# Patient Record
Sex: FEMALE | Race: ASIAN | NOT HISPANIC OR LATINO | Employment: OTHER | URBAN - METROPOLITAN AREA
[De-identification: names, ages, dates, MRNs, and addresses within clinical notes are randomized per-mention and may not be internally consistent; named-entity substitution may affect disease eponyms.]

---

## 2017-07-24 ENCOUNTER — HOSPITAL ENCOUNTER (OUTPATIENT)
Dept: PHYSICAL THERAPY | Facility: CLINIC | Age: 82
Setting detail: THERAPIES SERIES
End: 2017-07-24
Attending: INTERNAL MEDICINE
Payer: COMMERCIAL

## 2017-07-24 PROCEDURE — T1013 SIGN LANG/ORAL INTERPRETER: HCPCS | Mod: U3

## 2017-07-24 PROCEDURE — 40000719 ZZHC STATISTIC PT DEPARTMENT NEURO VISIT: Performed by: PHYSICAL THERAPIST

## 2017-07-24 PROCEDURE — 97530 THERAPEUTIC ACTIVITIES: CPT | Mod: GP | Performed by: PHYSICAL THERAPIST

## 2017-07-24 PROCEDURE — 97161 PT EVAL LOW COMPLEX 20 MIN: CPT | Mod: GP | Performed by: PHYSICAL THERAPIST

## 2017-08-01 ENCOUNTER — HOSPITAL ENCOUNTER (OUTPATIENT)
Dept: PHYSICAL THERAPY | Facility: CLINIC | Age: 82
Setting detail: THERAPIES SERIES
End: 2017-08-01
Attending: INTERNAL MEDICINE
Payer: COMMERCIAL

## 2017-08-01 PROCEDURE — T1013 SIGN LANG/ORAL INTERPRETER: HCPCS | Mod: U3

## 2017-08-01 PROCEDURE — 97110 THERAPEUTIC EXERCISES: CPT | Mod: GP | Performed by: PHYSICAL THERAPIST

## 2017-08-01 PROCEDURE — 40000719 ZZHC STATISTIC PT DEPARTMENT NEURO VISIT: Performed by: PHYSICAL THERAPIST

## 2017-08-01 PROCEDURE — 97112 NEUROMUSCULAR REEDUCATION: CPT | Mod: GP | Performed by: PHYSICAL THERAPIST

## 2017-08-01 PROCEDURE — 97530 THERAPEUTIC ACTIVITIES: CPT | Mod: GP | Performed by: PHYSICAL THERAPIST

## 2017-08-14 ENCOUNTER — HOSPITAL ENCOUNTER (OUTPATIENT)
Dept: PHYSICAL THERAPY | Facility: CLINIC | Age: 82
Setting detail: THERAPIES SERIES
End: 2017-08-14
Attending: INTERNAL MEDICINE
Payer: COMMERCIAL

## 2017-08-14 PROCEDURE — 40000719 ZZHC STATISTIC PT DEPARTMENT NEURO VISIT: Performed by: PHYSICAL THERAPIST

## 2017-08-14 PROCEDURE — 97110 THERAPEUTIC EXERCISES: CPT | Mod: GP | Performed by: PHYSICAL THERAPIST

## 2017-08-14 PROCEDURE — 97530 THERAPEUTIC ACTIVITIES: CPT | Mod: GP | Performed by: PHYSICAL THERAPIST

## 2017-08-23 ENCOUNTER — HOSPITAL ENCOUNTER (OUTPATIENT)
Dept: PHYSICAL THERAPY | Facility: CLINIC | Age: 82
Setting detail: THERAPIES SERIES
End: 2017-08-23
Attending: INTERNAL MEDICINE
Payer: COMMERCIAL

## 2017-08-23 PROCEDURE — 40000719 ZZHC STATISTIC PT DEPARTMENT NEURO VISIT: Performed by: PHYSICAL THERAPIST

## 2017-08-23 PROCEDURE — 97530 THERAPEUTIC ACTIVITIES: CPT | Mod: GP | Performed by: PHYSICAL THERAPIST

## 2017-08-23 PROCEDURE — 97110 THERAPEUTIC EXERCISES: CPT | Mod: GP | Performed by: PHYSICAL THERAPIST

## 2017-08-30 ENCOUNTER — HOSPITAL ENCOUNTER (OUTPATIENT)
Dept: PHYSICAL THERAPY | Facility: CLINIC | Age: 82
Setting detail: THERAPIES SERIES
End: 2017-08-30
Attending: INTERNAL MEDICINE
Payer: COMMERCIAL

## 2017-08-30 PROCEDURE — 97110 THERAPEUTIC EXERCISES: CPT | Mod: GP | Performed by: PHYSICAL THERAPIST

## 2017-08-30 PROCEDURE — 40000719 ZZHC STATISTIC PT DEPARTMENT NEURO VISIT: Performed by: PHYSICAL THERAPIST

## 2017-08-30 PROCEDURE — 97530 THERAPEUTIC ACTIVITIES: CPT | Mod: GP | Performed by: PHYSICAL THERAPIST

## 2017-09-06 ENCOUNTER — HOSPITAL ENCOUNTER (OUTPATIENT)
Dept: PHYSICAL THERAPY | Facility: CLINIC | Age: 82
Setting detail: THERAPIES SERIES
End: 2017-09-06
Attending: INTERNAL MEDICINE
Payer: COMMERCIAL

## 2017-09-06 PROCEDURE — 40000719 ZZHC STATISTIC PT DEPARTMENT NEURO VISIT: Performed by: PHYSICAL THERAPIST

## 2017-09-06 PROCEDURE — 97530 THERAPEUTIC ACTIVITIES: CPT | Mod: GP | Performed by: PHYSICAL THERAPIST

## 2017-09-06 PROCEDURE — 97110 THERAPEUTIC EXERCISES: CPT | Mod: GP | Performed by: PHYSICAL THERAPIST

## 2017-09-13 ENCOUNTER — HOSPITAL ENCOUNTER (OUTPATIENT)
Dept: PHYSICAL THERAPY | Facility: CLINIC | Age: 82
Setting detail: THERAPIES SERIES
End: 2017-09-13
Attending: INTERNAL MEDICINE
Payer: COMMERCIAL

## 2017-09-13 PROCEDURE — 97110 THERAPEUTIC EXERCISES: CPT | Mod: GP | Performed by: PHYSICAL THERAPIST

## 2017-09-13 PROCEDURE — 40000719 ZZHC STATISTIC PT DEPARTMENT NEURO VISIT: Performed by: PHYSICAL THERAPIST

## 2017-09-13 PROCEDURE — 97530 THERAPEUTIC ACTIVITIES: CPT | Mod: GP | Performed by: PHYSICAL THERAPIST

## 2017-09-21 ENCOUNTER — HOSPITAL ENCOUNTER (OUTPATIENT)
Dept: PHYSICAL THERAPY | Facility: CLINIC | Age: 82
Setting detail: THERAPIES SERIES
End: 2017-09-21
Attending: INTERNAL MEDICINE
Payer: COMMERCIAL

## 2017-09-21 PROCEDURE — 97530 THERAPEUTIC ACTIVITIES: CPT | Mod: GP | Performed by: PHYSICAL THERAPIST

## 2017-09-21 PROCEDURE — 40000719 ZZHC STATISTIC PT DEPARTMENT NEURO VISIT: Performed by: PHYSICAL THERAPIST

## 2017-09-21 PROCEDURE — 97110 THERAPEUTIC EXERCISES: CPT | Mod: GP | Performed by: PHYSICAL THERAPIST

## 2017-09-28 ENCOUNTER — HOSPITAL ENCOUNTER (OUTPATIENT)
Dept: PHYSICAL THERAPY | Facility: CLINIC | Age: 82
Setting detail: THERAPIES SERIES
End: 2017-09-28
Attending: INTERNAL MEDICINE
Payer: COMMERCIAL

## 2017-09-28 PROCEDURE — 97110 THERAPEUTIC EXERCISES: CPT | Mod: GP | Performed by: PHYSICAL THERAPIST

## 2017-09-28 PROCEDURE — 40000719 ZZHC STATISTIC PT DEPARTMENT NEURO VISIT: Performed by: PHYSICAL THERAPIST

## 2017-09-28 PROCEDURE — 97530 THERAPEUTIC ACTIVITIES: CPT | Mod: GP | Performed by: PHYSICAL THERAPIST

## 2017-10-13 ENCOUNTER — HOSPITAL ENCOUNTER (OUTPATIENT)
Dept: PHYSICAL THERAPY | Facility: CLINIC | Age: 82
Setting detail: THERAPIES SERIES
End: 2017-10-13
Attending: INTERNAL MEDICINE
Payer: COMMERCIAL

## 2017-10-13 PROCEDURE — 97110 THERAPEUTIC EXERCISES: CPT | Mod: GP | Performed by: PHYSICAL THERAPIST

## 2017-10-13 PROCEDURE — 40000719 ZZHC STATISTIC PT DEPARTMENT NEURO VISIT: Performed by: PHYSICAL THERAPIST

## 2017-10-13 PROCEDURE — 97530 THERAPEUTIC ACTIVITIES: CPT | Mod: GP | Performed by: PHYSICAL THERAPIST

## 2017-10-13 NOTE — IP AVS SNAPSHOT
"                  MRN:[id number]                      After Visit Summary   10/13/2017    Julio Grier    MRN: [id number]           Visit Information        Provider Department      10/13/2017  9:00 AM Shannon Toledo, PT; [employer] [facility], Physical Therapy - Outpatient        Your next 10 appointments already scheduled     Oct 18, 2017 10:00 AM CDT   Neuro Treatment with Fidelia Foster, PT   [facility], Physical Therapy - Outpatient ([facility])    [address], [address]  [place] [address]   [phone]            Oct 25, 2017  9:00 AM CDT   Neuro Treatment with Shannon Toledo, PT   [facility], Physical Therapy - Outpatient ([facility])    [address], [address]  [place] [address]   [phone]            Nov 03, 2017 10:00 AM CDT   Neuro Treatment with Shannon Toledo, PT   [facility], Physical Therapy - Outpatient ([facility])    [address], [address]  [place] [address]   [phone]                Further instructions from your care team       10/13/17    Chloe:    Congratulations on your wedding!    I think you mom needs to continue to do the seated leg exercises daily AND the standing at the walker or kitchen counter DAILY.  It really helps her to stay strong in the legs and takes weight off the buttocks.      She has 3 more PT allie'ts.     Thanks much  Shannon  PT    MyChart Information     [employer] lets you send messages to your doctor, view your test results, renew your prescriptions, schedule appointments and more. To sign up, go to [email or web address] . Click on \"Log in\" on the left side of the screen, which will take you to the Welcome page. Then click on \"Sign up Now\" on the right side of the page.     You will be asked to enter the access code listed below, as well as some personal information. Please " follow the directions to create your username and password.     Your access code is: [email or web address]  Expires: 2018  9:32 AM     Your access code will  in 90 days. If you need help or a new code, please call your [place] clinic or [phone].        Care EveryWhere ID     This is your Care EveryWhere ID. This could be used by other organizations to access your [place] medical records  [id number]        Equal Access to Services     BENJAMIN RUIZ : Hadii aad ku hadasho Somatali, waaxda luqadaha, qaybta kaalmada adeegjagdish, elisabet pelayo . So [facility] [phone].    ATENCIÓN: Si habla español, tiene a rahman disposición servicios gratuitos de asistencia lingüística. Llame al [phone].    We comply with applicable federal civil rights laws and [place] laws. We do not discriminate on the basis of race, color, national origin, age, disability, sex, sexual orientation, or gender identity.

## 2017-10-13 NOTE — DISCHARGE INSTRUCTIONS
10/13/17    Chloe:    Congratulations on your wedding!    I think you mom needs to continue to do the seated leg exercises daily AND the standing at the walker or kitchen counter DAILY.  It really helps her to stay strong in the legs and takes weight off the buttocks.      She has 3 more PT allie'ts.     Thanks much  Shannon  PT

## 2017-10-18 ENCOUNTER — HOSPITAL ENCOUNTER (OUTPATIENT)
Dept: PHYSICAL THERAPY | Facility: CLINIC | Age: 82
Setting detail: THERAPIES SERIES
End: 2017-10-18
Attending: INTERNAL MEDICINE
Payer: COMMERCIAL

## 2017-10-25 ENCOUNTER — HOSPITAL ENCOUNTER (OUTPATIENT)
Dept: PHYSICAL THERAPY | Facility: CLINIC | Age: 82
Setting detail: THERAPIES SERIES
End: 2017-10-25
Attending: INTERNAL MEDICINE
Payer: COMMERCIAL

## 2017-10-25 PROCEDURE — 97530 THERAPEUTIC ACTIVITIES: CPT | Mod: GP | Performed by: PHYSICAL THERAPIST

## 2017-10-25 PROCEDURE — 40000719 ZZHC STATISTIC PT DEPARTMENT NEURO VISIT: Performed by: PHYSICAL THERAPIST

## 2017-10-25 PROCEDURE — T1013 SIGN LANG/ORAL INTERPRETER: HCPCS | Mod: U3

## 2017-10-25 NOTE — DISCHARGE INSTRUCTIONS
10/25/17    Try to do DAILY standing at the walker.  It really helps the leg strength.      Give Young a plastic glass.  Then she can use both hands to use the glass and her left hand will get some exercise.      thanks  Shannon  PT  [phone]

## 2017-10-25 NOTE — IP AVS SNAPSHOT
"                  MRN:[id number]                      After Visit Summary   10/25/2017    Julio Grier    MRN: [id number]           Visit Information        Provider Department      10/25/2017  9:00 AM Oh Brijesh Cai Monica, PT [facility], [place], Physical Therapy - Outpatient        Your next 10 appointments already scheduled     2017 10:00 AM CDT   Neuro Treatment with Shannon Toledo, PT   [facility], [place], Physical Therapy - Outpatient ([facility])    [address], [address]  [place] [address]   [phone]                Further instructions from your care team       10/25/17    Try to do DAILY standing at the walker.  It really helps the leg strength.      Give Julio a plastic glass.  Then she can use both hands to use the glass and her left hand will get some exercise.      thanks  Shannon  PT  [phone]    MyChart Information     [employer] lets you send messages to your doctor, view your test results, renew your prescriptions, schedule appointments and more. To sign up, go to [email or web address] . Click on \"Log in\" on the left side of the screen, which will take you to the Welcome page. Then click on \"Sign up Now\" on the right side of the page.     You will be asked to enter the access code listed below, as well as some personal information. Please follow the directions to create your username and password.     Your access code is: [email or web address]  Expires: 2018  9:32 AM     Your access code will  in 90 days. If you need help or a new code, please call your [place] clinic or [phone].        Care EveryWhere ID     This is your Care EveryWhere ID. This could be used by other organizations to access your [place] medical records  [id number]        Equal Access to Services     SABINO RUIZ : Garry Clemons, boo felton, elisabet denton. So [employer] " [phone].    ATENCIÓN: Si habla español, tiene a rahman disposición servicios gratuitos de asistencia lingüística. Llame al [phone].    We comply with applicable federal civil rights laws and [place] laws. We do not discriminate on the basis of race, color, national origin, age, disability, sex, sexual orientation, or gender identity.

## 2017-11-03 ENCOUNTER — HOSPITAL ENCOUNTER (OUTPATIENT)
Dept: PHYSICAL THERAPY | Facility: CLINIC | Age: 82
Setting detail: THERAPIES SERIES
End: 2017-11-03
Attending: INTERNAL MEDICINE
Payer: COMMERCIAL

## 2017-11-03 PROCEDURE — 40000719 ZZHC STATISTIC PT DEPARTMENT NEURO VISIT: Performed by: PHYSICAL THERAPIST

## 2017-11-03 PROCEDURE — 97530 THERAPEUTIC ACTIVITIES: CPT | Mod: GP | Performed by: PHYSICAL THERAPIST

## 2017-11-06 NOTE — ADDENDUM NOTE
Encounter addended by: Shannon Toledo, PT on: 11/6/2017  8:26 AM<BR>     Actions taken: Flowsheet accepted

## 2017-11-29 ENCOUNTER — HOSPITAL ENCOUNTER (OUTPATIENT)
Dept: PHYSICAL THERAPY | Facility: CLINIC | Age: 82
Setting detail: THERAPIES SERIES
End: 2017-11-29
Attending: INTERNAL MEDICINE
Payer: COMMERCIAL

## 2017-11-29 PROCEDURE — 40000719 ZZHC STATISTIC PT DEPARTMENT NEURO VISIT: Performed by: PHYSICAL THERAPIST

## 2017-11-29 PROCEDURE — 97530 THERAPEUTIC ACTIVITIES: CPT | Mod: GP | Performed by: PHYSICAL THERAPIST

## 2017-11-29 PROCEDURE — 97110 THERAPEUTIC EXERCISES: CPT | Mod: GP | Performed by: PHYSICAL THERAPIST

## 2017-12-18 ENCOUNTER — HOSPITAL ENCOUNTER (OUTPATIENT)
Dept: PHYSICAL THERAPY | Facility: CLINIC | Age: 82
Setting detail: THERAPIES SERIES
End: 2017-12-18
Attending: INTERNAL MEDICINE
Payer: COMMERCIAL

## 2017-12-18 PROCEDURE — 97110 THERAPEUTIC EXERCISES: CPT | Mod: GP | Performed by: PHYSICAL THERAPIST

## 2017-12-18 PROCEDURE — 40000719 ZZHC STATISTIC PT DEPARTMENT NEURO VISIT: Performed by: PHYSICAL THERAPIST

## 2017-12-18 PROCEDURE — 97530 THERAPEUTIC ACTIVITIES: CPT | Mod: GP | Performed by: PHYSICAL THERAPIST

## 2017-12-18 PROCEDURE — T1013 SIGN LANG/ORAL INTERPRETER: HCPCS | Mod: U3

## 2017-12-19 NOTE — PROGRESS NOTES
12/18/17 0900   Signing Clinician's Name / Credentials   Signing clinician's name / credentials Shannon Toledo PT   Session Number   Session Number 15   Goal 1   Goal Identifier HEP   Goal Description 1. Pt able to demo HEP consisting of seated/lying trunk/LE strengthening/postural control/balance ex with supervision only to maximize her functional mobility and safety.   Target Date 12/29/17   Date Met 12/19/17   Goal 2   Goal Identifier transfers   Goal Description 2. Pt to demonstrate consistently safe stand pivot transfers w/c to bed with less than moderate assist of 1 to decrease burden on caregiver and increase her participation in ADL's.   Target Date 12/29/17   Date Met 12/19/17   Goal 3   Goal Identifier standing program established/ able, met w/ son's assist w/ use of 2wh walker   Goal Description 3. Pt to demonstrate ability to perform active standing with hand support/1 assist or in standing frame to receive benefits of weight bearing and more prolonged upright posture.   Target Date 10/22/17   Date Met 11/29/17       Present Yes   Language Hmong   Subjective Report   Subjective Report Julio is here for her last session.   here in manual w/c for mobility.  son was sick but i do my home exer.   Therapeutic Procedure/exercise   Minutes 12   Skilled Intervention reviewed her HEP   Patient Response marichuy well   Treatment Detail reviewed her seated in w/c laq's AP,s and march in place.  marichuy well   Progress above. rec she cont w/ this at home daily.    Therapeutic Activity   Minutes 32   Skilled Intervention transfers, standing   Patient Response marichuy well   Treatment Detail facing 2ww paced against mat table - pt able to stand 5x at 25 -45 sec each time, min assist and cues.  rec she cont this at home.  transfers w/ min A.  video taped this for pt to show son - that he should assist her less and let her do more.  she agrees.    Progress marichuy well.    Education   Learner Patient   Readiness  Acceptance   Method Explanation   Response Verbalizes Understanding   Education Comments cont HEP daily   Plan   Homework above   Home program seated exer as above. standing facing the walker w/ son   Updates to plan of care goals met   Plan for next session d/c   Total Session Time   Timed Code Treatment Minutes 44   Total Treatment Time (sum of timed and untimed services) 44

## 2017-12-19 NOTE — PROGRESS NOTES
Outpatient Physical Therapy Discharge Note     Patient: Julio RIOS Oh  : 1934    Beginning/End Dates of Reporting Period:  17 to 2017    Referring Provider: Julio Romero MD    Therapy Diagnosis: weakness     Client Self Report: Julio is here for her last session.   here in manual w/c for mobility.  son was sick but i do my home exer.    Goals:  Goal Identifier HEP   Goal Description 1. Pt able to demo HEP consisting of seated/lying trunk/LE strengthening/postural control/balance ex with supervision only to maximize her functional mobility and safety.   Target Date 17   Date Met  17   Progress:     Goal Identifier transfers   Goal Description 2. Pt to demonstrate consistently safe stand pivot transfers w/c to bed with less than moderate assist of 1 to decrease burden on caregiver and increase her participation in ADL's.   Target Date 17   Date Met  17   Progress:     Goal Identifier standing program established/ able, met w/ son's assist w/ use of 2wh walker   Goal Description 3. Pt to demonstrate ability to perform active standing with hand support/1 assist or in standing frame to receive benefits of weight bearing and more prolonged upright posture.   Target Date 10/22/17   Date Met  17   Progress:         Progress Toward Goals:   Progress this reporting period: good progress      Plan:  Discharge from therapy.    Discharge:  yes    Reason for Discharge: Patient has met all goals.    Equipment Issued: none    Discharge Plan: Patient to continue home program.

## 2017-12-22 NOTE — ADDENDUM NOTE
Encounter addended by: Torie Sawant on: 12/22/2017 12:56 PM<BR>     Actions taken: Visit Navigator Flowsheet section accepted, Charge Capture section accepted

## 2018-08-08 ENCOUNTER — HOSPITAL ENCOUNTER (OUTPATIENT)
Dept: PHYSICAL THERAPY | Facility: CLINIC | Age: 83
Setting detail: THERAPIES SERIES
End: 2018-08-08
Attending: INTERNAL MEDICINE
Payer: COMMERCIAL

## 2018-08-08 PROCEDURE — T1013 SIGN LANG/ORAL INTERPRETER: HCPCS | Mod: U3

## 2018-08-08 PROCEDURE — 97161 PT EVAL LOW COMPLEX 20 MIN: CPT | Mod: GP | Performed by: PHYSICAL THERAPIST

## 2018-08-08 PROCEDURE — 97110 THERAPEUTIC EXERCISES: CPT | Mod: GP | Performed by: PHYSICAL THERAPIST

## 2018-08-08 PROCEDURE — 40000719 ZZHC STATISTIC PT DEPARTMENT NEURO VISIT: Performed by: PHYSICAL THERAPIST

## 2018-08-08 PROCEDURE — 97530 THERAPEUTIC ACTIVITIES: CPT | Mod: GP | Performed by: PHYSICAL THERAPIST

## 2018-08-08 NOTE — DISCHARGE INSTRUCTIONS
8/8/18    Chloe:    Please stand with Young daily - facing walker/ brace the walker against something for stability.  Today she did 50 seconds, rest, 38 seconds, rest, 35 seconds.      It would be helpful if you came to one of the sessions.       thanks  Shannon  PT  [phone]

## 2018-08-08 NOTE — IP AVS SNAPSHOT
MRN:[id number]                      After Visit Summary   8/8/2018    Julio Grier    MRN: [id number]           Visit Information        Provider Department      8/8/2018  9:45 AM Oh Torie Cai; Shannon Toledo, PT [facility], [place], Physical Therapy - Outpatient          Further instructions from your care team       8/8/18    Chloe:    Please stand with Julio daily - facing walker/ brace the walker against something for stability.  Today she did 50 seconds, rest, 38 seconds, rest, 35 seconds.      It would be helpful if you came to one of the sessions.       thanks  Shannon  PT  [phone]    Care EveryWhere ID     This is your Care EveryWhere ID. This could be used by other organizations to access your [place] medical records  [id number]        Equal Access to Services     SABINO RUIZ : Garry Clemons, wateddyda luqadaha, qaybta kaalmada ademaximo, elisabet diaz. So [facility] [phone].    ATENCIÓN: Si habla español, tiene a rahman disposición servicios gratuitos de asistencia lingüística. Llame al [phone].    We comply with applicable federal civil rights laws and [place] laws. We do not discriminate on the basis of race, color, national origin, age, disability, sex, sexual orientation, or gender identity.

## 2018-08-09 NOTE — PROGRESS NOTES
08/08/18 0900   General Information   Start of Care Date 08/08/18   Referring Physician Julio Romero.     Orders Evaluate and Treat as Indicated   Order Date 07/12/18   Medical Diagnosis CVA w/ L hemiparesis.  2011   Special Instructions had cva 2011   Surgical/Medical history reviewed Yes   Pertinent Visual History  using sunglasses for outside.     Prior level of functional mobility Transfers   Transfers son helps her pt pt - stand pivot   ADL assisted by son for dressing/showering/ADL's   Improvement after PT Mild   Current Community Support Personal care attendant;Transportation service   Patient role/Employment history Disabled;Retired   Living environment Apartment/condo   Home/Community Accessibility Comments accessible   Current Assistive Devices Manual Wheel Chair;Power Wheel Chair   Patient/Family Goals Statement better standing and try to walk.     General Information Comments pt here alone/ w/ , son lives w/ her and is pca.  gets medical ride.   Fall Risk Screen   Fall screen completed by PT   Have you fallen 2 or more times in the past year? No   Have you fallen and had an injury in the past year? No   Vitals Signs   Heart Rate 60   SpO2 97   Blood Pressure 149/61   Vital Signs Comments did not take meds yet today .  10am.   Cognitive Status Examination   Orientation orientation to person, place and time  (knows August. not day)   Level of Consciousness alert   Follows Commands and Answers Questions 100% of the time   Personal Safety and Judgment intact   Memory impaired   Integumentary   Integumentary No deficits were identified   Posture   Posture Forward head position   Range of Motion (ROM)   ROM Comment wfls   Strength   Strength Comments R side gr 4, L arm 2-, L hip flexion 3-, knee ext 3- DF 3.    Bed Mobility   Bed Mobility Comments min assist   Transfer Skills   Transfer Comments min assist to R, mod assist to L   Locomotion   Wheel Chair Mobility Comments needs assist for manual w/c.    Gait   Gait Comments non amb at this time   Balance   Balance Comments sitting, can sit edge of mat w. sba.  standing requires min assist and 2ww   Sensory Examination   Sensory Perception Comments not assessed   Coordination   Coordination Comments poor L hand due to weakness   Muscle Tone   Muscle Tone other (describe)   Muscle Tone Comments low tone due to weakness   Planned Therapy Interventions   Planned Therapy Interventions bed mobility training;balance training;neuromuscular re-education;strengthening;transfer training   Clinical Impression   Criteria for Skilled Therapeutic Interventions Met yes, treatment indicated   PT Diagnosis L sided weakness and req assist w/ tx   Influenced by the following impairments weakness, deconditioned   Functional limitations due to impairments needs assist w/ bed mob, transfers, potential for skin breakdown   Clinical Presentation Stable/Uncomplicated   Clinical Decision Making (Complexity) Low complexity   Therapy Frequency other (see comments)   Predicted Duration of Therapy Intervention (days/wks) up to 6x in 90 days   Risk & Benefits of therapy have been explained Yes   Patient, Family & other staff in agreement with plan of care Yes   Clinical Impression Comments pt familiar to this clinic/ no major change in status.  has potential to improve if son/ pca did HEP regularly.  pt states son is tired from caregiving   Education Assessment   Preferred Learning Style Demonstration   Barriers to Learning Physical;Language   Goal 1   Goal Identifier transfers   Goal Description pt to perform w/c to bed/mat transfers w/ sba to min assist (vs mod)   Target Date 11/05/18   Goal 2   Goal Identifier bed mobility   Goal Description pt to perf bed mobility w/ sba only   Target Date 11/05/18   Goal 3   Goal Identifier HEP    Goal Description pt to perf indep seated and supine HEP for overall conditioning and wellness.   Target Date 11/05/18   Total Evaluation Time   Total Evaluation  Time (Minutes) 23

## 2018-08-29 ENCOUNTER — HOSPITAL ENCOUNTER (OUTPATIENT)
Dept: PHYSICAL THERAPY | Facility: CLINIC | Age: 83
Setting detail: THERAPIES SERIES
End: 2018-08-29
Attending: INTERNAL MEDICINE
Payer: COMMERCIAL

## 2018-08-29 PROCEDURE — 97530 THERAPEUTIC ACTIVITIES: CPT | Mod: GP | Performed by: PHYSICAL THERAPIST

## 2018-08-29 PROCEDURE — 97110 THERAPEUTIC EXERCISES: CPT | Mod: GP | Performed by: PHYSICAL THERAPIST

## 2018-08-29 PROCEDURE — 40000719 ZZHC STATISTIC PT DEPARTMENT NEURO VISIT: Performed by: PHYSICAL THERAPIST

## 2018-08-29 PROCEDURE — T1013 SIGN LANG/ORAL INTERPRETER: HCPCS | Mod: U3

## 2018-09-07 ENCOUNTER — HOSPITAL ENCOUNTER (OUTPATIENT)
Dept: PHYSICAL THERAPY | Facility: CLINIC | Age: 83
Setting detail: THERAPIES SERIES
End: 2018-09-07
Attending: INTERNAL MEDICINE
Payer: COMMERCIAL

## 2018-09-07 PROCEDURE — T1013 SIGN LANG/ORAL INTERPRETER: HCPCS | Mod: U3

## 2018-09-07 PROCEDURE — 97110 THERAPEUTIC EXERCISES: CPT | Mod: GP | Performed by: PHYSICAL THERAPIST

## 2018-09-07 PROCEDURE — 40000719 ZZHC STATISTIC PT DEPARTMENT NEURO VISIT: Performed by: PHYSICAL THERAPIST

## 2018-09-07 PROCEDURE — 97530 THERAPEUTIC ACTIVITIES: CPT | Mod: GP | Performed by: PHYSICAL THERAPIST

## 2018-09-14 ENCOUNTER — HOSPITAL ENCOUNTER (OUTPATIENT)
Dept: PHYSICAL THERAPY | Facility: CLINIC | Age: 83
Setting detail: THERAPIES SERIES
End: 2018-09-14
Attending: INTERNAL MEDICINE
Payer: COMMERCIAL

## 2018-09-14 PROCEDURE — T1013 SIGN LANG/ORAL INTERPRETER: HCPCS | Mod: U3

## 2018-09-14 PROCEDURE — 40000719 ZZHC STATISTIC PT DEPARTMENT NEURO VISIT: Performed by: PHYSICAL THERAPIST

## 2018-09-14 PROCEDURE — 97530 THERAPEUTIC ACTIVITIES: CPT | Mod: GP | Performed by: PHYSICAL THERAPIST

## 2018-09-14 NOTE — IP AVS SNAPSHOT
MRN:[id number]                      After Visit Summary   9/14/2018    Julio Grier    MRN: [id number]           Visit Information        Provider Department      9/14/2018  9:45 AM Torie Sawant; Shannon Toledo, PT [facility], [place], Physical Therapy - Outpatient        Your next 10 appointments already scheduled     Oct 05, 2018  1:00 PM CDT   Neuro Treatment with Shannon Toledo, PT   [facility], [place], Physical Therapy - Outpatient ([facility])    [address]  [place] [address]   [phone]            Oct 12, 2018  1:00 PM CDT   Neuro Treatment with Shannon Toledo PT   [facility], [place], Physical Therapy - Outpatient ([facility])    [address]  [place] [address]   [phone]                Further instructions from your care team       9/14/18    See pic adrian Huff.  Try to find a place in the apartment where you can stand at a rail OR best is kitchen sink.  I barely did anything to help.  30-60 seconds each time.  2-5 times.    thanks  Two more sessions.  Shannon  PT    Care EveryWhere ID     This is your Care EveryWhere ID. This could be used by other organizations to access your [place] medical records  [id number]        Equal Access to Services     SABINO RUIZ AH: Garry dicko Soalysia, waaxda luqadaha, qaybta kaalmada adeegyada, elisabet diaz. So [facility] [phone].    ATENCIÓN: Si habla español, tiene a rahman disposición servicios gratuitos de asistencia lingüística. Llame al [phone].    We comply with applicable federal civil rights laws and [place] laws. We do not discriminate on the basis of race, color, national origin, age, disability, sex, sexual orientation, or gender identity.

## 2018-09-14 NOTE — DISCHARGE INSTRUCTIONS
9/14/18    See pic adrian Huff.  Try to find a place in the apartment where you can stand at a rail OR best is kitchen sink.  I barely did anything to help.  30-60 seconds each time.  2-5 times.    thanks  Two more sessions.  Shannon BEGUM

## 2018-09-21 NOTE — ADDENDUM NOTE
Encounter addended by: Torie Sawant on: 9/21/2018 11:28 AM<BR>     Actions taken: Visit Navigator Flowsheet section accepted, Charge Capture section accepted

## 2018-10-05 ENCOUNTER — HOSPITAL ENCOUNTER (OUTPATIENT)
Dept: PHYSICAL THERAPY | Facility: CLINIC | Age: 83
Setting detail: THERAPIES SERIES
End: 2018-10-05
Attending: INTERNAL MEDICINE
Payer: COMMERCIAL

## 2018-10-05 PROCEDURE — 97530 THERAPEUTIC ACTIVITIES: CPT | Mod: GP | Performed by: PHYSICAL THERAPIST

## 2018-10-05 PROCEDURE — T1013 SIGN LANG/ORAL INTERPRETER: HCPCS | Mod: U3

## 2018-10-05 PROCEDURE — 40000719 ZZHC STATISTIC PT DEPARTMENT NEURO VISIT: Performed by: PHYSICAL THERAPIST

## 2018-10-05 NOTE — IP AVS SNAPSHOT
MRN:[id number]                      After Visit Summary   10/5/2018    Julio Grier    MRN: [id number]           Visit Information        Provider Department      10/5/2018  1:00 PM Oh Torie Cai; Shannon Toledo, PT [facility], [place], Physical Therapy - Outpatient        Your next 10 appointments already scheduled     Oct 12, 2018  1:00 PM CDT   Neuro Treatment with Shannon Toledo PT   [facility], [place], Physical Therapy - Outpatient ([facility])    [address]  [place] [address]   [phone]                Further instructions from your care team       JULIO and Son.    10/5/18    JULIO would benefit from a horizontal or vertical grab bar placed so she can stand independently.      You would need to buy one and have it installed and have permission from the landlord.  She did very well with it.    Continue all home exercises.      Shannon  PT      Care EveryWhere ID     This is your Care EveryWhere ID. This could be used by other organizations to access your [place] medical records  [id number]        Equal Access to Services     SABINO RUIZ AH: Hadii mikki dicko Soalysia, waaxda luqadaha, qaybta kaalmada adeegyacarlos, elisabet diaz. So [facility] [phone].    ATENCIÓN: Si habla español, tiene a rahman disposición servicios gratuitos de asistencia lingüística. Llame al [phone].    We comply with applicable federal civil rights laws and [place] laws. We do not discriminate on the basis of race, color, national origin, age, disability, sex, sexual orientation, or gender identity.

## 2018-10-09 NOTE — ADDENDUM NOTE
Encounter addended by: Torie Sawant on: 10/9/2018 12:21 PM<BR>     Actions taken: Visit Navigator Flowsheet section accepted, Charge Capture section accepted

## 2018-10-12 ENCOUNTER — HOSPITAL ENCOUNTER (OUTPATIENT)
Dept: PHYSICAL THERAPY | Facility: CLINIC | Age: 83
Setting detail: THERAPIES SERIES
End: 2018-10-12
Attending: INTERNAL MEDICINE
Payer: COMMERCIAL

## 2018-10-12 PROCEDURE — 97530 THERAPEUTIC ACTIVITIES: CPT | Mod: GP | Performed by: PHYSICAL THERAPIST

## 2018-10-12 PROCEDURE — 40000719 ZZHC STATISTIC PT DEPARTMENT NEURO VISIT: Performed by: PHYSICAL THERAPIST

## 2018-10-12 NOTE — DISCHARGE INSTRUCTIONS
10/12/18    Try to not help too much - let Young do as much as she can with the transfer and lying down and getting up to sitting position.     Two ways to stand:   1.  See photo from [facility].  Stand from bed.  Son in front.  30 seconds to one minute.   2.  Facing walker.    Do this 5 times per week. 1 or 2 days off.    Red band tied to chair - for arm exercise or to put on left foot and kick leg straight.     Shannon  PT  [phone]

## 2018-10-12 NOTE — IP AVS SNAPSHOT
MRN:[id number]                      After Visit Summary   10/12/2018    Julio Grier    MRN: [id number]           Visit Information        Provider Department      10/12/2018  1:00 PM Oh Torie Cai; Shannon Toledo, PT [facility], [place], Physical Therapy - Outpatient        Your next 10 appointments already scheduled     Oct 31, 2018  9:00 AM CDT   Neuro Treatment with Shannon Toledo, PT   [facility], [place], Physical Therapy - Outpatient ([facility])    [address]  [place] [address]   [phone]            Nov 09, 2018 10:45 AM CST   Neuro Treatment with Shannon Toledo PT   [facility], [place], Physical Therapy - Outpatient ([facility])    [address]  [place] [address]   [phone]                Further instructions from your care team       10/12/18    Try to not help too much - let Julio do as much as she can with the transfer and lying down and getting up to sitting position.     Two ways to stand:   1.  See photo from [facility].  Stand from bed.  Son in front.  30 seconds to one minute.   2.  Facing walker.    Do this 5 times per week. 1 or 2 days off.    Red band tied to chair - for arm exercise or to put on left foot and kick leg straight.     Shannon BEGUM  [phone]    Care EveryWhere ID     This is your Care EveryWhere ID. This could be used by other organizations to access your [place] medical records  [id number]        Equal Access to Services     SABINO RUIZ AH: Hadliane dicko Soalysia, waaxda luqadaha, qaybta kaalmada adeegyacarlos, elisabet diaz. So [facility] [phone].    ATENCIÓN: Si habla español, tiene a rahman disposición servicios gratuitos de asistencia lingüística. Llame al [phone].    We comply with applicable federal civil rights laws and [place] laws. We do not discriminate on the basis of race, color, national  origin, age, disability, sex, sexual orientation, or gender identity.

## 2018-10-31 ENCOUNTER — HOSPITAL ENCOUNTER (OUTPATIENT)
Dept: PHYSICAL THERAPY | Facility: CLINIC | Age: 83
Setting detail: THERAPIES SERIES
End: 2018-10-31
Attending: INTERNAL MEDICINE
Payer: COMMERCIAL

## 2018-10-31 PROCEDURE — 40000719 ZZHC STATISTIC PT DEPARTMENT NEURO VISIT: Performed by: PHYSICAL THERAPIST

## 2018-10-31 PROCEDURE — 97530 THERAPEUTIC ACTIVITIES: CPT | Mod: GP | Performed by: PHYSICAL THERAPIST

## 2018-10-31 PROCEDURE — T1013 SIGN LANG/ORAL INTERPRETER: HCPCS | Mod: U3

## 2018-10-31 NOTE — IP AVS SNAPSHOT
MRN:[id number]                      After Visit Summary   10/31/2018    Julio Grier    MRN: [id number]           Visit Information        Provider Department      10/31/2018  9:00 AM Oh Torie Cai; Shannon Toledo, PT [facility], [place], Physical Therapy - Outpatient        Your next 10 appointments already scheduled     Nov 09, 2018 10:45 AM CST   Neuro Treatment with Shannon Toledo, PT   [facility], [place], Physical Therapy - Outpatient ([facility])    [address], [address]  [place] [address]   [phone]                Further instructions from your care team       10/31/18    Today in PT - we worked on transfer from wheelchair to mat/bed using a walker -  Recommend you do this at home with her.       Place wheelchair at 90 degree angle to bed.  Use walker to stand and pivot to her right to get to bed.    Any questions - call me.  Thanks  Please keep STANDING with her - with walker facing something solid.    Shannon  PT  [phone]    One more allie't left.     Care EveryWhere ID     This is your Care EveryWhere ID. This could be used by other organizations to access your [place] medical records  [id number]        Equal Access to Services     SABINO RUIZ AH: Hadii mikki Clemons, boo felton, qafarhatta kaalmada yesenia, elisabet diaz. So [facility] [phone].    ATENCIÓN: Si habla español, tiene a rahman disposición servicios gratkellyos de asistencia lingüística. Llame al [phone].    We comply with applicable federal civil rights laws and [place] laws. We do not discriminate on the basis of race, color, national origin, age, disability, sex, sexual orientation, or gender identity.

## 2018-11-06 NOTE — ADDENDUM NOTE
Encounter addended by: Shannon Toledo, PT on: 11/6/2018  9:40 AM<BR>     Actions taken: Flowsheet data copied forward, Flowsheet accepted

## 2019-06-18 ENCOUNTER — HOSPITAL ENCOUNTER (OUTPATIENT)
Dept: PHYSICAL THERAPY | Facility: CLINIC | Age: 84
Setting detail: THERAPIES SERIES
End: 2019-06-18
Attending: INTERNAL MEDICINE
Payer: COMMERCIAL

## 2019-06-18 PROCEDURE — T1013 SIGN LANG/ORAL INTERPRETER: HCPCS | Mod: U3

## 2019-06-18 PROCEDURE — 97110 THERAPEUTIC EXERCISES: CPT | Mod: GP | Performed by: PHYSICAL THERAPIST

## 2019-06-18 PROCEDURE — 97161 PT EVAL LOW COMPLEX 20 MIN: CPT | Mod: GP | Performed by: PHYSICAL THERAPIST

## 2019-06-21 NOTE — ADDENDUM NOTE
Encounter addended by: Torie Sawant on: 6/21/2019 1:15 PM   Actions taken: Visit Navigator Flowsheet section accepted, Charge Capture section accepted

## 2019-06-21 NOTE — PROGRESS NOTES
06/18/19 1000   General Information   Start of Care Date 06/18/19   Referring Physician Julio Romero MD   Orders Evaluate and Treat as Indicated   Order Date 04/29/19   Medical Diagnosis CVA   Special Instructions cva 2011   Surgical/Medical history reviewed Yes   Pertinent history of current problem (include personal factors and/or comorbidities that impact the POC) son helps me.  we do exer.  i do them 2 days a week.  my son is busy.    wife still there.   no changes.  still on meds for bp.   both i and doc said to come to PT.     Pertinent Visual History  ok, no glasses   Transfers min assist   Prior level of function comment manual w/c   Improvement after PT Mild   Current Community Support Family/friend caregiver   Patient role/Employment history Disabled;Retired   Living environment Apartment/condo   Home/Community Accessibility Comments accessible   Current Assistive Devices Manual Wheel Chair;Power Wheel Chair   Patient/Family Goals Statement want to walk.  stand w/ walker.     General Information Comments pt has been seen for past episodes   Fall Risk Screen   Fall screen completed by PT   Have you fallen 2 or more times in the past year? No   Have you fallen and had an injury in the past year? No   Pain   Patient currently in pain No   Vitals Signs   Heart Rate 61   Blood Pressure 140/73   Cognitive Status Examination   Orientation person;place   Level of Consciousness alert   Follows Commands and Answers Questions 100% of the time   Personal Safety and Judgment intact   Cognitive Comment oriented to month only.  knows birthdate.     Integumentary   Integumentary No deficits were identified   Posture   Posture Forward head position   Range of Motion (ROM)   ROM Comment L ankle to neutral.  limited L wrist   Strength   Strength Comments gr 4 to 4+ right side, gr 2 grossly L side   Bed Mobility   Bed Mobility Comments min assist   Transfer Skills   Transfer Comments cga to min assist to R, mod assist to L    Locomotion   Wheel Chair Mobility Comments needs assist for manual w/c.   Gait   Gait Comments not assessed   Balance   Balance Comments sitting today on mat, no lob; later leaned L   Sensory Examination   Sensory Perception no deficits were identified   Coordination   Coordination Comments L side poor   Muscle Tone   Muscle Tone other (describe)   Muscle Tone Comments low tone due to weakness   Planned Therapy Interventions   Planned Therapy Interventions bed mobility training;balance training;neuromuscular re-education;strengthening;transfer training   Clinical Impression   Criteria for Skilled Therapeutic Interventions Met yes, treatment indicated   PT Diagnosis L sided weakness   Influenced by the following impairments weakness, fatigue deconditioning   Functional limitations due to impairments needs assist for all mobility/ son assists, w/ caregiver burden   Clinical Presentation Stable/Uncomplicated   Clinical Decision Making (Complexity) Low complexity   Therapy Frequency other (see comments)   Predicted Duration of Therapy Intervention (days/wks) up to 4x in 90 days   Risk & Benefits of therapy have been explained Yes   Patient, Family & other staff in agreement with plan of care Yes   Clinical Impression Comments pt known to me.  no major changes.   needs to do more w/ HEP   Education Assessment   Preferred Learning Style Demonstration   Barriers to Learning Language;Physical   Goal 1   Goal Description pt to perf tx/s bed/mat to w/c w/ sba only to decrease caregiver burden   Target Date 09/14/19   Goal Identifier transfers w/ sba   Goal 2   Goal Description bed mob w/ sba   Target Date 09/14/19   Goal Identifier bed mob   Goal 3   Goal Description pt to be indep w/ standing at walker facing stable surface x 30 sec for overall mobility and decrease caregiver burden   Target Date 09/14/19   Goal Identifier HEP   Total Evaluation Time   PT Eval, Low Complexity Minutes (58822) 17

## 2019-07-15 ENCOUNTER — HOSPITAL ENCOUNTER (OUTPATIENT)
Dept: PHYSICAL THERAPY | Facility: CLINIC | Age: 84
Setting detail: THERAPIES SERIES
End: 2019-07-15
Attending: INTERNAL MEDICINE
Payer: COMMERCIAL

## 2019-07-15 PROCEDURE — T1013 SIGN LANG/ORAL INTERPRETER: HCPCS | Mod: U3

## 2019-07-15 PROCEDURE — 97530 THERAPEUTIC ACTIVITIES: CPT | Mod: GP | Performed by: PHYSICAL THERAPIST

## 2019-07-22 NOTE — ADDENDUM NOTE
Encounter addended by: Torie Sawant on: 7/22/2019 10:23 AM   Actions taken: Flowsheet data copied forward, Visit Navigator Flowsheet section accepted, Charge Capture section accepted

## 2019-07-31 ENCOUNTER — HOSPITAL ENCOUNTER (OUTPATIENT)
Dept: PHYSICAL THERAPY | Facility: CLINIC | Age: 84
Setting detail: THERAPIES SERIES
End: 2019-07-31
Attending: INTERNAL MEDICINE
Payer: COMMERCIAL

## 2019-07-31 PROCEDURE — 97530 THERAPEUTIC ACTIVITIES: CPT | Mod: GP | Performed by: PHYSICAL THERAPIST

## 2019-07-31 PROCEDURE — T1013 SIGN LANG/ORAL INTERPRETER: HCPCS | Mod: U3

## 2019-08-01 NOTE — PROGRESS NOTES
07/31/19 1000   Signing Clinician's Name / Credentials   Signing clinician's name / credentials Shannon Paris PT   Session Number   Session Number 3  HP   Goal 1   Goal Description pt to perf tx/s bed/mat to w/c w/ sba only to decrease caregiver burden   Target Date 09/14/19   Goal Identifier transfers w/ sba (variable min assist mostly)   Goal 2   Goal Description bed mob w/ sba   Target Date 09/14/19   Goal Identifier bed mob (better w/ cues. cues to min assist needed)   Goal 3   Goal Description pt to be indep w/ standing at walker facing stable surface x 30 sec for overall mobility and decrease caregiver burden   Target Date 09/14/19   Goal Identifier HEP   Date Met 07/31/19       Present Yes   Language Other   Subjective Report   Subjective Report Daria Grier here .  pt reports:  why can't i keep coming.  are you tired of me?     Therapeutic Activity   Therapeutic Activities: dynamic activities to improve functional performance minutes (82644) 42   Skilled Intervention transfers, stand and bed mob   Patient Response marichuy well   Treatment Detail tx to mat to her R, min assist, bed mob min assist 10%.  pt able to do bridges and heel slides w/ cues/min assist.  sit/stand w /cga and once up w/ walker, cga to min assist.  tx to the Left, min asist.   with walker 2ww, and min assist/cues - pt was able to walk 4' w/ walker. then in //bars min assist and w/c follow - she walked 6ft x 2, difficult for her to get knees/ hips fully extended.  rec cont daily HEP   Progress above; educ pt re; need for d/c and cont w/ HEP   Education   Learner Patient   Readiness Acceptance   Method Explanation;Demonstration   Response Verbalizes Understanding;Demonstrates Understanding   Education Comments need for cont'd HEP   Plan   Homework above   Home program bridges, LTR, SKC, seated laq's, ap's and self rom ue's.  standing w/ son facing 2ww w/ assist as needed.    Plan for next session goals as  above, final session today, d/c   Total Session Time   Timed Code Treatment Minutes 42   Total Treatment Time (sum of timed and untimed services) 42

## 2022-06-03 ENCOUNTER — APPOINTMENT (OUTPATIENT)
Dept: CT IMAGING | Facility: CLINIC | Age: 87
End: 2022-06-03
Attending: EMERGENCY MEDICINE
Payer: COMMERCIAL

## 2022-06-03 ENCOUNTER — HOSPITAL ENCOUNTER (EMERGENCY)
Facility: CLINIC | Age: 87
Discharge: HOME | End: 2022-06-03
Attending: EMERGENCY MEDICINE | Admitting: EMERGENCY MEDICINE
Payer: COMMERCIAL

## 2022-06-03 VITALS
DIASTOLIC BLOOD PRESSURE: 67 MMHG | HEIGHT: 62 IN | HEART RATE: 68 BPM | TEMPERATURE: 98.5 F | BODY MASS INDEX: 16.56 KG/M2 | RESPIRATION RATE: 16 BRPM | SYSTOLIC BLOOD PRESSURE: 143 MMHG | WEIGHT: 90 LBS | OXYGEN SATURATION: 97 %

## 2022-06-03 DIAGNOSIS — R31.9 HEMATURIA, UNSPECIFIED TYPE: ICD-10-CM

## 2022-06-03 LAB
ALBUMIN UR-MCNC: 30 MG/DL
ANION GAP SERPL CALCULATED.3IONS-SCNC: 6 MMOL/L (ref 3–14)
APPEARANCE UR: ABNORMAL
BACTERIA #/AREA URNS HPF: ABNORMAL /HPF
BASOPHILS # BLD AUTO: 0 10E3/UL (ref 0–0.2)
BASOPHILS NFR BLD AUTO: 0 %
BILIRUB UR QL STRIP: NEGATIVE
BUN SERPL-MCNC: 17 MG/DL (ref 7–30)
CALCIUM SERPL-MCNC: 9.8 MG/DL (ref 8.5–10.1)
CHLORIDE BLD-SCNC: 106 MMOL/L (ref 94–109)
CO2 SERPL-SCNC: 26 MMOL/L (ref 20–32)
COLOR UR AUTO: ABNORMAL
CREAT SERPL-MCNC: 0.77 MG/DL (ref 0.52–1.04)
EOSINOPHIL # BLD AUTO: 0.5 10E3/UL (ref 0–0.7)
EOSINOPHIL NFR BLD AUTO: 6 %
ERYTHROCYTE [DISTWIDTH] IN BLOOD BY AUTOMATED COUNT: 14.6 % (ref 10–15)
GFR SERPL CREATININE-BSD FRML MDRD: 74 ML/MIN/1.73M2
GLUCOSE BLD-MCNC: 250 MG/DL (ref 70–99)
GLUCOSE BLDC GLUCOMTR-MCNC: 104 MG/DL (ref 70–99)
GLUCOSE UR STRIP-MCNC: NEGATIVE MG/DL
HCT VFR BLD AUTO: 34.5 % (ref 35–47)
HGB BLD-MCNC: 11.6 G/DL (ref 11.7–15.7)
HGB UR QL STRIP: ABNORMAL
IMM GRANULOCYTES # BLD: 0 10E3/UL
IMM GRANULOCYTES NFR BLD: 0 %
KETONES UR STRIP-MCNC: NEGATIVE MG/DL
LEUKOCYTE ESTERASE UR QL STRIP: ABNORMAL
LYMPHOCYTES # BLD AUTO: 2 10E3/UL (ref 0.8–5.3)
LYMPHOCYTES NFR BLD AUTO: 22 %
MCH RBC QN AUTO: 32.1 PG (ref 26.5–33)
MCHC RBC AUTO-ENTMCNC: 33.6 G/DL (ref 31.5–36.5)
MCV RBC AUTO: 96 FL (ref 78–100)
MONOCYTES # BLD AUTO: 0.6 10E3/UL (ref 0–1.3)
MONOCYTES NFR BLD AUTO: 7 %
MUCOUS THREADS #/AREA URNS LPF: PRESENT /LPF
NEUTROPHILS # BLD AUTO: 6 10E3/UL (ref 1.6–8.3)
NEUTROPHILS NFR BLD AUTO: 65 %
NITRATE UR QL: POSITIVE
NRBC # BLD AUTO: 0 10E3/UL
NRBC BLD AUTO-RTO: 0 /100
PH UR STRIP: 8 [PH] (ref 5–7)
PLATELET # BLD AUTO: 221 10E3/UL (ref 150–450)
POTASSIUM BLD-SCNC: 4.6 MMOL/L (ref 3.4–5.3)
RBC # BLD AUTO: 3.61 10E6/UL (ref 3.8–5.2)
RBC URINE: >182 /HPF
SODIUM SERPL-SCNC: 138 MMOL/L (ref 133–144)
SP GR UR STRIP: 1.01 (ref 1–1.03)
TRANSITIONAL EPI: 1 /HPF
UROBILINOGEN UR STRIP-MCNC: NORMAL MG/DL
WBC # BLD AUTO: 9.2 10E3/UL (ref 4–11)
WBC CLUMPS #/AREA URNS HPF: PRESENT /HPF
WBC URINE: >182 /HPF

## 2022-06-03 PROCEDURE — 81001 URINALYSIS AUTO W/SCOPE: CPT | Performed by: EMERGENCY MEDICINE

## 2022-06-03 PROCEDURE — 82310 ASSAY OF CALCIUM: CPT | Performed by: EMERGENCY MEDICINE

## 2022-06-03 PROCEDURE — 99284 EMERGENCY DEPT VISIT MOD MDM: CPT | Performed by: EMERGENCY MEDICINE

## 2022-06-03 PROCEDURE — 250N000013 HC RX MED GY IP 250 OP 250 PS 637: Performed by: EMERGENCY MEDICINE

## 2022-06-03 PROCEDURE — 74176 CT ABD & PELVIS W/O CONTRAST: CPT

## 2022-06-03 PROCEDURE — 85004 AUTOMATED DIFF WBC COUNT: CPT | Performed by: EMERGENCY MEDICINE

## 2022-06-03 PROCEDURE — 36415 COLL VENOUS BLD VENIPUNCTURE: CPT | Performed by: EMERGENCY MEDICINE

## 2022-06-03 PROCEDURE — 99284 EMERGENCY DEPT VISIT MOD MDM: CPT | Mod: 25 | Performed by: EMERGENCY MEDICINE

## 2022-06-03 PROCEDURE — 87086 URINE CULTURE/COLONY COUNT: CPT | Performed by: EMERGENCY MEDICINE

## 2022-06-03 RX ORDER — HYDROXYZINE HYDROCHLORIDE 10 MG/1
TABLET, FILM COATED ORAL
Status: ON HOLD | COMMUNITY
Start: 2020-08-06 | End: 2023-03-22

## 2022-06-03 RX ORDER — CIPROFLOXACIN 500 MG/1
500 TABLET, FILM COATED ORAL 2 TIMES DAILY
Qty: 10 TABLET | Refills: 0 | Status: ON HOLD | OUTPATIENT
Start: 2022-06-03 | End: 2023-03-22

## 2022-06-03 RX ORDER — INSULIN GLARGINE 100 [IU]/ML
INJECTION, SOLUTION SUBCUTANEOUS
COMMUNITY
Start: 2021-08-03

## 2022-06-03 RX ORDER — ATORVASTATIN CALCIUM 20 MG/1
1 TABLET, FILM COATED ORAL EVERY EVENING
COMMUNITY
Start: 2021-11-30

## 2022-06-03 RX ORDER — NITROFURANTOIN 25; 75 MG/1; MG/1
100 CAPSULE ORAL 2 TIMES DAILY
Qty: 10 CAPSULE | Refills: 0 | Status: SHIPPED | OUTPATIENT
Start: 2022-06-03 | End: 2022-06-03

## 2022-06-03 RX ORDER — CIPROFLOXACIN 500 MG/1
500 TABLET, FILM COATED ORAL ONCE
Status: COMPLETED | OUTPATIENT
Start: 2022-06-03 | End: 2022-06-03

## 2022-06-03 RX ORDER — BETAMETHASONE VALERATE 1.2 MG/G
CREAM TOPICAL
Status: ON HOLD | COMMUNITY
Start: 2021-10-04 | End: 2023-03-22

## 2022-06-03 RX ORDER — NITROFURANTOIN 25; 75 MG/1; MG/1
100 CAPSULE ORAL EVERY 12 HOURS SCHEDULED
Status: DISCONTINUED | OUTPATIENT
Start: 2022-06-03 | End: 2022-06-03 | Stop reason: HOSPADM

## 2022-06-03 RX ORDER — AMLODIPINE BESYLATE 2.5 MG/1
1 TABLET ORAL DAILY
COMMUNITY
Start: 2021-09-24

## 2022-06-03 RX ORDER — CLOPIDOGREL BISULFATE 75 MG/1
75 TABLET ORAL DAILY
COMMUNITY
Start: 2022-04-23

## 2022-06-03 RX ORDER — FLUOCINONIDE 0.5 MG/G
OINTMENT TOPICAL
Status: ON HOLD | COMMUNITY
Start: 2020-06-24 | End: 2023-03-22

## 2022-06-03 RX ORDER — MIRTAZAPINE 7.5 MG/1
TABLET, FILM COATED ORAL
COMMUNITY
Start: 2022-05-01

## 2022-06-03 RX ADMIN — CIPROFLOXACIN 500 MG: 500 TABLET, FILM COATED ORAL at 21:03

## 2022-06-03 ASSESSMENT — ENCOUNTER SYMPTOMS
MUSCULOSKELETAL NEGATIVE: 1
RESPIRATORY NEGATIVE: 1
FEVER: 0
HEMATURIA: 1
EYES NEGATIVE: 1
ABDOMINAL PAIN: 1
FATIGUE: 1
VOMITING: 1
CONSTIPATION: 1
UNEXPECTED WEIGHT CHANGE: 0
CARDIOVASCULAR NEGATIVE: 1
NAUSEA: 0
BLOOD IN STOOL: 0

## 2022-06-03 NOTE — ED PROVIDER NOTES
ED Provider Note  [facility]      History     Chief Complaint   Patient presents with     Abdominal Pain     Started around 0500 this am and hematuria x3 episodes     HPI  Julio Grier is a 87 year old female with a complex medical history including stroke in 2011, T2DM, chronic constipation, and urinary incontinence with multiple UTIs who presents with hematuria since 05:00 am this morning. The son is the primary caregiver who noted spots of blood in her diaper in the morning after she reported suprapubic pain. Her abdominal pain persisted though improved mildly after arriving in the ED. She has no smoking history, though her son is a current smoker. She denies any history of weight loss or sleep disturbance. Urinary frequency cannot be determined due to her incontinence.     Past Medical History  Past Medical History:   Diagnosis Date     Diabetes mellitus (H)      Unspecified cerebral artery occlusion with cerebral infarction      Past Surgical History:   Procedure Laterality Date     BACK SURGERY       ORTHOPEDIC SURGERY       amLODIPine (NORVASC) 2.5 MG tablet  atorvastatin (LIPITOR) 20 MG tablet  calcium carbonate 600 mg-vitamin D 400 units (CALCIUM CARB-CHOLECALCIFEROL) 600-400 MG-UNIT per tablet  ciprofloxacin (CIPRO) 500 MG tablet  ciprofloxacin (CIPRO) 500 MG tablet  fish oil-omega-3 fatty acids 1000 MG capsule  fluocinonide (LIDEX) 0.05 % external ointment  glipiZIDE (GLUCOTROL) 5 MG tablet  hydrOXYzine (ATARAX) 10 MG tablet  metformin (GLUCOPHAGE) 1000 MG tablet  oxybutynin (DITROPAN-XL) 10 MG 24 hr tablet  simvastatin (ZOCOR) 20 MG tablet  betamethasone valerate (VALISONE) 0.1 % external cream  clopidogrel (PLAVIX) 75 MG tablet  hypromellose-dextran (ARTIFICAL TEARS) 0.1-0.3 % ophthalmic solution  LANTUS SOLOSTAR 100 UNIT/ML soln  mirtazapine (REMERON) 7.5 MG tablet      Allergies   Allergen Reactions     Aspirin Unknown     Irbesartan Unknown     Penicillins Other (See  "Comments)     Pt has some sort of reaction at dentist office after PEN supposedly. Pt cannot verify nor can family. Pt has no reaction to \"stephane\" products as was previously indicated. This was verified by familly. All this reviewed with opthalmalogist and anestheiologist on 5/17/2018     Family History  History reviewed. No pertinent family history.  Social History   Social History     Tobacco Use     Smoking status: Never Smoker     Smokeless tobacco: Never Used   Substance Use Topics     Alcohol use: Not Currently     Drug use: Never     Review of Systems   Constitutional: Positive for fatigue. Negative for fever and unexpected weight change.   HENT: Negative.    Eyes: Negative.    Respiratory: Negative.    Cardiovascular: Negative.    Gastrointestinal: Positive for abdominal pain, constipation and vomiting. Negative for blood in stool and nausea.   Genitourinary: Positive for hematuria and pelvic pain.        One episode of hematuria. Persistent urinary incontinence requiring diapers.   Musculoskeletal: Negative.    Skin: Negative.      A complete review of systems was performed with pertinent positives and negatives noted in the HPI, and all other systems negative.    Physical Exam   BP: 138/63  Pulse: 67  Temp: 98.5  F (36.9  C)  Resp: 16  Height: 157.5 cm (5' 2\")  Weight: 40.8 kg (90 lb)  SpO2: 99 %  Physical Exam  Constitutional:       Appearance: She is normal weight.   Cardiovascular:      Rate and Rhythm: Normal rate and regular rhythm.      Heart sounds: Normal heart sounds.   Pulmonary:      Effort: Pulmonary effort is normal.      Breath sounds: Normal breath sounds.   Abdominal:      General: Abdomen is flat.      Palpations: Abdomen is soft.      Tenderness: There is abdominal tenderness in the suprapubic area.   Genitourinary:     Vagina: Normal.      Comments: Small amount of urine with no evidence of blood in diaper.  Skin:     General: Skin is warm.      Capillary Refill: Capillary refill takes " less than 2 seconds.   Neurological:      Mental Status: She is alert.       ED Course     ED Course as of 06/06/22 0822 Fri Jun 03, 2022 2050 GFR Estimate: 74     Procedures            Results for orders placed or performed during the hospital encounter of 06/03/22   CT Abdomen Pelvis w/o Contrast     Status: None    Narrative    EXAM: CT ABDOMEN PELVIS W/O CONTRAST  LOCATION: [facility]  DATE/TIME: 6/3/2022 4:55 PM    INDICATION: Hematuria, unknown cause  COMPARISON: None.  TECHNIQUE: CT scan of the abdomen and pelvis was performed without IV contrast. Multiplanar reformats were obtained. Dose reduction techniques were used.  CONTRAST: None.    FINDINGS:   LOWER CHEST: There is coronary artery disease.  There is mild scarring at the lung bases.    HEPATOBILIARY: Normal.    PANCREAS: Normal.    SPLEEN: Normal.    ADRENAL GLANDS: Normal.    KIDNEYS/BLADDER: No renal or ureteral calculi. No hydronephrosis or hydroureter. There is wall thickening of the left aspect of the urinary bladder with adjacent stranding. There is a locule of air within the urinary bladder. Hypoattenuating renal   lesions are noted.     BOWEL: Normal.    LYMPH NODES: Normal.    VASCULATURE: Atherosclerotic disease.     PELVIC ORGANS: Air in the endometrial canal.     MUSCULOSKELETAL: Sclerosis in the right pubic tubercle. There are a few other small sclerotic lesions in the included bony structures. L4 and L5 surgical hardware is present. There is osteopenia.       Impression    IMPRESSION:   1.  Wall thickening of the left aspect of the urinary bladder with adjacent stranding. Cystitis is favored. An air locule in the urinary bladder may be related. Cannot assess for malignancy on this study.  2.  Air in the endometrial canal. Infection cannot be excluded.      UA with Microscopic reflex to Culture     Status: Abnormal    Specimen: Urine, Catheter   Result Value Ref Range    Color Urine Orange  (A) Colorless, Straw, Light Yellow, Yellow    Appearance Urine Slightly Cloudy (A) Clear    Glucose Urine Negative Negative mg/dL    Bilirubin Urine Negative Negative    Ketones Urine Negative Negative mg/dL    Specific Gravity Urine 1.011 1.003 - 1.035    Blood Urine Large (A) Negative    pH Urine 8.0 (H) 5.0 - 7.0    Protein Albumin Urine 30  (A) Negative mg/dL    Urobilinogen Urine Normal Normal, 2.0 mg/dL    Nitrite Urine Positive (A) Negative    Leukocyte Esterase Urine Large (A) Negative    Bacteria Urine Many (A) None Seen /HPF    WBC Clumps Urine Present (A) None Seen /HPF    Mucus Urine Present (A) None Seen /LPF    RBC Urine >182 (H) <=2 /HPF    WBC Urine >182 (H) <=5 /HPF    Transitional Epithelials Urine 1 <=1 /HPF    Narrative    Urine Culture ordered based on laboratory criteria   CBC with platelets and differential     Status: Abnormal   Result Value Ref Range    WBC Count 9.2 4.0 - 11.0 10e3/uL    RBC Count 3.61 (L) 3.80 - 5.20 10e6/uL    Hemoglobin 11.6 (L) 11.7 - 15.7 g/dL    Hematocrit 34.5 (L) 35.0 - 47.0 %    MCV 96 78 - 100 fL    MCH 32.1 26.5 - 33.0 pg    MCHC 33.6 31.5 - 36.5 g/dL    RDW 14.6 10.0 - 15.0 %    Platelet Count 221 150 - 450 10e3/uL    % Neutrophils 65 %    % Lymphocytes 22 %    % Monocytes 7 %    % Eosinophils 6 %    % Basophils 0 %    % Immature Granulocytes 0 %    NRBCs per 100 WBC 0 <1 /100    Absolute Neutrophils 6.0 1.6 - 8.3 10e3/uL    Absolute Lymphocytes 2.0 0.8 - 5.3 10e3/uL    Absolute Monocytes 0.6 0.0 - 1.3 10e3/uL    Absolute Eosinophils 0.5 0.0 - 0.7 10e3/uL    Absolute Basophils 0.0 0.0 - 0.2 10e3/uL    Absolute Immature Granulocytes 0.0 <=0.4 10e3/uL    Absolute NRBCs 0.0 10e3/uL   Basic metabolic panel     Status: Abnormal   Result Value Ref Range    Sodium 138 133 - 144 mmol/L    Potassium 4.6 3.4 - 5.3 mmol/L    Chloride 106 94 - 109 mmol/L    Carbon Dioxide (CO2) 26 20 - 32 mmol/L    Anion Gap 6 3 - 14 mmol/L    Urea Nitrogen 17 7 - 30 mg/dL    Creatinine  0.77 0.52 - 1.04 mg/dL    Calcium 9.8 8.5 - 10.1 mg/dL    Glucose 250 (H) 70 - 99 mg/dL    GFR Estimate 74 >60 mL/min/1.73m2   Glucose by meter     Status: Abnormal   Result Value Ref Range    GLUCOSE BY METER POCT 104 (H) 70 - 99 mg/dL   Urine Culture     Status: Abnormal    Specimen: Urine, Catheter   Result Value Ref Range    Culture >100,000 CFU/mL Klebsiella oxytoca (A)        Susceptibility    Klebsiella oxytoca - SUNG     Ampicillin*  Resistant ug/mL      * Intrinsically Resistant     Ampicillin/ Sulbactam  Susceptible ug/mL     Piperacillin/Tazobactam  Susceptible ug/mL     Cefazolin*  Susceptible ug/mL      * Cefazolin SUNG breakpoints are for the treatment of uncomplicated urinary tract infections. For the treatment of systemic infections, please contact the laboratory for additional testing.     Cefoxitin  Susceptible ug/mL     Ceftazidime  Susceptible ug/mL     Ceftriaxone  Susceptible ug/mL     Cefepime  Susceptible ug/mL     Gentamicin  Susceptible ug/mL     Tobramycin  Susceptible ug/mL     Ciprofloxacin  Susceptible ug/mL     Levofloxacin  Susceptible ug/mL     Nitrofurantoin  Intermediate ug/mL     Trimethoprim/Sulfamethoxazole  Susceptible ug/mL   CBC with platelets differential     Status: Abnormal    Narrative    The following orders were created for panel order CBC with platelets differential.  Procedure                               Abnormality         Status                     ---------                               -----------         ------                     CBC with platelets and d...[[id number]]  Abnormal            Final result                 Please view results for these tests on the individual orders.     Medications   ciprofloxacin (CIPRO) tablet 500 mg (500 mg Oral Given 6/3/22 2103)        Assessments & Plan (with Medical Decision Making)   Julio Grier is a 87 year old female with a complex medical history including stroke in 2011, T2DM, chronic constipation, and urinary incontinence  who presents with suprapubic pain and hematuria since early this morning. Symptoms are concerning for UTI vs bladder cancer vs stone. Cannot rule out rectal or vaginal bleed though less likely due to evidence of blood in the urine from straight cath.    Plan:  - Labs including CBC and BMP  - Straight catheter with UA and cultlure  - Non-con CT Pelvis due to evidence of blood in urine  - Start empiric Nitrofurantoin for UTI  - Refer to Gynecology for follow up  - Consider Urology referral if bleeding persists  - Consider TVUS or GI consult if concerned about vaginal or rectal bleed    CBC showed mild anemia and BMP showed hyperglycemia. Straight catheter showed small amount of blood in the urine, will proceed with admission and Urology referral.      Ramsey Ryder, MS4  [employer] [place] [employer]    --    ED Attending Physician Attestation    I Ramsey Sol MD, cared for this patient with the Medical Student. I performed, or re-performed, the physical exam and medical decision-making. I have verified the accuracy of all the medical student findings and documentation above, and have edited as necessary.    Summary of HPI, PE, ED Course   Patient is a 87 year old female evaluated in the emergency department for suprapubic discomfort and blood noted in her diaper. Exam notable for cathed urine specimen that did show blood and no evidence to suggest rectal or vaginal bleeding.  She did have some tenderness to palpation in the suprapubic area without rebound, guarding or other peritoneal signs. ED course notable for blood work which demonstrated no significant leukocytosis or anemia nor any signs of acute kidney injury.  Urinalysis and CT scan of the abdomen and pelvis stone protocol is pending.  Patient was signed out to the oncoming physician to follow-up with CT scan and urinalysis results and work with medical student on disposition.          Ramsey Sol MD  Emergency Medicine        I have reviewed the nursing notes. I have reviewed the findings, diagnosis, plan and need for follow up with the patient.        Final diagnoses:   Hematuria, unspecified type       --  Ramsey COLEY [facility] EMERGENCY DEPARTMENT  6/3/2022     Ramsey Sol MD  06/06/22 0822

## 2022-06-03 NOTE — ED TRIAGE NOTES
Pt started having some abdominal pain this morning and hematuria on three different episodes today. Denies any fevers.     Triage Assessment     Row Name 06/03/22 2167       Triage Assessment (Adult)    Airway WDL WDL       Respiratory WDL    Respiratory WDL WDL       Skin Circulation/Temperature WDL    Skin Circulation/Temperature WDL WDL       Cardiac WDL    Cardiac WDL WDL       Peripheral/Neurovascular WDL    Peripheral Neurovascular WDL WDL       Cognitive/Neuro/Behavioral WDL    Cognitive/Neuro/Behavioral WDL WDL

## 2022-06-05 LAB — BACTERIA UR CULT: ABNORMAL

## 2023-03-20 ENCOUNTER — APPOINTMENT (OUTPATIENT)
Dept: CT IMAGING | Facility: CLINIC | Age: 88
DRG: 521 | End: 2023-03-20
Attending: EMERGENCY MEDICINE
Payer: COMMERCIAL

## 2023-03-20 ENCOUNTER — APPOINTMENT (OUTPATIENT)
Dept: GENERAL RADIOLOGY | Facility: CLINIC | Age: 88
DRG: 521 | End: 2023-03-20
Attending: EMERGENCY MEDICINE
Payer: COMMERCIAL

## 2023-03-20 ENCOUNTER — HOSPITAL ENCOUNTER (INPATIENT)
Facility: CLINIC | Age: 88
LOS: 11 days | Discharge: HOME HEALTH | DRG: 521 | End: 2023-03-31
Attending: EMERGENCY MEDICINE | Admitting: SURGERY
Payer: COMMERCIAL

## 2023-03-20 DIAGNOSIS — R21 RASH: ICD-10-CM

## 2023-03-20 DIAGNOSIS — R91.8 PULMONARY MASS: ICD-10-CM

## 2023-03-20 DIAGNOSIS — R79.89 ELEVATED LACTIC ACID LEVEL: ICD-10-CM

## 2023-03-20 DIAGNOSIS — S72.002A CLOSED FRACTURE OF LEFT HIP, INITIAL ENCOUNTER (H): ICD-10-CM

## 2023-03-20 DIAGNOSIS — R79.89 ELEVATED TROPONIN: ICD-10-CM

## 2023-03-20 DIAGNOSIS — R52 PAIN: ICD-10-CM
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
## 2023-03-20 DIAGNOSIS — H04.123 DRY EYES: ICD-10-CM

## 2023-03-20 LAB
ALBUMIN SERPL BCG-MCNC: 3.5 G/DL (ref 3.5–5.2)
ALP SERPL-CCNC: 43 U/L (ref 35–104)
ALT SERPL W P-5'-P-CCNC: 7 U/L (ref 10–35)
ANION GAP SERPL CALCULATED.3IONS-SCNC: 13 MMOL/L (ref 7–15)
AST SERPL W P-5'-P-CCNC: 19 U/L (ref 10–35)
BASOPHILS # BLD AUTO: 0 10E3/UL (ref 0–0.2)
BASOPHILS NFR BLD AUTO: 0 %
BILIRUB SERPL-MCNC: 0.4 MG/DL
BUN SERPL-MCNC: 17.2 MG/DL (ref 8–23)
CALCIUM SERPL-MCNC: 9.1 MG/DL (ref 8.8–10.2)
CHLORIDE SERPL-SCNC: 107 MMOL/L (ref 98–107)
CK SERPL-CCNC: 45 U/L (ref 26–192)
CREAT SERPL-MCNC: 0.78 MG/DL (ref 0.51–0.95)
DEPRECATED HCO3 PLAS-SCNC: 20 MMOL/L (ref 22–29)
EOSINOPHIL # BLD AUTO: 0.4 10E3/UL (ref 0–0.7)
EOSINOPHIL NFR BLD AUTO: 4 %
ERYTHROCYTE [DISTWIDTH] IN BLOOD BY AUTOMATED COUNT: 14.8 % (ref 10–15)
GFR SERPL CREATININE-BSD FRML MDRD: 73 ML/MIN/1.73M2
GLUCOSE BLDC GLUCOMTR-MCNC: 164 MG/DL (ref 70–99)
GLUCOSE BLDC GLUCOMTR-MCNC: 77 MG/DL (ref 70–99)
GLUCOSE BLDC GLUCOMTR-MCNC: 92 MG/DL (ref 70–99)
GLUCOSE SERPL-MCNC: 192 MG/DL (ref 70–99)
HCT VFR BLD AUTO: 35.2 % (ref 35–47)
HGB BLD-MCNC: 11.4 G/DL (ref 11.7–15.7)
HOLD SPECIMEN: NORMAL
IMM GRANULOCYTES # BLD: 0.1 10E3/UL
IMM GRANULOCYTES NFR BLD: 1 %
INR PPP: 1.06 (ref 0.85–1.15)
LACTATE SERPL-SCNC: 3.9 MMOL/L (ref 0.7–2)
LACTATE SERPL-SCNC: 4.3 MMOL/L (ref 0.7–2)
LIPASE SERPL-CCNC: 12 U/L (ref 13–60)
LYMPHOCYTES # BLD AUTO: 1.6 10E3/UL (ref 0.8–5.3)
LYMPHOCYTES NFR BLD AUTO: 15 %
MCH RBC QN AUTO: 33.5 PG (ref 26.5–33)
MCHC RBC AUTO-ENTMCNC: 32.4 G/DL (ref 31.5–36.5)
MCV RBC AUTO: 104 FL (ref 78–100)
MONOCYTES # BLD AUTO: 0.6 10E3/UL (ref 0–1.3)
MONOCYTES NFR BLD AUTO: 6 %
NEUTROPHILS # BLD AUTO: 7.5 10E3/UL (ref 1.6–8.3)
NEUTROPHILS NFR BLD AUTO: 74 %
NRBC # BLD AUTO: 0 10E3/UL
NRBC BLD AUTO-RTO: 0 /100
PLATELET # BLD AUTO: 210 10E3/UL (ref 150–450)
POTASSIUM SERPL-SCNC: 4 MMOL/L (ref 3.4–5.3)
PROT SERPL-MCNC: 6 G/DL (ref 6.4–8.3)
RBC # BLD AUTO: 3.4 10E6/UL (ref 3.8–5.2)
SODIUM SERPL-SCNC: 140 MMOL/L (ref 136–145)
TROPONIN T SERPL HS-MCNC: 127 NG/L
TROPONIN T SERPL HS-MCNC: 30 NG/L
WBC # BLD AUTO: 10.2 10E3/UL (ref 4–11)

## 2023-03-20 PROCEDURE — 71260 CT THORAX DX C+: CPT | Mod: 26 | Performed by: RADIOLOGY

## 2023-03-20 PROCEDURE — 200N000002 HC R&B ICU UMMC

## 2023-03-20 PROCEDURE — 74177 CT ABD & PELVIS W/CONTRAST: CPT | Mod: 26 | Performed by: RADIOLOGY

## 2023-03-20 PROCEDURE — 73502 X-RAY EXAM HIP UNI 2-3 VIEWS: CPT

## 2023-03-20 PROCEDURE — 70450 CT HEAD/BRAIN W/O DYE: CPT

## 2023-03-20 PROCEDURE — 96374 THER/PROPH/DIAG INJ IV PUSH: CPT | Mod: 59 | Performed by: EMERGENCY MEDICINE

## 2023-03-20 PROCEDURE — 87077 CULTURE AEROBIC IDENTIFY: CPT | Performed by: EMERGENCY MEDICINE

## 2023-03-20 PROCEDURE — 74177 CT ABD & PELVIS W/CONTRAST: CPT

## 2023-03-20 PROCEDURE — 36415 COLL VENOUS BLD VENIPUNCTURE: CPT | Performed by: EMERGENCY MEDICINE

## 2023-03-20 PROCEDURE — 73552 X-RAY EXAM OF FEMUR 2/>: CPT | Mod: 26 | Performed by: RADIOLOGY

## 2023-03-20 PROCEDURE — 99291 CRITICAL CARE FIRST HOUR: CPT | Mod: 25 | Performed by: EMERGENCY MEDICINE

## 2023-03-20 PROCEDURE — 72131 CT LUMBAR SPINE W/O DYE: CPT

## 2023-03-20 PROCEDURE — 82962 GLUCOSE BLOOD TEST: CPT

## 2023-03-20 PROCEDURE — 85025 COMPLETE CBC W/AUTO DIFF WBC: CPT | Performed by: EMERGENCY MEDICINE

## 2023-03-20 PROCEDURE — 72131 CT LUMBAR SPINE W/O DYE: CPT | Mod: 26 | Performed by: RADIOLOGY

## 2023-03-20 PROCEDURE — 85610 PROTHROMBIN TIME: CPT | Performed by: EMERGENCY MEDICINE

## 2023-03-20 PROCEDURE — 83605 ASSAY OF LACTIC ACID: CPT | Performed by: EMERGENCY MEDICINE

## 2023-03-20 PROCEDURE — 93010 ELECTROCARDIOGRAM REPORT: CPT | Performed by: EMERGENCY MEDICINE

## 2023-03-20 PROCEDURE — 81001 URINALYSIS AUTO W/SCOPE: CPT | Performed by: EMERGENCY MEDICINE

## 2023-03-20 PROCEDURE — 250N000011 HC RX IP 250 OP 636: Performed by: EMERGENCY MEDICINE

## 2023-03-20 PROCEDURE — 84484 ASSAY OF TROPONIN QUANT: CPT | Performed by: EMERGENCY MEDICINE

## 2023-03-20 PROCEDURE — G0390 TRAUMA RESPONS W/HOSP CRITI: HCPCS | Performed by: EMERGENCY MEDICINE

## 2023-03-20 PROCEDURE — 83690 ASSAY OF LIPASE: CPT | Performed by: EMERGENCY MEDICINE

## 2023-03-20 PROCEDURE — 258N000003 HC RX IP 258 OP 636: Performed by: EMERGENCY MEDICINE

## 2023-03-20 PROCEDURE — 87086 URINE CULTURE/COLONY COUNT: CPT | Performed by: EMERGENCY MEDICINE

## 2023-03-20 PROCEDURE — 258N000003 HC RX IP 258 OP 636: Performed by: STUDENT IN AN ORGANIZED HEALTH CARE EDUCATION/TRAINING PROGRAM

## 2023-03-20 PROCEDURE — 80053 COMPREHEN METABOLIC PANEL: CPT | Performed by: EMERGENCY MEDICINE

## 2023-03-20 PROCEDURE — 93005 ELECTROCARDIOGRAM TRACING: CPT | Performed by: EMERGENCY MEDICINE

## 2023-03-20 PROCEDURE — 70450 CT HEAD/BRAIN W/O DYE: CPT | Mod: 26 | Performed by: RADIOLOGY

## 2023-03-20 PROCEDURE — 73552 X-RAY EXAM OF FEMUR 2/>: CPT | Mod: LT

## 2023-03-20 PROCEDURE — 73502 X-RAY EXAM HIP UNI 2-3 VIEWS: CPT | Mod: 26 | Performed by: RADIOLOGY

## 2023-03-20 PROCEDURE — 72125 CT NECK SPINE W/O DYE: CPT | Mod: 26 | Performed by: RADIOLOGY

## 2023-03-20 PROCEDURE — 72125 CT NECK SPINE W/O DYE: CPT

## 2023-03-20 PROCEDURE — 82550 ASSAY OF CK (CPK): CPT | Performed by: EMERGENCY MEDICINE

## 2023-03-20 PROCEDURE — 96376 TX/PRO/DX INJ SAME DRUG ADON: CPT | Performed by: EMERGENCY MEDICINE

## 2023-03-20 RX ORDER — IOPAMIDOL 755 MG/ML
55 INJECTION, SOLUTION INTRAVASCULAR ONCE
Status: COMPLETED | OUTPATIENT
Start: 2023-03-20 | End: 2023-03-20

## 2023-03-20 RX ORDER — FENTANYL CITRATE 50 UG/ML
12.5 INJECTION, SOLUTION INTRAMUSCULAR; INTRAVENOUS ONCE
Status: COMPLETED | OUTPATIENT
Start: 2023-03-20 | End: 2023-03-20

## 2023-03-20 RX ORDER — CEFTRIAXONE 1 G/1
1 INJECTION, POWDER, FOR SOLUTION INTRAMUSCULAR; INTRAVENOUS ONCE
Status: DISCONTINUED | OUTPATIENT
Start: 2023-03-20 | End: 2023-03-20

## 2023-03-20 RX ORDER — CEFEPIME HYDROCHLORIDE 1 G/1
1 INJECTION, POWDER, FOR SOLUTION INTRAMUSCULAR; INTRAVENOUS ONCE
Status: COMPLETED | OUTPATIENT
Start: 2023-03-20 | End: 2023-03-20

## 2023-03-20 RX ORDER — DEXTROSE, SODIUM CHLORIDE, SODIUM LACTATE, POTASSIUM CHLORIDE, AND CALCIUM CHLORIDE 5; .6; .31; .03; .02 G/100ML; G/100ML; G/100ML; G/100ML; G/100ML
INJECTION, SOLUTION INTRAVENOUS ONCE
Status: COMPLETED | OUTPATIENT
Start: 2023-03-20 | End: 2023-03-20

## 2023-03-20 RX ORDER — VANCOMYCIN HYDROCHLORIDE 1 G/200ML
1000 INJECTION, SOLUTION INTRAVENOUS ONCE
Status: COMPLETED | OUTPATIENT
Start: 2023-03-20 | End: 2023-03-20

## 2023-03-20 RX ADMIN — VANCOMYCIN HYDROCHLORIDE 1000 MG: 1 INJECTION, SOLUTION INTRAVENOUS at 22:59

## 2023-03-20 RX ADMIN — SODIUM CHLORIDE, POTASSIUM CHLORIDE, SODIUM LACTATE AND CALCIUM CHLORIDE 500 ML: 600; 310; 30; 20 INJECTION, SOLUTION INTRAVENOUS at 18:08

## 2023-03-20 RX ADMIN — SODIUM CHLORIDE, POTASSIUM CHLORIDE, SODIUM LACTATE AND CALCIUM CHLORIDE 500 ML: 600; 310; 30; 20 INJECTION, SOLUTION INTRAVENOUS at 22:29

## 2023-03-20 RX ADMIN — FENTANYL CITRATE 12.5 MCG: 50 INJECTION, SOLUTION INTRAMUSCULAR; INTRAVENOUS at 18:23

## 2023-03-20 RX ADMIN — CEFEPIME 1 G: 1 INJECTION, POWDER, FOR SOLUTION INTRAMUSCULAR; INTRAVENOUS at 21:44

## 2023-03-20 RX ADMIN — IOPAMIDOL 55 ML: 755 INJECTION, SOLUTION INTRAVENOUS at 20:42

## 2023-03-20 RX ADMIN — FENTANYL CITRATE 12.5 MCG: 50 INJECTION, SOLUTION INTRAMUSCULAR; INTRAVENOUS at 20:56

## 2023-03-20 RX ADMIN — SODIUM CHLORIDE, SODIUM LACTATE, POTASSIUM CHLORIDE, CALCIUM CHLORIDE AND DEXTROSE MONOHYDRATE: 5; 600; 310; 30; 20 INJECTION, SOLUTION INTRAVENOUS at 21:25

## 2023-03-20 ASSESSMENT — ACTIVITIES OF DAILY LIVING (ADL)
ADLS_ACUITY_SCORE: 39
ADLS_ACUITY_SCORE: 35
ADLS_ACUITY_SCORE: 39
ADLS_ACUITY_SCORE: 39

## 2023-03-20 NOTE — ED PROVIDER NOTES
[place] EMERGENCY DEPARTMENT ([facility])  3/20/23  History     Chief Complaint   Patient presents with     Fall     The history is provided by a relative, the patient and medical records. The history is limited by a language barrier.  used: family interpreting given urgency, staff working on getting formal .     Julio Grier is a 88 year old female, primarily French speaking, with PMH notable for stroke (1/2011, residual symptoms on left, chronic anticoagulation with Plavix,), DM2, et al., who presents to the ED for evaluation of left hip/leg pain after mechanical fall.     CURRENT PRESENTATION:   Patient presents to the ED by ambulance in concern for a fall from standing height.  Patient was reportedly trying to move to a commode, tripped somewhat and fell onto her left side, resulting in immediate pain to the left hip/proximal leg area. They report patient leaned too far forward and has trouble keeping her balance when doing so since her prior stroke, which apparently isn't a new issue for her.  They are unsure if she hit her head w/ this fall, but no LOC.   Has a hard time moving the left leg anyway, but worse now with pain.  Says she can feel the left leg normally, just has pain up by the hip, and hard to move leg bc of injury.      No prodrome of symptoms prior to fall, no syncope/near syncope preceding the fall/tripping on the way to the commode.  No headaches, vision changes, etc. No neck discomfort, no back discomfort.  No chest pain, palpitations, shortness of breath or cough.  Does report having some abdominal pain.  Family reports that she has had frequent UTIs. Say she should have one currently. No bowel changes.  No blood.  No other known  symptoms.  No rashes or skin changes, though the family reports that she has some ongoing issues with atopic dermatitis/eczema, for which she follows with Dermatology but no new rashes or skin changes. No other new symptoms  "or complaints at this time. Full ROS completed w/o additional findings.       Past Medical History  Past Medical History:   Diagnosis Date     Diabetes mellitus (H)      Unspecified cerebral artery occlusion with cerebral infarction      Past Surgical History:   Procedure Laterality Date     BACK SURGERY       ORTHOPEDIC SURGERY       amLODIPine (NORVASC) 2.5 MG tablet  atorvastatin (LIPITOR) 20 MG tablet  betamethasone valerate (VALISONE) 0.1 % external cream  calcium carbonate 600 mg-vitamin D 400 units (CALCIUM CARB-CHOLECALCIFEROL) 600-400 MG-UNIT per tablet  ciprofloxacin (CIPRO) 500 MG tablet  ciprofloxacin (CIPRO) 500 MG tablet  clopidogrel (PLAVIX) 75 MG tablet  fish oil-omega-3 fatty acids 1000 MG capsule  fluocinonide (LIDEX) 0.05 % external ointment  glipiZIDE (GLUCOTROL) 5 MG tablet  hydrOXYzine (ATARAX) 10 MG tablet  hypromellose-dextran (ARTIFICAL TEARS) 0.1-0.3 % ophthalmic solution  LANTUS SOLOSTAR 100 UNIT/ML soln  metformin (GLUCOPHAGE) 1000 MG tablet  mirtazapine (REMERON) 7.5 MG tablet  oxybutynin (DITROPAN-XL) 10 MG 24 hr tablet  simvastatin (ZOCOR) 20 MG tablet      Allergies   Allergen Reactions     Aspirin Unknown     Irbesartan Unknown     Penicillins Other (See Comments)     Pt has some sort of reaction at dentist office after PEN supposedly. Pt cannot verify nor can family. Pt has no reaction to \"stephane\" products as was previously indicated. This was verified by familly. All this reviewed with opthalmalogist and anestheiologist on 5/17/2018     Family History  No family history on file.  Social History   Social History     Tobacco Use     Smoking status: Never     Smokeless tobacco: Never   Substance Use Topics     Alcohol use: Not Currently     Drug use: Never      Past medical history, past surgical history, medications, allergies, family history, and social history were reviewed with the patient. No additional pertinent items.     Review of Systems  A complete review of systems was " performed with pertinent positives and negatives noted in the HPI, and all other systems negative.    Physical Exam     CONSTITUTIONAL: Well-developed and well-nourished. Awake and alert. Non-toxic appearance. No acute distress.   HENT:   - Head: Normocephalic and atraumatic.   - Ears: External ear grossly normal.   - Nose: Nose normal. No rhinorrhea. No epistaxis.   - Mouth/Throat: MMM  EYES: Conjunctivae and lids are normal. No scleral icterus.   NECK: Normal range of motion and phonation normal. Neck supple.  No tracheal deviation, no stridor. No edema or erythema noted.  CARDIOVASCULAR: HR was somewhat increased/mild tachycardia, regular rhythm and no appreciable abnormal heart sounds.  PULMONARY/CHEST: Normal work of breathing. No accessory muscle usage or stridor. No respiratory distress.  No appreciable abnormal breath sounds.  ABDOMEN: Soft, non-distended. Does have some diffuse tenderness, perhaps worse in the mid-abdomen. No peritoneal findings, no rigidity, rebound or guarding.  No palpable masses or abnormal pulsatility appreciated.  MUSCULOSKELETAL: Extremities warm and seemingly well perfused. Strong distal pulses and cap refill equal in all extremities. No edema or calf tenderness. Does have pain by left hip. No other obvious injuries, no joint swelling. No apparent UE findings.   NEUROLOGIC: Awake, alert. Not disoriented. No seizure activity. GCS 15. Has difficulty moving left leg at baseline w/ prior stroke, worse w/ injury today. Says she can feel on LLE normally. No apparent new neuro deficits appreciated.  SKIN: Skin is warm and dry. No rash noted. No diaphoresis. No pallor. No petechiae or purpura.  PSYCHIATRIC: Liimited by language barrier, but seemingly normal mood and affect. Speech and behavior normal. Thought processes linear.  No SI/HI reported.      ED Course, Procedures, & Data     ED Course as of 03/21/23 0204   Mon Mar 20, 2023   2004 XR read as having left femoral neck fracture.   Ortho and Trauma paged.  Patient will need to be admitted to Trauma as opposed to Ortho given her elevated lactate and they can get a medicine consult.  Remainder of imaging still pending.  We have asked CT to prioritize the patient because of the elevated lactate, still waiting for her to go to studies.   2114 Verified with PPM, would like this patient admitted to the Trauma service under Dr. Emily Ellsworth at ICU level of care. Per PPM there should be an ICU bed for the patient and we would not need to transfer. The Trauma team will be updating the covering SICU physician as well.  Orthopedics also updated.   2159 Troponin T, High Sensitivity(!!): 127  Increased from initial.  Patient did not have any chest pain, shortness of breath, syncope/near syncope or other prodrome to make me think she was having a cardiac event, as it sounded as though it was a mechanical fall.  Getting EKG, will update admitting Trauma team.   2215 Spoke with family again, they confirmed patient was completely coherent at the time of this fall, they think that she just leaned too far forward, which she has a history of doing, and that the patient just does not recognize or adjust as much as she may need after having her prior stroke and then fell as she has previously, but they think is worse given that she has thinned out over time. They have not noted any chest/breathing sx's prior to this event either.    2306 Trauma is caring for the patient.  Reach out to them to make sure they saw the CT scans, which they have.  They are ordering ID consult, will order the appropriate precautions for the patient, and I will notify charge RN to see if we can move the patient to a negative airflow room while awaiting patient placement upstairs in ICU.            EKG Interpretation:      Interpreted by Stephanie Read MD  Time reviewed: 22:25  Symptoms at time of EKG: increasing troponin   Rhythm: sinus tachycardia  Rate: 103 bpm  Axis: Normal  Ectopy:  none  Conduction: normal  ST Segments/ T Waves: Non-specific ST-T wave changes  Comparison to prior: 2/7/11  Clinical Impression: HR increased, no ectopy compared to previous that had notable ectopy, non-specific ST-T wave findings more notable      ED Course Selections:   Critical Care Addendum  My initial assessment, based on my review of prehospital provider report, review of vital signs, focused history, physical exam and discussion with family, established a high suspicion that Young BLANCA Grier has left femur fracture and severe sepsis (likely urologic source) w/ climbing lactate > 4 despite initial IVF, which requires immediate intervention, and therefore she is critically ill.     After the initial assessment, the care team initiated multiple lab tests, initiated IV fluid administration, initiated medication therapy with vancomycin, cefepime and consulted with Trauma (Trauma coordinating w/ SICU) and Orthopedics to provide stabilization care. Due to the critical nature of this patient, I reassessed nursing observations, vital signs, physical exam, review of cardiac rhythm monitor, 12 lead ECG analysis, mental status and neurologic status multiple times prior to her disposition.     Time also spent performing documentation, discussion with family to obtain medical information for decision making, reviewing test results, discussion with consultants and coordination of care.     Critical care time (excluding teaching time and procedures): 60 minutes.         Assessments & Plan   IMPRESSION:   88 year old female w/ PMH notable for notable for stroke (1/2011, residual symptoms on left, chronic anticoagulation with Plavix,), DM2, et al., who presents to the ED for evaluation of left hip/leg pain after mechanical fall.     Clinically, patient appears nontoxic, NAD.  Otherwise on examination, has some diffuse abdominal pain, pain at left hip. No new neuro deficits in the setting of prior stroke. Seems to be perfusing the  injured side well. No apparent joint effusions or other findings for infection from an MSK stand point. No obvious meningeal/encephalitis type findings.     Ddx includes, but not limited to, left hip fracture (favor fracture over dislocation based on exam, or of course could be combinationa thereof), does seem to be perfusing well and no findings for compartment syndrome. Considered other pelvic fractures/injury like a rami and perhaps that is causing her pain she describes as being by the hip/leg. Seems to have normal sensation per patient report. Considered low back injury w/ perhaps radiation of pain to the LLE. No obvious joint effusions or clear findings for infectious or inflammatory arthritis.  Skin normal, no findings for SSTI or necrotizing SSTI.    Incident sounds to have been mechanical and just tripping as she went to the commode without any syncopal events, and while they're unsure if she hit her head, no LOC or new neuro deficits from her prior stroke, no cardiac type symptoms reported, etc., and they can described the fall nature and say she was completely coherent the whole time, and I think unlikely that she had a medical syncopal event.     That said, has had frequent UTIs in the past and initial lactate here elevated and so could be septic, thinking urologic as most likely cause given associated abdominal pain, though could consider other intraabdominal cause, less likely PNA (no sx's for such), no rashes/skin changes for infection, bacteremia, et al.     PLAN:   - Labs, urine studies  --- Patient will likely need sy placed if hip is broken and will get us better UA, but is currently in hallway bed and so will need private space available to do so/will immediately upon being roomed  - Extensive imaging (head, spine, C/A/P, Risks/benefits of pursuing imaging reviewed and accepted. )  - Will need admission and specialty consultation pending those results    RESULTS:  Labs:   - Lactate elevated  w/o significant leukocytosis   Urine: Pending after sy able to be placed  Imaging: Written preliminary reports reviewed, not all finalized reports available  - Does at least have femoral neck fracture, abnormal lung findings (possible cancer vs. Infection), possible bladder and pancreatic findings. Finalized reports pending.   - Discussed w/ Radiology    INTERVENTIONS:   - Ortho consult  - Trauma consult/Admit  - IVF (giving judicously in incremental boluses, but will increase as able, of note 30cc/kg is only 1.2L for patient's weight)  - IV Fentanyl (12.5mcg IV x2)  - Abx: Vanc, cefepime ordered w/ patients elevated lactate (Discussed w/ ED Pharmacist, appears to have had Abx switched from other agent to cephalosporin in the past and think should be safe to try)    RE-EVALUATION:  - The patient's symptoms were improved w/ the pain medications  - Lactate increased to > 4's  - Pt otherwise continues to do well here in the ED, no acute issues or apparent concerning changes in vitals or clinical appearance.    DISCUSSIONS:  - w/ Ortho: They have seen and evaluated the patient here in the ED. They will follow with admission and plan for operative intervention once able to be medically stabilized.  - w/ Trauma: They have seen and evaluated the patient here in the ED. Reviewed patient/presentation, current state of workup/any pending studies. They will admit for further evaluation/management. They will admit to SICU, though they say they will be the Primary team and to admit under Dr. Page who is covering Trauma, and report they will contact the usual SICU team to discuss this patient's case and coordinate care and that we did not need to contact the SICU ourselves as Trauma is priamry. They will F/U pending studies as needed, coordinate w/ additional consulting services as needed. No additional requests/recommendations for workup/management for in the ED at this time.    - w/ Patient: I have reviewed the  available findings (with the exception to the CT C/A/P as these imaging results were not yet finalized and want to make sure have difinitive information about these results in case they have questions, and given the seriousness of the potential diagnoses (Cancer, etc.). Defer those discussions to the admitting team once finalized reports available. Otherwise reviewed the available findings,  plan with the patient and her family, and Ortho and Trauma have also spoken w/ patient and family as well. They expressed understanding and agreement with this plan. All questions answered to the best of our ability at this time.       DISPOSITION/PLANNING:  IMPRESSION:   - Fall (mechanical per history) w/ left femur fracture  - Elevated lactic acid  - Elevated troponin  - Multiple non-traumatic CT findings (see EMR/report for prelim, but report still needs to be finalized to confirm the multiple abnormal findings).   DISPOSITION:  - Admit to Trauma ICU  PENDING:   - Cultures, urine studies, finalized imaging reports  OTHER RECOMMENDATIONS:   - F/U finalized CT reports and once finalized discuss w/ patient and family  - Continue to evaluate from potential sepsis, cardiac et al conditions  - Consider ID consult, Med-Onc workups for findings  - Continue Ortho consult/manageent  - Pain consult for regional anesthesia as needed       ______________________________________________________________________        Stephanie Read MD  3/20/2023   [facility] EMERGENCY DEPARTMENT     Stephanie Read MD  03/22/23 1500

## 2023-03-20 NOTE — ED TRIAGE NOTES
Pt BIBA from home after pt fell off the commode today.Pt now complains of left leg and hip pain.Pt unable to tell if she hit her head or not.Pt son stated that pt blood sugar checked at home after the fall was 154.  Hx: Stroke with left side deficit.

## 2023-03-20 NOTE — LETTER
Transition Communication Hand-off for Care Transitions to Next Level of Care Provider    Name: Julio Grier  : 1934  MRN #: [id number]  Primary Care Provider: Liz Rodriguez     [facility] [address]  [place] [address]     Reason for Hospitalization:  Elevated troponin [R77.8]  Elevated lactic acid level [R79.89]  Fall, initial encounter [W19.XXXA]  Admit Date/Time: 3/20/2023  3:48 PM  Discharge Date: 3/31/2023  Payor Source: Payor: We[employer] / Plan: [employer] / Product Type: POS /            Reason for Communication Hand-off Referral: Avoidable readmission within 30 days    Discharge Plan: Home w/ home care PT/OT/Skilled Nursing and support from family      Needs    Flowsheet Row Most Recent Value   Equipment Currently Used at Home shower chair, wheelchair, manual, commode chair          Future Appointments   Date Time Provider Department Center   2023 11:00 AM Nurse, [facility]   2023 11:40 AM Jeffrey Paulino MD [facility]           Referrals     Future Labs/Procedures    Home Care Referral     Comments:    Your provider has ordered home health services. If you have not been contacted within 2 days of your discharge please call the selected Home Care agency listed on your Discharge document.  If a Home Care agency is NOT listed, please call [phone].            Doyle Celaya RN Care Coordinator    AVS/Discharge Summary is the source of truth; this is a helpful guide for improved communication of patient story

## 2023-03-21 ENCOUNTER — APPOINTMENT (OUTPATIENT)
Dept: CT IMAGING | Facility: CLINIC | Age: 88
DRG: 521 | End: 2023-03-21
Attending: STUDENT IN AN ORGANIZED HEALTH CARE EDUCATION/TRAINING PROGRAM
Payer: COMMERCIAL

## 2023-03-21 ENCOUNTER — APPOINTMENT (OUTPATIENT)
Dept: GENERAL RADIOLOGY | Facility: CLINIC | Age: 88
DRG: 521 | End: 2023-03-21
Attending: ORTHOPAEDIC SURGERY
Payer: COMMERCIAL

## 2023-03-21 ENCOUNTER — APPOINTMENT (OUTPATIENT)
Dept: GENERAL RADIOLOGY | Facility: CLINIC | Age: 88
DRG: 521 | End: 2023-03-21
Attending: STUDENT IN AN ORGANIZED HEALTH CARE EDUCATION/TRAINING PROGRAM
Payer: COMMERCIAL

## 2023-03-21 ENCOUNTER — ANESTHESIA (OUTPATIENT)
Dept: INTENSIVE CARE | Facility: CLINIC | Age: 88
DRG: 521 | End: 2023-03-21
Payer: COMMERCIAL

## 2023-03-21 ENCOUNTER — ANESTHESIA EVENT (OUTPATIENT)
Dept: SURGERY | Facility: CLINIC | Age: 88
DRG: 521 | End: 2023-03-21
Payer: COMMERCIAL

## 2023-03-21 ENCOUNTER — ANESTHESIA (OUTPATIENT)
Dept: SURGERY | Facility: CLINIC | Age: 88
DRG: 521 | End: 2023-03-21
Payer: COMMERCIAL

## 2023-03-21 ENCOUNTER — APPOINTMENT (OUTPATIENT)
Dept: CARDIOLOGY | Facility: CLINIC | Age: 88
DRG: 521 | End: 2023-03-21
Attending: NURSE PRACTITIONER
Payer: COMMERCIAL

## 2023-03-21 ENCOUNTER — ANESTHESIA EVENT (OUTPATIENT)
Dept: INTENSIVE CARE | Facility: CLINIC | Age: 88
DRG: 521 | End: 2023-03-21
Payer: COMMERCIAL

## 2023-03-21 LAB
ABO/RH(D): NORMAL
ALBUMIN UR-MCNC: 30 MG/DL
ANION GAP SERPL CALCULATED.3IONS-SCNC: 12 MMOL/L (ref 7–15)
ANION GAP SERPL CALCULATED.3IONS-SCNC: 12 MMOL/L (ref 7–15)
ANTIBODY SCREEN: NEGATIVE
APPEARANCE UR: ABNORMAL
ATRIAL RATE - MUSE: 100 BPM
BACTERIA #/AREA URNS HPF: ABNORMAL /HPF
BILIRUB UR QL STRIP: NEGATIVE
BUN SERPL-MCNC: 10.2 MG/DL (ref 8–23)
BUN SERPL-MCNC: 10.9 MG/DL (ref 8–23)
CALCIUM SERPL-MCNC: 8.6 MG/DL (ref 8.8–10.2)
CALCIUM SERPL-MCNC: 8.8 MG/DL (ref 8.8–10.2)
CHLORIDE SERPL-SCNC: 100 MMOL/L (ref 98–107)
CHLORIDE SERPL-SCNC: 101 MMOL/L (ref 98–107)
COLOR UR AUTO: YELLOW
CREAT SERPL-MCNC: 0.67 MG/DL (ref 0.51–0.95)
CREAT SERPL-MCNC: 0.67 MG/DL (ref 0.51–0.95)
DEPRECATED HCO3 PLAS-SCNC: 23 MMOL/L (ref 22–29)
DEPRECATED HCO3 PLAS-SCNC: 24 MMOL/L (ref 22–29)
DIASTOLIC BLOOD PRESSURE - MUSE: NORMAL MMHG
GFR SERPL CREATININE-BSD FRML MDRD: 84 ML/MIN/1.73M2
GFR SERPL CREATININE-BSD FRML MDRD: 84 ML/MIN/1.73M2
GLUCOSE BLDC GLUCOMTR-MCNC: 127 MG/DL (ref 70–99)
GLUCOSE BLDC GLUCOMTR-MCNC: 132 MG/DL (ref 70–99)
GLUCOSE BLDC GLUCOMTR-MCNC: 145 MG/DL (ref 70–99)
GLUCOSE BLDC GLUCOMTR-MCNC: 161 MG/DL (ref 70–99)
GLUCOSE BLDC GLUCOMTR-MCNC: 186 MG/DL (ref 70–99)
GLUCOSE BLDC GLUCOMTR-MCNC: 67 MG/DL (ref 70–99)
GLUCOSE BLDC GLUCOMTR-MCNC: 77 MG/DL (ref 70–99)
GLUCOSE BLDC GLUCOMTR-MCNC: 81 MG/DL (ref 70–99)
GLUCOSE BLDC GLUCOMTR-MCNC: 85 MG/DL (ref 70–99)
GLUCOSE BLDC GLUCOMTR-MCNC: 98 MG/DL (ref 70–99)
GLUCOSE SERPL-MCNC: 166 MG/DL (ref 70–99)
GLUCOSE SERPL-MCNC: 81 MG/DL (ref 70–99)
GLUCOSE UR STRIP-MCNC: NEGATIVE MG/DL
HGB BLD-MCNC: 10.3 G/DL (ref 11.7–15.7)
HGB UR QL STRIP: ABNORMAL
INTERPRETATION ECG - MUSE: NORMAL
KETONES UR STRIP-MCNC: NEGATIVE MG/DL
LACTATE SERPL-SCNC: 1.1 MMOL/L (ref 0.7–2)
LACTATE SERPL-SCNC: 2.9 MMOL/L (ref 0.7–2)
LEUKOCYTE ESTERASE UR QL STRIP: ABNORMAL
LVEF ECHO: NORMAL
MAGNESIUM SERPL-MCNC: 1.5 MG/DL (ref 1.7–2.3)
MRSA DNA SPEC QL NAA+PROBE: NEGATIVE
NITRATE UR QL: POSITIVE
P AXIS - MUSE: NORMAL DEGREES
PH UR STRIP: 7 [PH] (ref 5–7)
PHOSPHATE SERPL-MCNC: 2.7 MG/DL (ref 2.5–4.5)
PHOSPHATE SERPL-MCNC: 3.6 MG/DL (ref 2.5–4.5)
POTASSIUM SERPL-SCNC: 3.6 MMOL/L (ref 3.4–5.3)
POTASSIUM SERPL-SCNC: 3.7 MMOL/L (ref 3.4–5.3)
PR INTERVAL - MUSE: NORMAL MS
QRS DURATION - MUSE: 78 MS
QT - MUSE: 336 MS
QTC - MUSE: 437 MS
R AXIS - MUSE: 56 DEGREES
RBC URINE: 12 /HPF
SA TARGET DNA: NEGATIVE
SODIUM SERPL-SCNC: 135 MMOL/L (ref 136–145)
SODIUM SERPL-SCNC: 137 MMOL/L (ref 136–145)
SP GR UR STRIP: 1.02 (ref 1–1.03)
SPECIMEN EXPIRATION DATE: NORMAL
SYSTOLIC BLOOD PRESSURE - MUSE: NORMAL MMHG
T AXIS - MUSE: 52 DEGREES
TROPONIN T SERPL HS-MCNC: 103 NG/L
TROPONIN T SERPL HS-MCNC: 88 NG/L
UROBILINOGEN UR STRIP-MCNC: NORMAL MG/DL
VENTRICULAR RATE- MUSE: 102 BPM
WBC CLUMPS #/AREA URNS HPF: PRESENT /HPF
WBC URINE: >182 /HPF

## 2023-03-21 PROCEDURE — 70450 CT HEAD/BRAIN W/O DYE: CPT | Mod: 26 | Performed by: STUDENT IN AN ORGANIZED HEALTH CARE EDUCATION/TRAINING PROGRAM

## 2023-03-21 PROCEDURE — 86901 BLOOD TYPING SEROLOGIC RH(D): CPT

## 2023-03-21 PROCEDURE — 999N000065 XR HIP LEFT 2-3 VIEWS

## 2023-03-21 PROCEDURE — 360N000077 HC SURGERY LEVEL 4, PER MIN: Performed by: ORTHOPAEDIC SURGERY

## 2023-03-21 PROCEDURE — 84100 ASSAY OF PHOSPHORUS: CPT | Performed by: PHYSICIAN ASSISTANT

## 2023-03-21 PROCEDURE — 258N000003 HC RX IP 258 OP 636: Performed by: NURSE PRACTITIONER

## 2023-03-21 PROCEDURE — 250N000011 HC RX IP 250 OP 636: Performed by: NURSE ANESTHETIST, CERTIFIED REGISTERED

## 2023-03-21 PROCEDURE — 93010 ELECTROCARDIOGRAM REPORT: CPT | Mod: 59 | Performed by: INTERNAL MEDICINE

## 2023-03-21 PROCEDURE — 85018 HEMOGLOBIN: CPT | Performed by: PHYSICIAN ASSISTANT

## 2023-03-21 PROCEDURE — 93306 TTE W/DOPPLER COMPLETE: CPT | Mod: 26 | Performed by: STUDENT IN AN ORGANIZED HEALTH CARE EDUCATION/TRAINING PROGRAM

## 2023-03-21 PROCEDURE — 200N000002 HC R&B ICU UMMC

## 2023-03-21 PROCEDURE — 27236 TREAT THIGH FRACTURE: CPT | Mod: LT | Performed by: ORTHOPAEDIC SURGERY

## 2023-03-21 PROCEDURE — 370N000017 HC ANESTHESIA TECHNICAL FEE, PER MIN: Performed by: ORTHOPAEDIC SURGERY

## 2023-03-21 PROCEDURE — 999N000180 XR SURGERY CARM FLUORO LESS THAN 5 MIN

## 2023-03-21 PROCEDURE — 86850 RBC ANTIBODY SCREEN: CPT

## 2023-03-21 PROCEDURE — 258N000003 HC RX IP 258 OP 636: Performed by: STUDENT IN AN ORGANIZED HEALTH CARE EDUCATION/TRAINING PROGRAM

## 2023-03-21 PROCEDURE — 70450 CT HEAD/BRAIN W/O DYE: CPT

## 2023-03-21 PROCEDURE — 99232 SBSQ HOSP IP/OBS MODERATE 35: CPT | Performed by: NURSE PRACTITIONER

## 2023-03-21 PROCEDURE — 80048 BASIC METABOLIC PNL TOTAL CA: CPT | Performed by: PHYSICIAN ASSISTANT

## 2023-03-21 PROCEDURE — 83605 ASSAY OF LACTIC ACID: CPT | Performed by: PHYSICIAN ASSISTANT

## 2023-03-21 PROCEDURE — 0SRS019 REPLACEMENT OF LEFT HIP JOINT, FEMORAL SURFACE WITH METAL SYNTHETIC SUBSTITUTE, CEMENTED, OPEN APPROACH: ICD-10-PCS | Performed by: ORTHOPAEDIC SURGERY

## 2023-03-21 PROCEDURE — 87641 MR-STAPH DNA AMP PROBE: CPT | Performed by: STUDENT IN AN ORGANIZED HEALTH CARE EDUCATION/TRAINING PROGRAM

## 2023-03-21 PROCEDURE — 258N000001 HC RX 258: Performed by: ORTHOPAEDIC SURGERY

## 2023-03-21 PROCEDURE — 250N000009 HC RX 250: Performed by: NURSE ANESTHETIST, CERTIFIED REGISTERED

## 2023-03-21 PROCEDURE — 99233 SBSQ HOSP IP/OBS HIGH 50: CPT | Performed by: PHYSICIAN ASSISTANT

## 2023-03-21 PROCEDURE — 80048 BASIC METABOLIC PNL TOTAL CA: CPT | Performed by: SURGERY

## 2023-03-21 PROCEDURE — 250N000025 HC SEVOFLURANE, PER MIN: Performed by: ORTHOPAEDIC SURGERY

## 2023-03-21 PROCEDURE — 250N000013 HC RX MED GY IP 250 OP 250 PS 637: Performed by: STUDENT IN AN ORGANIZED HEALTH CARE EDUCATION/TRAINING PROGRAM

## 2023-03-21 PROCEDURE — 83735 ASSAY OF MAGNESIUM: CPT | Performed by: PHYSICIAN ASSISTANT

## 2023-03-21 PROCEDURE — 84484 ASSAY OF TROPONIN QUANT: CPT | Performed by: STUDENT IN AN ORGANIZED HEALTH CARE EDUCATION/TRAINING PROGRAM

## 2023-03-21 PROCEDURE — 83605 ASSAY OF LACTIC ACID: CPT | Performed by: STUDENT IN AN ORGANIZED HEALTH CARE EDUCATION/TRAINING PROGRAM

## 2023-03-21 PROCEDURE — 73501 X-RAY EXAM HIP UNI 1 VIEW: CPT | Mod: 26 | Performed by: ORTHOPAEDIC SURGERY

## 2023-03-21 PROCEDURE — 36415 COLL VENOUS BLD VENIPUNCTURE: CPT | Performed by: STUDENT IN AN ORGANIZED HEALTH CARE EDUCATION/TRAINING PROGRAM

## 2023-03-21 PROCEDURE — 93005 ELECTROCARDIOGRAM TRACING: CPT

## 2023-03-21 PROCEDURE — 250N000011 HC RX IP 250 OP 636: Performed by: STUDENT IN AN ORGANIZED HEALTH CARE EDUCATION/TRAINING PROGRAM

## 2023-03-21 PROCEDURE — 258N000003 HC RX IP 258 OP 636: Performed by: NURSE ANESTHETIST, CERTIFIED REGISTERED

## 2023-03-21 PROCEDURE — 999N000141 HC STATISTIC PRE-PROCEDURE NURSING ASSESSMENT: Performed by: ORTHOPAEDIC SURGERY

## 2023-03-21 PROCEDURE — 250N000011 HC RX IP 250 OP 636: Performed by: NURSE PRACTITIONER

## 2023-03-21 PROCEDURE — 710N000010 HC RECOVERY PHASE 1, LEVEL 2, PER MIN: Performed by: ORTHOPAEDIC SURGERY

## 2023-03-21 PROCEDURE — 99223 1ST HOSP IP/OBS HIGH 75: CPT | Mod: GC

## 2023-03-21 PROCEDURE — 36415 COLL VENOUS BLD VENIPUNCTURE: CPT | Performed by: PHYSICIAN ASSISTANT

## 2023-03-21 PROCEDURE — 250N000009 HC RX 250: Performed by: ORTHOPAEDIC SURGERY

## 2023-03-21 PROCEDURE — 93306 TTE W/DOPPLER COMPLETE: CPT

## 2023-03-21 PROCEDURE — 272N000001 HC OR GENERAL SUPPLY STERILE: Performed by: ORTHOPAEDIC SURGERY

## 2023-03-21 PROCEDURE — C1776 JOINT DEVICE (IMPLANTABLE): HCPCS | Performed by: ORTHOPAEDIC SURGERY

## 2023-03-21 PROCEDURE — C1713 ANCHOR/SCREW BN/BN,TIS/BN: HCPCS | Performed by: ORTHOPAEDIC SURGERY

## 2023-03-21 DEVICE — HIP STEM
Type: IMPLANTABLE DEVICE | Site: HIP | Status: FUNCTIONAL
Brand: OMNIFIT

## 2023-03-21 DEVICE — UNIVERSAL DISTAL CEMENT SPACER
Type: IMPLANTABLE DEVICE | Site: HIP | Status: FUNCTIONAL
Brand: OMNIFIT

## 2023-03-21 DEVICE — BUCK FEMORAL CEMENT RESTRICTOR 18.5MM
Type: IMPLANTABLE DEVICE | Site: HIP | Status: FUNCTIONAL
Brand: BUCK

## 2023-03-21 DEVICE — BIPOLAR COMPONENT
Type: IMPLANTABLE DEVICE | Site: HIP | Status: FUNCTIONAL
Brand: UHR

## 2023-03-21 DEVICE — LOW FRICTION ION TREATMENT
Type: IMPLANTABLE DEVICE | Site: HIP | Status: FUNCTIONAL
Brand: C-TAPER HEAD

## 2023-03-21 DEVICE — FULL DOSE BONE CEMENT, 10 PACK CATALOG NUMBER IS 6191-1-010
Type: IMPLANTABLE DEVICE | Site: HIP | Status: FUNCTIONAL
Brand: SIMPLEX

## 2023-03-21 RX ORDER — ONDANSETRON 2 MG/ML
4 INJECTION INTRAMUSCULAR; INTRAVENOUS EVERY 6 HOURS PRN
Status: DISCONTINUED | OUTPATIENT
Start: 2023-03-21 | End: 2023-03-31 | Stop reason: HOSPADM

## 2023-03-21 RX ORDER — CEFAZOLIN SODIUM 1 G/3ML
1 INJECTION, POWDER, FOR SOLUTION INTRAMUSCULAR; INTRAVENOUS EVERY 8 HOURS
Status: DISPENSED | OUTPATIENT
Start: 2023-03-21 | End: 2023-03-22

## 2023-03-21 RX ORDER — CEFAZOLIN SODIUM 1 G/3ML
INJECTION, POWDER, FOR SOLUTION INTRAMUSCULAR; INTRAVENOUS PRN
Status: DISCONTINUED | OUTPATIENT
Start: 2023-03-21 | End: 2023-03-21

## 2023-03-21 RX ORDER — SODIUM CHLORIDE, SODIUM LACTATE, POTASSIUM CHLORIDE, CALCIUM CHLORIDE 600; 310; 30; 20 MG/100ML; MG/100ML; MG/100ML; MG/100ML
INJECTION, SOLUTION INTRAVENOUS CONTINUOUS
Status: DISCONTINUED | OUTPATIENT
Start: 2023-03-21 | End: 2023-03-21 | Stop reason: HOSPADM

## 2023-03-21 RX ORDER — NICOTINE POLACRILEX 4 MG
15-30 LOZENGE BUCCAL
Status: DISCONTINUED | OUTPATIENT
Start: 2023-03-21 | End: 2023-03-23

## 2023-03-21 RX ORDER — NALOXONE HYDROCHLORIDE 0.4 MG/ML
0.4 INJECTION, SOLUTION INTRAMUSCULAR; INTRAVENOUS; SUBCUTANEOUS
Status: DISCONTINUED | OUTPATIENT
Start: 2023-03-21 | End: 2023-03-31 | Stop reason: HOSPADM

## 2023-03-21 RX ORDER — FENTANYL CITRATE 50 UG/ML
25-50 INJECTION, SOLUTION INTRAMUSCULAR; INTRAVENOUS
Status: CANCELLED | OUTPATIENT
Start: 2023-03-21

## 2023-03-21 RX ORDER — POLYETHYLENE GLYCOL 3350 17 G/17G
17 POWDER, FOR SOLUTION ORAL DAILY
Status: DISCONTINUED | OUTPATIENT
Start: 2023-03-21 | End: 2023-03-31 | Stop reason: HOSPADM

## 2023-03-21 RX ORDER — GLIPIZIDE 10 MG/1
2 TABLET ORAL 4 TIMES DAILY PRN
Status: DISCONTINUED | OUTPATIENT
Start: 2023-03-21 | End: 2023-03-31 | Stop reason: HOSPADM

## 2023-03-21 RX ORDER — ONDANSETRON 4 MG/1
4 TABLET, ORALLY DISINTEGRATING ORAL EVERY 6 HOURS PRN
Status: DISCONTINUED | OUTPATIENT
Start: 2023-03-21 | End: 2023-03-31 | Stop reason: HOSPADM

## 2023-03-21 RX ORDER — FLUMAZENIL 0.1 MG/ML
0.2 INJECTION, SOLUTION INTRAVENOUS
Status: CANCELLED | OUTPATIENT
Start: 2023-03-21

## 2023-03-21 RX ORDER — FENTANYL CITRATE 50 UG/ML
25 INJECTION, SOLUTION INTRAMUSCULAR; INTRAVENOUS EVERY 5 MIN PRN
Status: DISCONTINUED | OUTPATIENT
Start: 2023-03-21 | End: 2023-03-21 | Stop reason: HOSPADM

## 2023-03-21 RX ORDER — ACETAMINOPHEN 325 MG/1
650 TABLET ORAL ONCE
Status: COMPLETED | OUTPATIENT
Start: 2023-03-21 | End: 2023-03-21

## 2023-03-21 RX ORDER — NALOXONE HYDROCHLORIDE 0.4 MG/ML
0.4 INJECTION, SOLUTION INTRAMUSCULAR; INTRAVENOUS; SUBCUTANEOUS
Status: CANCELLED | OUTPATIENT
Start: 2023-03-21

## 2023-03-21 RX ORDER — MAGNESIUM HYDROXIDE 1200 MG/15ML
LIQUID ORAL PRN
Status: DISCONTINUED | OUTPATIENT
Start: 2023-03-21 | End: 2023-03-21 | Stop reason: HOSPADM

## 2023-03-21 RX ORDER — DEXTROSE MONOHYDRATE 25 G/50ML
25-50 INJECTION, SOLUTION INTRAVENOUS
Status: DISCONTINUED | OUTPATIENT
Start: 2023-03-21 | End: 2023-03-23

## 2023-03-21 RX ORDER — ACETAMINOPHEN 325 MG/1
650 TABLET ORAL EVERY 4 HOURS PRN
Status: DISCONTINUED | OUTPATIENT
Start: 2023-03-21 | End: 2023-03-31 | Stop reason: HOSPADM

## 2023-03-21 RX ORDER — NALOXONE HYDROCHLORIDE 0.4 MG/ML
0.2 INJECTION, SOLUTION INTRAMUSCULAR; INTRAVENOUS; SUBCUTANEOUS
Status: CANCELLED | OUTPATIENT
Start: 2023-03-21

## 2023-03-21 RX ORDER — FENTANYL CITRATE 50 UG/ML
INJECTION, SOLUTION INTRAMUSCULAR; INTRAVENOUS PRN
Status: DISCONTINUED | OUTPATIENT
Start: 2023-03-21 | End: 2023-03-21

## 2023-03-21 RX ORDER — ONDANSETRON 2 MG/ML
4 INJECTION INTRAMUSCULAR; INTRAVENOUS EVERY 30 MIN PRN
Status: DISCONTINUED | OUTPATIENT
Start: 2023-03-21 | End: 2023-03-21 | Stop reason: HOSPADM

## 2023-03-21 RX ORDER — PROPOFOL 10 MG/ML
INJECTION, EMULSION INTRAVENOUS PRN
Status: DISCONTINUED | OUTPATIENT
Start: 2023-03-21 | End: 2023-03-21

## 2023-03-21 RX ORDER — HYDROMORPHONE HYDROCHLORIDE 1 MG/ML
0.2 INJECTION, SOLUTION INTRAMUSCULAR; INTRAVENOUS; SUBCUTANEOUS EVERY 5 MIN PRN
Status: DISCONTINUED | OUTPATIENT
Start: 2023-03-21 | End: 2023-03-21 | Stop reason: HOSPADM

## 2023-03-21 RX ORDER — OXYCODONE HYDROCHLORIDE 5 MG/1
5 TABLET ORAL EVERY 4 HOURS PRN
Status: DISCONTINUED | OUTPATIENT
Start: 2023-03-21 | End: 2023-03-22

## 2023-03-21 RX ORDER — CEFAZOLIN SODIUM 2 G/100ML
2 INJECTION, SOLUTION INTRAVENOUS SEE ADMIN INSTRUCTIONS
Status: CANCELLED | OUTPATIENT
Start: 2023-03-21

## 2023-03-21 RX ORDER — ACETAMINOPHEN 325 MG/10.15ML
650 LIQUID ORAL EVERY 4 HOURS PRN
Status: DISCONTINUED | OUTPATIENT
Start: 2023-03-21 | End: 2023-03-31 | Stop reason: HOSPADM

## 2023-03-21 RX ORDER — NALOXONE HYDROCHLORIDE 0.4 MG/ML
0.2 INJECTION, SOLUTION INTRAMUSCULAR; INTRAVENOUS; SUBCUTANEOUS
Status: DISCONTINUED | OUTPATIENT
Start: 2023-03-21 | End: 2023-03-31 | Stop reason: HOSPADM

## 2023-03-21 RX ORDER — HYDRALAZINE HYDROCHLORIDE 20 MG/ML
10 INJECTION INTRAMUSCULAR; INTRAVENOUS EVERY 6 HOURS PRN
Status: DISCONTINUED | OUTPATIENT
Start: 2023-03-21 | End: 2023-03-31 | Stop reason: HOSPADM

## 2023-03-21 RX ORDER — BUPIVACAINE HYDROCHLORIDE 5 MG/ML
INJECTION, SOLUTION PERINEURAL PRN
Status: DISCONTINUED | OUTPATIENT
Start: 2023-03-21 | End: 2023-03-21 | Stop reason: HOSPADM

## 2023-03-21 RX ORDER — CEFTRIAXONE 1 G/1
1 INJECTION, POWDER, FOR SOLUTION INTRAMUSCULAR; INTRAVENOUS EVERY 24 HOURS
Status: DISCONTINUED | OUTPATIENT
Start: 2023-03-21 | End: 2023-03-24

## 2023-03-21 RX ORDER — AMOXICILLIN 250 MG
1 CAPSULE ORAL 2 TIMES DAILY PRN
Status: DISCONTINUED | OUTPATIENT
Start: 2023-03-21 | End: 2023-03-31 | Stop reason: HOSPADM

## 2023-03-21 RX ORDER — ONDANSETRON 4 MG/1
4 TABLET, ORALLY DISINTEGRATING ORAL EVERY 30 MIN PRN
Status: DISCONTINUED | OUTPATIENT
Start: 2023-03-21 | End: 2023-03-21 | Stop reason: HOSPADM

## 2023-03-21 RX ORDER — ACETAMINOPHEN 325 MG/1
650 TABLET ORAL ONCE
Status: DISCONTINUED | OUTPATIENT
Start: 2023-03-21 | End: 2023-03-21 | Stop reason: HOSPADM

## 2023-03-21 RX ORDER — LIDOCAINE HYDROCHLORIDE 20 MG/ML
INJECTION, SOLUTION INFILTRATION; PERINEURAL PRN
Status: DISCONTINUED | OUTPATIENT
Start: 2023-03-21 | End: 2023-03-21

## 2023-03-21 RX ORDER — TRANEXAMIC ACID 650 MG/1
1950 TABLET ORAL ONCE
Status: CANCELLED | OUTPATIENT
Start: 2023-03-21 | End: 2023-03-21

## 2023-03-21 RX ORDER — NICOTINE POLACRILEX 4 MG
15-30 LOZENGE BUCCAL
Status: DISCONTINUED | OUTPATIENT
Start: 2023-03-21 | End: 2023-03-21

## 2023-03-21 RX ORDER — SODIUM CHLORIDE, SODIUM LACTATE, POTASSIUM CHLORIDE, CALCIUM CHLORIDE 600; 310; 30; 20 MG/100ML; MG/100ML; MG/100ML; MG/100ML
INJECTION, SOLUTION INTRAVENOUS CONTINUOUS PRN
Status: DISCONTINUED | OUTPATIENT
Start: 2023-03-21 | End: 2023-03-21

## 2023-03-21 RX ORDER — DEXAMETHASONE SODIUM PHOSPHATE 4 MG/ML
INJECTION, SOLUTION INTRA-ARTICULAR; INTRALESIONAL; INTRAMUSCULAR; INTRAVENOUS; SOFT TISSUE PRN
Status: DISCONTINUED | OUTPATIENT
Start: 2023-03-21 | End: 2023-03-21

## 2023-03-21 RX ORDER — DEXMEDETOMIDINE HYDROCHLORIDE 4 UG/ML
INJECTION, SOLUTION INTRAVENOUS PRN
Status: DISCONTINUED | OUTPATIENT
Start: 2023-03-21 | End: 2023-03-21

## 2023-03-21 RX ORDER — METHOCARBAMOL 500 MG/1
500 TABLET, FILM COATED ORAL EVERY 4 HOURS PRN
Status: DISCONTINUED | OUTPATIENT
Start: 2023-03-21 | End: 2023-03-31 | Stop reason: HOSPADM

## 2023-03-21 RX ORDER — ACETAMINOPHEN 325 MG/1
975 TABLET ORAL EVERY 8 HOURS
Status: DISCONTINUED | OUTPATIENT
Start: 2023-03-21 | End: 2023-03-31 | Stop reason: HOSPADM

## 2023-03-21 RX ORDER — ATORVASTATIN CALCIUM 20 MG/1
20 TABLET, FILM COATED ORAL DAILY
Status: DISCONTINUED | OUTPATIENT
Start: 2023-03-21 | End: 2023-03-31 | Stop reason: HOSPADM

## 2023-03-21 RX ORDER — AMOXICILLIN 250 MG
2 CAPSULE ORAL 2 TIMES DAILY PRN
Status: DISCONTINUED | OUTPATIENT
Start: 2023-03-21 | End: 2023-03-31 | Stop reason: HOSPADM

## 2023-03-21 RX ORDER — DEXTROSE MONOHYDRATE 25 G/50ML
25-50 INJECTION, SOLUTION INTRAVENOUS
Status: DISCONTINUED | OUTPATIENT
Start: 2023-03-21 | End: 2023-03-21

## 2023-03-21 RX ORDER — CEFAZOLIN SODIUM 2 G/100ML
2 INJECTION, SOLUTION INTRAVENOUS
Status: CANCELLED | OUTPATIENT
Start: 2023-03-21

## 2023-03-21 RX ORDER — TRIAMCINOLONE ACETONIDE 1 MG/G
CREAM TOPICAL 2 TIMES DAILY
Status: DISCONTINUED | OUTPATIENT
Start: 2023-03-21 | End: 2023-03-31 | Stop reason: HOSPADM

## 2023-03-21 RX ORDER — HYDROMORPHONE HCL IN WATER/PF 6 MG/30 ML
0.2 PATIENT CONTROLLED ANALGESIA SYRINGE INTRAVENOUS
Status: DISCONTINUED | OUTPATIENT
Start: 2023-03-21 | End: 2023-03-31 | Stop reason: HOSPADM

## 2023-03-21 RX ORDER — SODIUM CHLORIDE, SODIUM LACTATE, POTASSIUM CHLORIDE, CALCIUM CHLORIDE 600; 310; 30; 20 MG/100ML; MG/100ML; MG/100ML; MG/100ML
INJECTION, SOLUTION INTRAVENOUS CONTINUOUS
Status: DISCONTINUED | OUTPATIENT
Start: 2023-03-21 | End: 2023-03-21

## 2023-03-21 RX ORDER — ONDANSETRON 2 MG/ML
INJECTION INTRAMUSCULAR; INTRAVENOUS PRN
Status: DISCONTINUED | OUTPATIENT
Start: 2023-03-21 | End: 2023-03-21

## 2023-03-21 RX ORDER — LEVOFLOXACIN 250 MG/1
250 TABLET, FILM COATED ORAL DAILY
Status: DISCONTINUED | OUTPATIENT
Start: 2023-03-21 | End: 2023-03-21

## 2023-03-21 RX ADMIN — FENTANYL CITRATE 25 MCG: 50 INJECTION, SOLUTION INTRAMUSCULAR; INTRAVENOUS at 21:19

## 2023-03-21 RX ADMIN — ONDANSETRON 4 MG: 2 INJECTION INTRAMUSCULAR; INTRAVENOUS at 21:08

## 2023-03-21 RX ADMIN — DEXAMETHASONE SODIUM PHOSPHATE 4 MG: 4 INJECTION, SOLUTION INTRA-ARTICULAR; INTRALESIONAL; INTRAMUSCULAR; INTRAVENOUS; SOFT TISSUE at 20:03

## 2023-03-21 RX ADMIN — DEXTROSE AND SODIUM CHLORIDE: 5; 900 INJECTION, SOLUTION INTRAVENOUS at 13:10

## 2023-03-21 RX ADMIN — Medication 6 MCG: at 19:22

## 2023-03-21 RX ADMIN — LIDOCAINE HYDROCHLORIDE 40 MG: 20 INJECTION, SOLUTION INFILTRATION; PERINEURAL at 19:22

## 2023-03-21 RX ADMIN — HYDROMORPHONE HYDROCHLORIDE 0.2 MG: 0.2 INJECTION, SOLUTION INTRAMUSCULAR; INTRAVENOUS; SUBCUTANEOUS at 04:56

## 2023-03-21 RX ADMIN — SODIUM CHLORIDE, POTASSIUM CHLORIDE, SODIUM LACTATE AND CALCIUM CHLORIDE: 600; 310; 30; 20 INJECTION, SOLUTION INTRAVENOUS at 11:04

## 2023-03-21 RX ADMIN — HYDROMORPHONE HYDROCHLORIDE 0.2 MG: 0.2 INJECTION, SOLUTION INTRAMUSCULAR; INTRAVENOUS; SUBCUTANEOUS at 16:53

## 2023-03-21 RX ADMIN — PROPOFOL 40 MG: 10 INJECTION, EMULSION INTRAVENOUS at 19:22

## 2023-03-21 RX ADMIN — HYDROMORPHONE HYDROCHLORIDE 0.2 MG: 0.2 INJECTION, SOLUTION INTRAMUSCULAR; INTRAVENOUS; SUBCUTANEOUS at 02:15

## 2023-03-21 RX ADMIN — FENTANYL CITRATE 25 MCG: 50 INJECTION, SOLUTION INTRAMUSCULAR; INTRAVENOUS at 20:10

## 2023-03-21 RX ADMIN — CEFTRIAXONE SODIUM 1 G: 1 INJECTION, POWDER, FOR SOLUTION INTRAMUSCULAR; INTRAVENOUS at 15:26

## 2023-03-21 RX ADMIN — LEVOFLOXACIN 250 MG: 250 TABLET, FILM COATED ORAL at 10:25

## 2023-03-21 RX ADMIN — SUGAMMADEX 100 MG: 100 INJECTION, SOLUTION INTRAVENOUS at 21:10

## 2023-03-21 RX ADMIN — DEXTROSE 15 G: 15 GEL ORAL at 12:42

## 2023-03-21 RX ADMIN — HYDROMORPHONE HYDROCHLORIDE 0.2 MG: 0.2 INJECTION, SOLUTION INTRAMUSCULAR; INTRAVENOUS; SUBCUTANEOUS at 15:12

## 2023-03-21 RX ADMIN — CEFAZOLIN 2 G: 1 INJECTION, POWDER, FOR SOLUTION INTRAMUSCULAR; INTRAVENOUS at 19:20

## 2023-03-21 RX ADMIN — SODIUM CHLORIDE, POTASSIUM CHLORIDE, SODIUM LACTATE AND CALCIUM CHLORIDE 1000 ML: 600; 310; 30; 20 INJECTION, SOLUTION INTRAVENOUS at 02:15

## 2023-03-21 RX ADMIN — PHENYLEPHRINE HYDROCHLORIDE 100 MCG: 10 INJECTION INTRAVENOUS at 19:59

## 2023-03-21 RX ADMIN — PHENYLEPHRINE HYDROCHLORIDE 200 MCG: 10 INJECTION INTRAVENOUS at 19:38

## 2023-03-21 RX ADMIN — FENTANYL CITRATE 10 MCG: 50 INJECTION, SOLUTION INTRAMUSCULAR; INTRAVENOUS at 19:50

## 2023-03-21 RX ADMIN — HYDROMORPHONE HYDROCHLORIDE 0.2 MG: 0.2 INJECTION, SOLUTION INTRAMUSCULAR; INTRAVENOUS; SUBCUTANEOUS at 10:35

## 2023-03-21 RX ADMIN — Medication 30 MG: at 19:22

## 2023-03-21 RX ADMIN — Medication 4 MCG: at 19:23

## 2023-03-21 RX ADMIN — DEXTROSE AND SODIUM CHLORIDE: 5; 900 INJECTION, SOLUTION INTRAVENOUS at 23:46

## 2023-03-21 RX ADMIN — FENTANYL CITRATE 40 MCG: 50 INJECTION, SOLUTION INTRAMUSCULAR; INTRAVENOUS at 19:22

## 2023-03-21 RX ADMIN — SODIUM CHLORIDE, POTASSIUM CHLORIDE, SODIUM LACTATE AND CALCIUM CHLORIDE: 600; 310; 30; 20 INJECTION, SOLUTION INTRAVENOUS at 19:22

## 2023-03-21 RX ADMIN — PHENYLEPHRINE HYDROCHLORIDE 100 MCG: 10 INJECTION INTRAVENOUS at 19:31

## 2023-03-21 ASSESSMENT — ACTIVITIES OF DAILY LIVING (ADL)
DRESS: 1-->ASSISTANCE (EQUIPMENT/PERSON) NEEDED
ADLS_ACUITY_SCORE: 66
TOILETING: 1-->ASSISTANCE (EQUIPMENT/PERSON) NEEDED (NOT DEVELOPMENTALLY APPROPRIATE)
WALKING_OR_CLIMBING_STAIRS_DIFFICULTY: YES
EATING: 0-->INDEPENDENT
ADLS_ACUITY_SCORE: 66
DRESS: 1-->ASSISTANCE (EQUIPMENT/PERSON) NEEDED (NOT DEVELOPMENTALLY APPROPRIATE)
ADLS_ACUITY_SCORE: 66
ADLS_ACUITY_SCORE: 58
WEAR_GLASSES_OR_BLIND: YES
ADLS_ACUITY_SCORE: 66
FALL_HISTORY_WITHIN_LAST_SIX_MONTHS: YES
DOING_ERRANDS_INDEPENDENTLY_DIFFICULTY: YES
TRANSFERRING: 2-->COMPLETELY DEPENDENT (NOT DEVELOPMENTALLY APPROPRIATE)
ADLS_ACUITY_SCORE: 58
ADLS_ACUITY_SCORE: 58
EATING: 0-->ASSISTANCE NEEDED (DEVELOPMENTALLY APPROPRIATE)
NUMBER_OF_TIMES_PATIENT_HAS_FALLEN_WITHIN_LAST_SIX_MONTHS: -2040
SWALLOWING: 2-->DIFFICULTY SWALLOWING LIQUIDS
ADLS_ACUITY_SCORE: 66
TOILETING_ISSUES: YES
BATHING: 2-->COMPLETELY DEPENDENT (NOT DEVELOPMENTALLY APPROPRIATE)
SWALLOWING: 2-->DIFFICULTY SWALLOWING FOODS
DRESSING/BATHING: BATHING DIFFICULTY, DEPENDENT;DRESSING DIFFICULTY, DEPENDENT
EATING/SWALLOWING: EATING;SWALLOWING LIQUIDS;SWALLOWING SOLID FOOD
TOILETING_ASSISTANCE: TOILETING DIFFICULTY, DEPENDENT;TOILETING DIFFICULTY, REQUIRES EQUIPMENT
DRESSING/BATHING_DIFFICULTY: YES
CHANGE_IN_FUNCTIONAL_STATUS_SINCE_ONSET_OF_CURRENT_ILLNESS/INJURY: NO
ADLS_ACUITY_SCORE: 58
TRANSFERRING: 2-->COMPLETELY DEPENDENT
TOILETING: 1-->ASSISTANCE (EQUIPMENT/PERSON) NEEDED
DEPENDENT_IADLS:: CLEANING;COOKING;LAUNDRY;SHOPPING;MEAL PREPARATION;MEDICATION MANAGEMENT;MONEY MANAGEMENT;TRANSPORTATION;INCONTINENCE
DIFFICULTY_EATING/SWALLOWING: YES
CONCENTRATING,_REMEMBERING_OR_MAKING_DECISIONS_DIFFICULTY: YES
ADLS_ACUITY_SCORE: 39

## 2023-03-21 NOTE — PROGRESS NOTES
[facility]   Tertiary Survey Progress Note     Date of Service (when I saw the patient): 03/21/2023     Assessment & Plan  Trauma mechanism: Ground level fall  Time/date of injury: 2:00 PM 3/20/22  Known Injuries:  Left displaced femoral neck fracture  Other diagnoses:   Urosepsis    HPI  Young J Oh is an 88 year old Divehi speaking female with a PMH of a CVA with left sided hemiplegia on Plavix, prior UTI's, DM2, who was found down by her family. She fell off the commode. Found to have a left femoral neck fracture on imaging. Other pertinent: lactic 4.9 and tachycardia. Volume resuscitatedand started on broad spectrum antibiotics for Urosepsis with improvement. Planned surgical fixation by Orthopedic surgery today.    Neuro/Pain:  # Acute pain  Scheduled Tylenol  Regional Anesthesia placed left iliac fascia block  PRN Hydromorphone or Oxycodone for breakthrough pain    CT head negative for acute pathology  # Hx CVA 2011 with left residual hemiparesis  On Plavix PTA, held in anticipation of OR   - Maintain circadian rhythm.  Lights on during the day.  Off at night, minimize cares at night.  OOB during the day.    Pulmonary:  CT CAP notable for right upper lobe spiculated mass, this needs follow up post op or outpatient   Stable on room air, no productive cough or fevers reported  - Supplemental oxygen to keep saturation above 92 %.  - Aggressive pulmonary hygiene. Incentive spirometer while awake     Cardiovascular:    # Hyperlipidemia  # Essential HTN  PTA meds: Amlodipine , Atorvastatin - Resume  Demand ischemia with elevated troponin 2/2 urosepsis  Trops peaked @ 127, TTE obtained:     GI/Nutrition:    NPO pending OR    Renal/ Fluids/Electrolytes:  UTI with elevated lactic of 4.9, improved with IVF-> 1.1  Modi in place, UOP adequate  - electrolyte replacement protocol  In place.     Endocrine:   # Diabetes Mellitus   - PTA medications: Metformin + Glipzide   Hold  for now, novolog sliding scale interim, given hypoglycemia switched MIVF to D5NS  Monitor blood glucose levels closely, on flouroquinolone antibiotic  Hypoglycemia protocol ordered, for hypoglyecmia    Infectious disease:   #Urosepsis  Hx multiple UTI's  UA: pyuria, +nitrates, lactic 4.9, Cultures pending  On PO Levaquin, transition to a cephalosporin, listed PCN allergy, received a dose of Cefepime @2100 so far tolerating, transition to Ceftriaxone    Hematology:    # Acute blood loss anemia  # Platelet dysfunction 2/2 antiplatelet therapy  - Admitted with Hb of 11.4 low from prior of 13  Known long bone fracture, some dilutional component. Left hip compartments soft with palpable distal pulses  Trend H and H post op  - Clopidogrel for CVA on hold pending surgical plan  - Threshold for transfusion if hgb <7.0 or signs/symptoms of hypoperfusion.       Musculoskeletal:  #Left femoral neck fracture  Orthopedic surgical fixation pending  Bedrest, NWB LLE  - Physical and occupational therapy consults, post op    Lines/ tubes/ drains:  - PIV, sy  Code status: Full code  Son and daughter in law at bedside and updated, anticipated [facility] [place] [facility] [place] with Dr. Paulino  General Cares:    PPI/H2 blocker:  n/a   DVT prophylaxis: mechanical    Bowel Regimen/Date of last stool: PTA   Pulmonary toilet: IS   ETOH screen completed: yes   Lines / drains: Sy cath remains 2nd to immobility and need for strict I&O  Expected D/C date:TBD pending post op course    Interval History   Left hip pain, denies shortness of breath    Review Of Systems  Skin: negative  Eyes: negative  Ears/Nose/Throat: negative  Respiratory: No shortness of breath, dyspnea on exertion, cough, or hemoptysis  Cardiovascular: negative  Gastrointestinal: negative  Genitourinary: urinary tract infection  Musculoskeletal:  injury to left hip  Neurologic: stroke  Psychiatric: negative  Hematologic/Lymphatic/Immunologic: negative  Endocrine:  diabetes    Physical Exam     Sidney Coma Scale - Total 14-15/15  Eye Response (E): 4   4= spontaneous, 3= to verbal/voice, 2= to pain, 1= No response   Verbal Response (V): 4   5= Orientated, converses, 4= Confused, converses, 3= Inappropriate words, 2= Incomprehensible sounds, 1=No response   Motor Response (M): 6   6= Obeys commands, 5= Localizes to pain, 4= Withdrawal to pain, 3=Fexion to pain, 2= Extension to pain, 1= No response     Frailty Questionnaire: To be done for all patients age 60+. To be completed on admission or with the tertiary exam  F (Fatigue): Is the patient easily fatigued? YES = 1  R (Resistance): Is the patient unable to walk one flight of stairs? YES = 1  A (Ambulation): Is the patient unable to walk one block? YES = 1  I  (Illness): Does the patient have more than five illnesses? NO = 0  L (Loss of weight): Has the patient lost more than 5% of weight in the past 6 months. NO = 0  Lost five pounds or more in the last 3 months without trying? AND/OR Unintended weight loss?  Does the patient have difficulty performing housework such as washing windows or scrubbing floors? AND Activity in a typical 24-hour day- No moderate or vigorous activity    Score: 3    Score:3-5: PLAN: Palliative Care Consult, PT/OT Consult, consider PM&R, Delirium Prevention Strategies                           Physical Exam  Vitals reviewed.   HENT:      Head: Normocephalic.      Mouth/Throat:      Mouth: Mucous membranes are dry.   Eyes:      Pupils: Pupils are equal, round, and reactive to light.   Cardiovascular:      Rate and Rhythm: Normal rate and regular rhythm.      Heart sounds: Murmur heard.   Abdominal:      General: Abdomen is flat.      Palpations: Abdomen is soft.   Genitourinary:     Comments: Modi in place, yellow urine  Musculoskeletal:         General: Tenderness and signs of injury present.      Cervical back: Normal range of motion.   Skin:     General: Skin is warm and dry.      Capillary Refill:  Capillary refill takes less than 2 seconds.      Comments: Left femur fracture  LLE shortened and externally rotated   Neurological:      Mental Status: She is alert.      Comments: Left hemiplegia   Psychiatric:         Mood and Affect: Mood normal.       Temp: 97.4  F (36.3  C) Temp src: Axillary BP: 127/71 Pulse: 78   Resp: 11 SpO2: 99 % O2 Device: Nasal cannula Oxygen Delivery: 2 LPM  Vitals:    03/20/23 2127 03/21/23 0200   Weight: 40.8 kg (90 lb) 40.8 kg (89 lb 15.2 oz)     Vital Signs with Ranges  Temp:  [97.4  F (36.3  C)-99.4  F (37.4  C)] 97.4  F (36.3  C)  Pulse:  [] 78  Resp:  [10-16] 11  BP: (119-168)/(71-94) 127/71  SpO2:  [93 %-99 %] 99 %  I/O last 3 completed shifts:  In: 1155 [I.V.:155; IV Piggyback:1000]  Out: 850 [Urine:850]      SHELL Smith CNP  To contact the trauma service use job code pager [phone],   Numeric texts or alpha text through [facility]

## 2023-03-21 NOTE — CONSULTS
SURGICAL ICU ADMISSION NOTE  3/21/2023    PRIMARY TEAM: Trauma  PRIMARY PHYSICIAN: Dr. Page    REASON FOR CRITICAL CARE ADMISSION: Fall, fracture, possible sepsis  CONSULTING PHYSICIAN: Dr. Monahan    ASSESSMENT: This is an 88-year-old female with a PMHx of Stroke with left sided wekaness on Plavix, type 2 diabetes on insulin,  had a mechanical fall resulting in fracture of left femur also with a UTI with suspicion for possible early sepsis, getting antibiotics, admitted to the ICU for further monitoring and management         PLAN:   Neuro/ pain/ sedation:  -Monitor neurological status. Notify the MD for any acute changes in exam.  -As needed Dilaudid, oxy, Tylenol for pain.     Pulmonary care:   -Supplemental oxygen to keep saturation above 92 %.  - Incentive spirometer every 15- 30 minutes when awake.     Cardiovascular:    -Monitor hemodynamic status.        GI care:   -NPO except ice chips and medications.  - Possible OR in the AM, keep NPO     Fluids/ Electrolytes/ Nutrition:   -LR bolus for IV fluid hydration    -ICU electrolyte replacement protocol  -No indication for parenteral nutrition.       Renal/ Fluid Balance:    -Urine output is adequate so far, patient appears dehydrated, elevated lactic acid, will trend  -Will continue to monitor intake and output.       Endocrine:    #History of type 2 diabetes, on insulin  -Sliding scale for diabetes management.        ID/ Antibiotics:  # UTI, concern for Sepsis  -  Patient on Levaquin due to itching with penicillin/Vanco  - MRSA swab sent     Heme:     -Hemoglobin stable.       Prophylaxis:    -Mechanical prophylaxis for DVT.   -No chemical DVT prophylaxis due to high risk of bleeding.        MSK:    # Femoral neck fracture  -Ortho consulted, planing on OR for left hip hemiarthroplasty  - Nonweightbearing to the LLE  -PT and OT consulted. Appreciate recs.       Lines/ tubes/ drains:  -PIV     Disposition:  -Surgical ICU.     Patient seen, findings and plan  "discussed with surgical ICU staff.    Harpreet Cox MD  Surgery PGY2    - - - - - - - - - - - - - - - - - - - - - - - - - - - - - - - - - - - - - - - - - - - - - - - - - - - - - - - - - - - - - - - - - - - - - - - -     HISTORY PRESENTING ILLNESS: This is an 88-year-old female with a history of stroke with left-sided deficits weakness, type 2 diabetes on insulin, had a mechanical fall while getting up from the commode sustained a fracture of the left hip.  Patient needed help getting up.  History provided by family at bedside due to language barrier.  Patient denies hitting her head, can remember the whole event.  Currently only complains of pain in the left hip.  No fevers or chills, no shortness of breath, no chest pain.  Regular bowel movements.  Patient is a full code      REVIEW OF SYSTEMS: 10 point ROS neg other than the symptoms noted above in the HPI.      PAST MEDICAL HISTORY:   CVA  Type 2 diabetes    SURGICAL HISTORY:    has a past surgical history that includes orthopedic surgery and back surgery.     SOCIAL HISTORY:    reports that she has never smoked. She has never used smokeless tobacco. She reports that she does not currently use alcohol. She reports that she does not use drugs.    FAMILY HISTORY: No bleeding/clotting disorders nor problems with anesthesia.     ALLERGIES:      Allergies   Allergen Reactions     Aspirin Unknown     Irbesartan Unknown     Penicillins Other (See Comments)     Pt has some sort of reaction at dentist office after PEN supposedly. Pt cannot verify nor can family. Pt has no reaction to \"stephane\" products as was previously indicated. This was verified by familly. All this reviewed with opthalmalogist and anestheiologist on 5/17/2018       MEDICATIONS:  No current facility-administered medications on file prior to encounter.  amLODIPine (NORVASC) 2.5 MG tablet, Take 1 tablet by mouth daily  atorvastatin (LIPITOR) 20 MG tablet, Take 1 tablet by mouth daily  betamethasone " valerate (VALISONE) 0.1 % external cream, APPLY TO AFFECTED AREA TWICE A DAY  calcium carbonate 600 mg-vitamin D 400 units (CALCIUM CARB-CHOLECALCIFEROL) 600-400 MG-UNIT per tablet, Take 1 tablet by mouth  ciprofloxacin (CIPRO) 500 MG tablet, Take 1 tablet (500 mg) by mouth 2 times daily  ciprofloxacin (CIPRO) 500 MG tablet, Take 1 tablet by mouth every 12 hours.  clopidogrel (PLAVIX) 75 MG tablet, Take 75 mg by mouth daily  fish oil-omega-3 fatty acids 1000 MG capsule, Take 1 capsule by mouth 2 times daily.  fluocinonide (LIDEX) 0.05 % external ointment,   glipiZIDE (GLUCOTROL) 5 MG tablet, Take 1 tablet by mouth daily.  hydrOXYzine (ATARAX) 10 MG tablet, TAKE 1 TABLET BY MOUTH TWICE A DAY AS NEEDED FOR ITCHING. MAY CAUSE SLEEPINESS  hypromellose-dextran (ARTIFICAL TEARS) 0.1-0.3 % ophthalmic solution, 1-2 drops  LANTUS SOLOSTAR 100 UNIT/ML soln, INJECT 40 UNITS IN MORNING AND 32 UNITS IN EVENING  metformin (GLUCOPHAGE) 1000 MG tablet, Take 1 tablet by mouth every 12 hours.  mirtazapine (REMERON) 7.5 MG tablet, TAKE 1 TABLET BY MOUTH EVERYDAY AT BEDTIME  oxybutynin (DITROPAN-XL) 10 MG 24 hr tablet, Take 1 tablet by mouth daily.  simvastatin (ZOCOR) 20 MG tablet, Take 1 tablet by mouth At Bedtime.        PHYSICAL EXAMINATION:  Temp:  [98  F (36.7  C)-99.4  F (37.4  C)] 99.4  F (37.4  C)  Pulse:  [] 100  Resp:  [15-16] 15  BP: (148-168)/(72-90) 152/72  SpO2:  [96 %-98 %] 98 %      Gen: no acute distress, resting comfortably in bed  HEENT: Atraumatic, normocephalic  Neuro: Alert and oriented.  Left-sided weakness, difficult to examine left lower extremity due to pain with movement  Pulm: nonlabored breathing on room air, no cough  CV: RRR by radial pulse, noncyanotic   ABD:soft, nontender , nondistended  MSK: Pain to palpation of left hip, full range of motion bilateral upper extremities and right lower extremity.  Extremities all well perfused with good pulses bilaterally sensation intact bilaterally.   Skin:  Warm, dry, no rashes or abrasions on exposed skin  Psych: Normal affect, cooperative      LABS: Reviewed.   Arterial Blood Gases   No lab results found in last 7 days.  Complete Blood Count   Recent Labs   Lab 03/20/23  1652   WBC 10.2   HGB 11.4*        Basic Metabolic Panel  Recent Labs   Lab 03/21/23  0054 03/20/23  2225 03/20/23  2047 03/20/23  1652   NA  --   --   --  140   POTASSIUM  --   --   --  4.0   CHLORIDE  --   --   --  107   CO2  --   --   --  20*   BUN  --   --   --  17.2   CR  --   --   --  0.78   * 92 77 192*     Liver Function Tests  Recent Labs   Lab 03/20/23  1652   AST 19   ALT 7*   ALKPHOS 43   BILITOTAL 0.4   ALBUMIN 3.5   INR 1.06     Pancreatic Enzymes  Recent Labs   Lab 03/20/23  1652   LIPASE 12*     Coagulation Profile  Recent Labs   Lab 03/20/23  1652   INR 1.06     Lactate  Invalid input(s): LACTATE    IMAGING:  Recent Results (from the past 24 hour(s))   XR Pelvis w Hip Left 1 View    Narrative    EXAM: XR PELVIS AND HIP LEFT 1 VIEW  LOCATION: [facility]  DATE/TIME: 3/20/2023 5:39 PM    INDICATION: left hip leg pain  COMPARISON: CT abdomen pelvis 06/03/2022      Impression    IMPRESSION: Acute fracture of the left femoral neck with mild displacement and varus angulation. The margins of the fracture are ill-defined and an underlying lytic lesion is possible but not considered likely. CT could assess further.    There is diffuse bony demineralisation. Mild degenerative arthritis left hip joint. Sclerotic lesions involving the pubic bones are stable. Postoperative and degenerative changes in lumbar spine.   Cervical spine CT w/o contrast    Narrative    EXAM: CT CERVICAL SPINE W/O CONTRAST  3/20/2023 8:59 PM     HISTORY:  fall, > 66 yo, can't move left leg well, fall, > 66 yo,  can't move left leg well       COMPARISON:  None.    TECHNIQUE: Using multidetector thin collimation helical acquisition  technique, axial, coronal and  sagittal CT images through the cervical  spine were obtained without intravenous contrast.    FINDINGS:  Mild leftward convexity curvature. Reversal of normal cervical  lordosis. Normal vertebral body alignment. No acute fracture or  traumatic subluxation. Mild disc space loss at C4-5. Mild loss of disc  height at C5-6 and C6-7. No prevertebral edema. Multilevel  degenerative changes including vertebral endplate irregularity, vacuum  phenomenon, and endplate osteophytic spurring.    The findings on a level by level basis are as follows:    C2-3:  Left facet hypertrophy. No spinal canal or neural foraminal  stenosis.    C3-4:  Posterior disc bulge and superimposed central disc herniation.  Moderate spinal canal narrowing. No neural foraminal narrowing.    C4-5:  Posterior disc osteophyte complex. Right greater than left  uncinate hypertrophy. Mild spinal canal narrowing. Mild right neural  foraminal narrowing. No left neural foraminal stenosis.    C5-6:  Disc osteophyte complex. No spinal canal narrowing. No  neuroforaminal narrowing.    C6-7:  Disc osteophyte complex. No spinal canal or neural foraminal  stenosis.    C7-T1:  No spinal canal or neural foraminal stenosis.     No abnormality of the paraspinous soft tissues. Please see same-day CT  CAP for body findings, including findings in the lungs.      Impression    IMPRESSION:  1. No acute fracture or traumatic subluxation.  2. Cervical spondylosis without high-grade spinal canal or neural  foraminal stenosis.    I have personally reviewed the examination and initial interpretation  and I agree with the findings.    YENNIFER BUSTILLO MD         SYSTEM ID:  [id number]   Head CT w/o contrast    Narrative    EXAM: CT HEAD W/O CONTRAST  3/20/2023 9:00 PM     HISTORY:  fall, cant move left leg well Can't tell if pain or strength  issue       COMPARISON:  Head CT 1/1/2013    TECHNIQUE: Using multidetector thin collimation helical acquisition  technique, axial, coronal and  sagittal CT images from the skull base  to the vertex were obtained without intravenous contrast.   (topogram) image(s) also obtained and reviewed.    FINDINGS:  No acute intracranial hemorrhage, mass effect, or midline shift. No  acute loss of gray-white matter differentiation in the cerebral  hemispheres. Ventricles are proportionate to the cerebral sulci. Clear  basal cisterns. Confluent T2 periventricular hypodensity which may  relate to chronic small vessel ischemic disease. Old right corona  radiata infarct, unchanged. Cerebral and cerebellar volume loss. Basal  ganglia calcifications.    The bony calvaria and the bones of the skull base are normal.  Incidental 6 mm left ethmoid osteoma, unchanged. Mild left mastoid  effusion, also unchanged. Grossly normal orbits.       Impression    IMPRESSION:   1. No acute intracranial pathology.  2. Unchanged old right corona radiata infarct.    I have personally reviewed the examination and initial interpretation  and I agree with the findings.    YENNIFER BUSTILLO MD         SYSTEM ID:  [id number]   Lumbar spine CT w/o contrast    Narrative    EXAM: CT LUMBAR SPINE W/O CONTRAST  3/20/2023 9:13 PM     HISTORY:  RECON from CT A/P - fall, pain       COMPARISON:  Same day CT CAP    TECHNIQUE: Using multidetector thin collimation helical acquisition  technique, axial, sagittal and coronal 3 mm thickness CT  reconstructions were obtained through the lumbar spine without  intravenous contrast.  Images were viewed in bone and soft tissue  windows.    FINDINGS:  5 lumbar-type vertebrae are verified by the same day CT CAP. L4-5  posterior lumbar fixation hardware with ankylosis of the posterior  elements. No evidence for hardware failure. Grade 1 anterolisthesis of  L4 and L5, unchanged. No significant disc space narrowing. No acute  fracture. No suspicious osseous lesion. Multilevel lumbar degenerative  changes including anterior osteophytic spurring and Schmorl's  nodes.  Osteopenic-appearing bones.    Findings on a level by level basis are as follows:    L1-L2: No spinal canal or neural foraminal stenosis    L2-L3: Circumferential disc bulge. Bilateral facet hypertrophy.  Mild-moderate left neural foraminal narrowing. Mild right  neuroforaminal narrowing. Mild spinal canal narrowing.    L3-L4: Circumferential disc bulge. Ligamentum flavum thickening and  bilateral facet hypertrophy. Mild to moderate bilateral neural  foraminal narrowing. Mild spinal canal narrowing.    L4-L5: Grade 1 anterolisthesis. Posterior fusion instrumentation and  laminectomy changes. Posterior disc bulge and bilateral facet  hypertrophy. Mild bilateral neural foraminal narrowing. Mild spinal  canal narrowing.    L5-S1: Circumferential disc bulge and bilateral facet hypertrophy.  Laminectomy changes. Mild spinal canal narrowing. Moderate right and  mild left neural foraminal narrowing.    Diffuse atrophy of the erector spinae musculature, particularly at and  below the posterior fusion levels.    Bilateral renal cysts.      Impression    IMPRESSION:  1. No acute fracture or traumatic subluxation.  2. L4-5 fusion instrumentation without evidence for hardware failure.  3. Lumbar degenerative changes without high-grade spinal canal  stenosis.    I have personally reviewed the examination and initial interpretation  and I agree with the findings.    YENNIFER BUSTILLO MD         SYSTEM ID:  [id number]   CT Chest/Abdomen/Pelvis w Contrast    Narrative    EXAMINATION: CT CHEST/ABDOMEN/PELVIS W CONTRAST, 3/20/2023 9:14 PM    TECHNIQUE:  Helical CT images from the thoracic inlet through the  symphysis pubis were obtained  with contrast. Contrast dose: iopamidol  ([id number]-370) solution 55 mL    COMPARISON: 6/3/2022, 3/14/2010    HISTORY: fall, low chest/abdominal pain, left hip/leg pain    FINDINGS:    Chest: Subcentimeter hypoattenuating nodule within the inferior left  thyroid lobe. No suspicious base of the neck  or axillary  lymphadenopathy. Nonspecific hyperattenuating periesophageal region,  favored to represent adjacent lymph node (series 3 image 72).  Additional prominent mediastinal and perihilar lymph nodes such as 2.2  x 1.2 centimeter subcarinal lymph node (series 3 image 69), some of  which are at least partially calcified. Normal caliber of the major  thoracic vessels. No definite evidence of pulmonary embolism in this  nondedicated study. Conventional branching pattern of the aortic arch.  Normal cardiac size. 2.1 x 2.0 x 3.1 cm spiculated right upper lobe  lung mass with areas of calcification superiorly. On 3/14/2020 a  groundglass nodule could be visualized in the same area. Areas of  calcification were present previously and appears grossly stable.  Consolidation of the right middle lobe, likely fibrosis from prior  abscess. Partially calcified cavitary lesion in the left upper lobe  measuring 8 x 6 mm (series 6 image 32).    Liver: No mass. No intrahepatic biliary ductal dilation.    Biliary System: Normal gallbladder. No extrahepatic biliary ductal  dilation.    Pancreas: No pancreatic ductal dilation. 1.3 x 1.2 cm cystic lesion in  the inferior aspect of the uncinate process of the pancreatic head,  unchanged from prior CT. No calcifications or internal masses. Wall is  prominent but unchanged.    Adrenal glands: No mass or nodules    Spleen: Normal.    Kidneys: No suspicious mass, obstructing calculus or hydronephrosis.   Simple renal cysts.    Gastrointestinal tract :Normal appendix. Normal caliber small bowel.   Duodenal diverticula in the terminal descending duodenum (series 7  image 160)    Mesentery/peritoneum/retroperitoneum: No mass. No free fluid or air.    Lymph nodes: No significant lymphadenopathy.    Vasculature: Patent major abdominal vasculature.    Pelvis: Bladder wall thickening with surrounding fat stranding in the  left bladder wall. Atrophic uterus.      Soft tissues: Sacral pressure  ulcer. Asymmetric atrophy of the left  shoulder and visualized arm muscles.    Osseous structures: Displaced fracture of the left femoral neck with  mild valgus angulation.. Diffuse osteopenia. Although not excluded  this does not appear to represent a pathologic fracture. Sclerotic  lesion in the right pubic bone, unchanged from 6/3/2022. Unchanged  posterior fixation hardware of L4-L5. No additional fractures are  visualized. Diffusely osteopenic bones      Impression    IMPRESSION:   1. Right upper lobe spiculated mass. On review of prior from 2010  there was a groundglass nodule in the same region which could indicate  a slow growing adenocarcinoma. Differential include infection and  scarring from prior infection.  2. Unchanged calcified nodules at the left apex, likely old  granulomatous disease.  3. Calcified and noncalcified prominent mediastinal lymph nodes. These  are possibly secondary to prior granulomatous disease although  superimposed malignant lymph nodes cannot be excluded.  4.. Visualized left femoral neck fracture with diffuse osteopenia.  Pathologic fracture not excluded given the findings in impression #1.  No additional suspicious osseous lesions are visualized.  5. Unchanged appearance of the wall thickening with faint surrounding  fat stranding, suggestive of cystitis. Correlate with urinalysis.  6. 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 Stable since prior CT of the abdomen dictated  6/3/2022.     XR Femur Left 2 Views    Narrative    EXAM: XR FEMUR LEFT 2 VIEWS  LOCATION: [facility]  DATE/TIME: 3/20/2023 10:17 PM    INDICATION: full length femur for ortho  COMPARISON: Left hip 03/20/2023 at 5:26 PM      Impression    IMPRESSION: Left femoral neck fracture again identified. No distal femur fracture or other acute abnormality.

## 2023-03-21 NOTE — CONSULTS
Orthopaedic Surgery Consultation/H&P     DATE OF SERVICE: 3/20/2023      This is a consultation requested by ED for left hip fracture.     CHIEF COMPLAINT: left hip pain     HISTORY OBTAINED FROM: chart review, patient family, patient -- patient is U[place] speaking and interpretation was provided by family who is in the room with the patient     HISTORY: This is a 88 year old old female with PMH stroke (1/2011, residual symptoms of hemiparesis of the left side upper and lower, chronic anticoagulation with Plavix), DM2 who presents with left hip pain after a ground level fall today. The patient was sitting on a commode in her apartment where she was accompanied by her son and daughter in law who PCA for her when she leaned forward to far and fell off. Landed on her left side. She was helped up and to bed and then was complaining of left hip pain. The patient was unable to bear weight after the fall due to pain. Denies numbness, paresthesias, head trauma, LOC, pain anywhere else in the body. Denies history of injury or surgery to the left lower extremity although she does have significant weakness on the left leg subsequent to a stroke in 2011. Denies antecedent pain in the left hip.     Patient lives in an apartment with her son and daughter in law. Due to her CVA history and resultant hemiparesis she is largely bedbound though she does stand with assistance to transfer to a wheelchair or to a commode. She will sit upright for things like meals and other ADLs. She does not do any significant ambulation.       PAST MEDICAL HISTORY:   Past Medical History:   Diagnosis Date     Diabetes mellitus (H)      Unspecified cerebral artery occlusion with cerebral infarction         PAST SURGICAL HISTORY:   Past Surgical History:   Procedure Laterality Date     BACK SURGERY       ORTHOPEDIC SURGERY            FAMILY HISTORY: Reviewed with patient and is non-contributory.   No known personal or family history of bleeding or  "clotting disorders -- personal history of stroke  No known personal or family history of adverse reactions to anesthesia      SOCIAL HISTORY:   Tobacco - nonsmoker  Alcohol - no daily alcohol consumption  Social History     Tobacco Use     Smoking status: Never     Smokeless tobacco: Never   Substance Use Topics     Alcohol use: Not Currently     Drug use: Never          ALLERGIES:   Allergies   Allergen Reactions     Aspirin Unknown     Irbesartan Unknown     Penicillins Other (See Comments)     Pt has some sort of reaction at dentist office after PEN supposedly. Pt cannot verify nor can family. Pt has no reaction to \"stephane\" products as was previously indicated. This was verified by familly. All this reviewed with opthalmalogist and anestheiologist on 5/17/2018        MEDS:   Blood Thinners: plavix  Prior to Admission medications    Medication Sig Last Dose Taking? Auth Provider Long Term End Date   amLODIPine (NORVASC) 2.5 MG tablet Take 1 tablet by mouth daily   Reported, Patient Yes    atorvastatin (LIPITOR) 20 MG tablet Take 1 tablet by mouth daily   Reported, Patient Yes    betamethasone valerate (VALISONE) 0.1 % external cream APPLY TO AFFECTED AREA TWICE A DAY   Reported, Patient     calcium carbonate 600 mg-vitamin D 400 units (CALCIUM CARB-CHOLECALCIFEROL) 600-400 MG-UNIT per tablet Take 1 tablet by mouth   Reported, Patient     ciprofloxacin (CIPRO) 500 MG tablet Take 1 tablet (500 mg) by mouth 2 times daily   José Baez MD     ciprofloxacin (CIPRO) 500 MG tablet Take 1 tablet by mouth every 12 hours.   Reported, Patient     clopidogrel (PLAVIX) 75 MG tablet Take 75 mg by mouth daily   Reported, Patient Yes    fish oil-omega-3 fatty acids 1000 MG capsule Take 1 capsule by mouth 2 times daily.   Reported, Patient     fluocinonide (LIDEX) 0.05 % external ointment    Reported, Patient     glipiZIDE (GLUCOTROL) 5 MG tablet Take 1 tablet by mouth daily.   Reported, Patient     hydrOXYzine (ATARAX) 10 MG " tablet TAKE 1 TABLET BY MOUTH TWICE A DAY AS NEEDED FOR ITCHING. MAY CAUSE SLEEPINESS   Reported, Patient     hypromellose-dextran (ARTIFICAL TEARS) 0.1-0.3 % ophthalmic solution 1-2 drops   Reported, Patient     LANTUS SOLOSTAR 100 UNIT/ML soln INJECT 40 UNITS IN MORNING AND 32 UNITS IN EVENING   Reported, Patient Yes    metformin (GLUCOPHAGE) 1000 MG tablet Take 1 tablet by mouth every 12 hours.   Reported, Patient     mirtazapine (REMERON) 7.5 MG tablet TAKE 1 TABLET BY MOUTH EVERYDAY AT BEDTIME   Reported, Patient Yes    oxybutynin (DITROPAN-XL) 10 MG 24 hr tablet Take 1 tablet by mouth daily.   Reported, Patient     simvastatin (ZOCOR) 20 MG tablet Take 1 tablet by mouth At Bedtime.   Reported, Patient             REVIEW OF SYSTEMS: An 11-point systems review was performed and negative except as noted in the HPI.        PHYSICAL EXAMINATION:   Vitals:    03/20/23 1605 03/20/23 1640 03/20/23 2102   BP: (!) 168/90 (!) 165/74 (!) 157/76   BP Location: Right arm Right arm Right arm   Pulse: 92 92 98   Resp: 15 15 16   Temp: 98  F (36.7  C)     TempSrc: Oral     SpO2: 98%  96%        Gen: Resting quietly, appears in pain. Very gaunt and frail appearing elderly woman.  HEENT: Normal and atraumatic   Pulm: Non-labored breathing at rest.  CV: RRR   Extremities:     RUE: No deformity, skin intact.   Non-tender to palpation over clavicle, shoulder, arm, elbow, forearm, wrist.   Normal ROM shoulder, elbow, wrist without pain. + FPL/EPL/intrinsics.   SILT over m/r/u distributions.   Radial pulse palpable.      LUE: No deformity, skin intact.   Non-tender to palpation over clavicle, shoulder, arm, elbow, forearm, wrist.   Normal ROM shoulder, elbow, wrist without pain through tone increased. FPL, EPL, and intrinsics are very weak (patient baseline).   SILT over m/r/u distributions grossly.   Radial pulse palpable.      LLE:   Hip held in flexion and ER, skin intact.   TTP over hip/lateral thigh. Non-tender to palpation over  knee, leg, ankle/foot.   Unable to perform ROM at hip due to pain. Pain with log roll and simulated WB. No pain with ROM knee/ankle.   + TA/Gsc/EHL/FHL.   SILT DP/SP/sural/saph/tibial distributions grossly.   DP/PT palpable, toes warm/well-perfused.      RLE: No deformity, skin intact.   Non-tender to palpation over thigh, knee, leg, ankle/foot.   No pain with ROM hip/knee/ankle.   + TA/Gsc/EHL/FHL.   SILT DP/SP/sural/saph/tibial distributions.   DP/PT palpable, toes warm/well-perfused.      LABS/IMAGING:  CBC RESULTS:   Recent Labs   Lab Test 03/20/23  1652   WBC 10.2   RBC 3.40*   HGB 11.4*   HCT 35.2   *   MCH 33.5*   MCHC 32.4   RDW 14.8        Last Comprehensive Metabolic Panel:  Sodium   Date Value Ref Range Status   03/20/2023 140 136 - 145 mmol/L Final   01/01/2013 139 133 - 144 mmol/L Final     Potassium   Date Value Ref Range Status   03/20/2023 4.0 3.4 - 5.3 mmol/L Final   06/03/2022 4.6 3.4 - 5.3 mmol/L Final   01/01/2013 4.2 3.4 - 5.3 mmol/L Final     Comment:     Specimen slightly hemolyzed, potassium may be falsely elevated     Chloride   Date Value Ref Range Status   03/20/2023 107 98 - 107 mmol/L Final   06/03/2022 106 94 - 109 mmol/L Final   01/01/2013 100 94 - 109 mmol/L Final     Carbon Dioxide   Date Value Ref Range Status   01/01/2013 24 20 - 32 mmol/L Final     Carbon Dioxide (CO2)   Date Value Ref Range Status   03/20/2023 20 (L) 22 - 29 mmol/L Final   06/03/2022 26 20 - 32 mmol/L Final     Anion Gap   Date Value Ref Range Status   03/20/2023 13 7 - 15 mmol/L Final   06/03/2022 6 3 - 14 mmol/L Final   01/01/2013 15 6 - 17 mmol/L Corrected     Comment:     CORRECTED ON 01/01 AT 1908: PREVIOUSLY REPORTED AS 15.1     Glucose   Date Value Ref Range Status   03/20/2023 192 (H) 70 - 99 mg/dL Final   06/03/2022 250 (H) 70 - 99 mg/dL Final   01/01/2013 231 (H) 60 - 99 mg/dL Final     GLUCOSE BY METER POCT   Date Value Ref Range Status   03/20/2023 77 70 - 99 mg/dL Final     Urea  Nitrogen   Date Value Ref Range Status   03/20/2023 17.2 8.0 - 23.0 mg/dL Final   06/03/2022 17 7 - 30 mg/dL Final   01/01/2013 23 7 - 30 mg/dL Final     Creatinine   Date Value Ref Range Status   03/20/2023 0.78 0.51 - 0.95 mg/dL Final   01/01/2013 0.80 0.52 - 1.04 mg/dL Final     GFR Estimate   Date Value Ref Range Status   03/20/2023 73 >60 mL/min/1.73m2 Final     Comment:     eGFR calculated using 2021 CKD-EPI equation.   01/01/2013 69 >60 mL/min/1.7m2 Final     Calcium   Date Value Ref Range Status   03/20/2023 9.1 8.8 - 10.2 mg/dL Final   01/01/2013 9.1 8.5 - 10.4 mg/dL Final     INR   Date Value Ref Range Status   03/20/2023 1.06 0.85 - 1.15 Final   08/26/2012 0.95 0.86 - 1.14 Final      CRP Inflammation   Date Value Ref Range Status   03/16/2010 54.3 (H) 0.0 - 8.0 mg/L Final        Imaging:  Review of x-rays shows displaced femoral neck fracture     PROCEDURES:  none     IMPRESSION AND PLAN: A 88 year old with closed displaced left femoral neck fracture. Risks, benefits and alternatives of both operative and closed treatment were discussed with the patient and her family. Questions answered.    This is a femoral neck fracture in a patient with limited mobility at baseline, predominantly secondary to remote stroke and resultant paresis. Despite this, we discussed that nonoperative management typically results in parekh end of life due to complications resulting from complete bedrest and immobility including but not limited to pneumonia, UTI, deconditioning, delirium, etc. Given location and displacement of fracture in low demand octogenarian, this would indicate left hip hemiarthroplasty Operative management would first have a palliative role for pain control and allow bed mobility and sitting upright with better pain tolerance due to addressing fracture. Secondary would allow for at least possible chance of returning to her baseline function which sounds like it entails transfers. The ultimate goal would be  for her to be able to tolerate sitting upright for personal care or things like meals. Discussed risks, benefits, alternatives to proceeding with surgery to include infection, bleeding, and damage to neurovascular structures as well as risks of anesthesia including stroke, MI, and death. The above was discussed with the family and the patient and while they will discuss further tonight are leaning towards proceeding with operative intervention.    The patient is sick at this time with concern for possible sepsis. Presented with elevated lactate, non responsive to IVF. Broad spectrum antibiotics started and infectious work up in process. Will be going to ICU. Will need medical clearance from trauma prior to proceeding with any possible surgery. Will tentatively add on to OR, make other preparations, keep NPO in case she is optimized for surgery.     - Admit to Trauma; ICU status  - Plan for OR: left hip hemiarthroplasty  - Consent: to be obtained  - Pre-op labs: complete  - Medicine/trauma/SICU clearance: pending  - Anticoagulation: hold in anticipation of OR  - Antibiotics/tetanus: per primary, will need perioperative ancef  - Xrays/imaging:  complete  - Activity:  bedrest for now  - Weight Bearing: NWB LLE  - Pain control: per primary service, placed consult to regional anesthesia service for fascial block  - Diet:   NPO in anticipation of OR  - Dispo: ICU  - Follow-up:  TBD     Patient will be discussed with Dr. Paulino, staff.     --  Thomas Celaya MD  Orthopedic Surgery PGY-2  [phone]

## 2023-03-21 NOTE — PHARMACY-VANCOMYCIN DOSING SERVICE
Pharmacy Vancomycin Initial Note  Date of Service 2023  Patient's  1934  88 year old, female    Indication: Sepsis    Current estimated CrCl = Estimated Creatinine Clearance: 32.1 mL/min (based on SCr of 0.78 mg/dL).    Creatinine for last 3 days  3/20/2023:  4:52 PM Creatinine 0.78 mg/dL    Recent Vancomycin Level(s) for last 3 days  No results found for requested labs within last 72 hours.      Vancomycin IV Administrations (past 72 hours)      No vancomycin orders with administrations in past 72 hours.                Nephrotoxins and other renal medications (From now, onward)    Start     Dose/Rate Route Frequency Ordered Stop    23  vancomycin (VANCOCIN) 1000 mg in dextrose 5% 200 mL PREMIX         1,000 mg  200 mL/hr over 1 Hours Intravenous ONCE 23  vancomycin place amin - receiving intermittent dosing         1 each Does not apply SEE ADMIN INSTRUCTIONS 23            Contrast Orders - past 72 hours (72h ago, onward)    Start     Dose/Rate Route Frequency Stop    23 1950  iopamidol (ISOVUE-370) solution 55 mL         55 mL Intravenous ONCE 23              Plan:  1. Start vancomycin  1000 mg IV once followed by intermittent dosing.   2. Vancomycin monitoring method: Trough (Method 2 = manual dose calculation)  3. Vancomycin therapeutic monitoring goal: 15-20 mg/L  4. Pharmacy will check vancomycin levels as appropriate in 1-3 Days.    5. Serum creatinine levels will be ordered daily for the first week of therapy and at least twice weekly for subsequent weeks.      Barby Lockhart RP

## 2023-03-21 NOTE — PROGRESS NOTES
D/I: Patient on unit 4A Surgical/Neuro ICU   Neuro- Alert, confused, Oriented to self only. Can say shes in hospital, but not which one or which city she is in. Baseline L sided deficit.  CV- Sinus to sinus tachycardia. Some mild hypertension (-160's). Afebrile.  Pulm- RA-2L NC  GI- NPO pending OR. Patient's family reports some difficulty swallowing food/liquids. LBM 3/20.  - Sy, good output. Cloudy in appearance.  Gtts-  None  Skin- Community aq, Stg 1 PI on coccyx. Pre-existing upon admission.  Pain- Poorly controled. aggrivated with movement.  Lines and drains- PIV x2, sy.   See flow sheets for further interventions and assessments.   A: Stable   P: Continue to monitor pt closely. Notify MD of significant changesD/I: Patient on unit 4A Surgical/Neuro

## 2023-03-21 NOTE — CONSULTS
"Pain Service Progress Note  [facility]  Date: 03/21/2023       Patient Name: Julio Grier  MRN: [id number]  Age: 88 year old  Sex: female      Assessment:  88 y.o. with L hip fracture presenting for possible OR management. Planning to place L fascia iliaca cathter for postop management.    Procedure: L fascia iliac cathter    Date of Surgery: TBD    Date of Catheter Placement: 3/21/23    Plan/Recommendations:  1. Regional Anesthesia/Analgesia  -Continuous Catheter Type/Site: left fascia iliaca  Infusate: Ropivicaine 2%  Programmed Intermittent Bolus (PIB) at 10 mL Q60 min via each catheter, total infusion rate of 10 mL/hr    Plan to maintain catheter prn, max of 7 days    2. Anticoagulation  -Please contact Inpatient Pain Service before ordering or making any anticoagulation changes       3. Multimodal Analgesia  - per primary team    Pain Service will continue to follow.    Discussed with attending anesthesiologist    Cale Coronel MD  03/21/2023     Overnight Events: None    Tubes/Drains: No    Subjective: \"pain\"  Nausea: No  Vomiting: No  Pruritus: No  Symptoms of LAST: No    Pain Location:  L hip    Pain Intensity:    Pain at Rest: 7/10   Pain with Activity: 7/10  Comfort Goal: 3/10   Baseline Pain: 3/10   Satisfied with your level of pain control: No    Diet: NPO for Medical/Clinical Reasons Except for: Meds    Relevant Labs:  Recent Labs   Lab Test 03/21/23  0848 03/20/23  1652   INR  --  1.06   PLT  --  210   BUN 10.9 17.2       Physical Exam:  Vitals: /67   Pulse 71   Temp 36.7  C (98  F) (Oral)   Resp 20   Ht 1.575 m (5' 2.01\")   Wt 40.8 kg (89 lb 15.2 oz)   SpO2 100%   BMI 16.45 kg/m      Physical Exam:   Orientation:  Alert, oriented, and in no acute distress: Yes  Sedation: No    Motor Examination:  5/5 Strength in lower extremities: exam limited by pain  Sensory Level:   Decrease in sensation: No    Catheter Site:   Catheter entry site is clean/dry/intact: " Yes    Tender: No      Relevant Medications:  Current Pain Medications:  Medications related to Pain Management (From now, onward)    Start     Dose/Rate Route Frequency Ordered Stop    03/21/23 1630  acetaminophen (TYLENOL) tablet 650 mg         650 mg Oral ONCE 03/21/23 1624      03/21/23 1500  [Auto Hold]  acetaminophen (TYLENOL) tablet 975 mg        (Auto Hold since Tue 3/21/2023 at 1600.Hold Reason: Transfer to a procedural area)    975 mg Oral EVERY 8 HOURS 03/21/23 1308      03/21/23 1415  [Auto Hold]  ropivacaine 0.2% in NS perineural infusion (simple)        (Auto Hold since Tue 3/21/2023 at 1600.Hold Reason: Transfer to a procedural area)     Perineural Continuous Nerve Block 03/21/23 1351      03/21/23 1030  [Auto Hold]  polyethylene glycol (MIRALAX) Packet 17 g        (Auto Hold since Tue 3/21/2023 at 1600.Hold Reason: Transfer to a procedural area)    17 g Oral DAILY 03/21/23 1000      03/21/23 0223  [Auto Hold]  methocarbamol (ROBAXIN) tablet 500 mg        (Auto Hold since Tue 3/21/2023 at 1600.Hold Reason: Transfer to a procedural area)    500 mg Oral EVERY 4 HOURS PRN 03/21/23 0223      03/21/23 0202  [Auto Hold]  senna-docusate (SENOKOT-S/PERICOLACE) 8.6-50 MG per tablet 1 tablet        (Auto Hold since Tue 3/21/2023 at 1600.Hold Reason: Transfer to a procedural area)   See Hyperspace for full Linked Orders Report.    1 tablet Oral 2 TIMES DAILY PRN 03/21/23 0204      03/21/23 0202  [Auto Hold]  senna-docusate (SENOKOT-S/PERICOLACE) 8.6-50 MG per tablet 2 tablet        (Auto Hold since Tue 3/21/2023 at 1600.Hold Reason: Transfer to a procedural area)   See Hyperspace for full Linked Orders Report.    2 tablet Oral 2 TIMES DAILY PRN 03/21/23 0204      03/21/23 0202  [Auto Hold]  oxyCODONE (ROXICODONE) tablet 5 mg        (Auto Hold since Tue 3/21/2023 at 1600.Hold Reason: Transfer to a procedural area)    5 mg Oral EVERY 4 HOURS PRN 03/21/23 0204      03/21/23 0202  [Auto Hold]  acetaminophen  "(TYLENOL) tablet 650 mg        (Auto Hold since Tue 3/21/2023 at 1600.Hold Reason: Transfer to a procedural area)   See Hyperspace for full Linked Orders Report.    650 mg Oral EVERY 4 HOURS PRN 03/21/23 0204      03/21/23 0202  [Auto Hold]  acetaminophen (TYLENOL) solution 650 mg        (Auto Hold since Tue 3/21/2023 at 1600.Hold Reason: Transfer to a procedural area)   See Hyperspace for full Linked Orders Report.    650 mg Per NG tube EVERY 4 HOURS PRN 03/21/23 0204      03/21/23 0202  [Auto Hold]  HYDROmorphone (DILAUDID) injection 0.2 mg        (Auto Hold since Tue 3/21/2023 at 1600.Hold Reason: Transfer to a procedural area)    0.2 mg Intravenous EVERY 2 HOURS PRN 03/21/23 0204            Primary Service Contacted with Recommendations? Yes        Acute Inpatient Pain Service [facility]  Hours of pain coverage 24/7   Page via Amcom- Please Page the Pain Team Via Amcom: \"PAIN MANAGEMENT ACUTE INPATIENT/ [facility]\"           "

## 2023-03-21 NOTE — PROGRESS NOTES
Transfer Type: [facility]  Transfer Triage Note    Originating unit: [place]    Final intended location for transfer: [facility] surg or IMC, or ICU    Tele required:  No    Time of admission request: 1500    Anticipated to be boarding in ED for >4 hours? No    Brief case description: pt. Transferred w/ EMT via stretcher. No infusions, 1 LPM via NC. VSS. pre op surgical bath performed. NAD noted at the time. Family at bedside.     Jaciel Byrnes RN

## 2023-03-21 NOTE — ANESTHESIA PREPROCEDURE EVALUATION
"Anesthesia Pre-Procedure Evaluation    Patient: Julio Grier   MRN: [id number] : 1934        Procedure : Procedure(s):  Hemiarthroplasty Left Hip          Past Medical History:   Diagnosis Date     Diabetes mellitus (H)      Unspecified cerebral artery occlusion with cerebral infarction       Past Surgical History:   Procedure Laterality Date     BACK SURGERY       ORTHOPEDIC SURGERY        Allergies   Allergen Reactions     Aspirin Unknown     Irbesartan Unknown     Penicillins Other (See Comments)     Pt has some sort of reaction at dentist office after PEN supposedly. Pt cannot verify nor can family. Pt has no reaction to \"stephane\" products as was previously indicated. This was verified by familly. All this reviewed with opthalmalogist and anestheiologist on 2018      Social History     Tobacco Use     Smoking status: Never     Smokeless tobacco: Never   Substance Use Topics     Alcohol use: Not Currently      Wt Readings from Last 1 Encounters:   23 40.8 kg (89 lb 15.2 oz)        Anesthesia Evaluation   Pt has had prior anesthetic. Type: General.    No history of anesthetic complications       ROS/MED HX  ENT/Pulmonary: Comment: H/o TB, treated  On 1L NC    Ct chest 3/2023: 1. Right upper lobe spiculated mass. On review of prior from   there was a groundglass nodule in the same region which could indicate  a slow growing adenocarcinoma. Differential include infection and  scarring from prior infection.  2. Unchanged calcified nodules at the left apex, likely old  granulomatous disease.  3. Calcified and noncalcified prominent mediastinal lymph nodes. These  are possibly secondary to prior granulomatous disease although  superimposed malignant lymph nodes cannot be excluded.  4.. Visualized left femoral neck fracture with diffuse osteopenia.  Pathologic fracture not excluded given the findings in impression #1.  No additional suspicious osseous lesions are visualized.  5. Unchanged appearance " of the wall thickening with faint surrounding  fat stranding, suggestive of cystitis. Correlate with urinalysis.  6. 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 Stable since prior CT of the abdomen dictated  6/3/2022.      Neurologic: Comment: 3/2023: Diffuse cerebral and cerebellar parenchymal volume loss.  Scattered periventricular and subcortical white matter hypodensities,  likely sequela of chronic small vessel ischemic disease. Bilateral  basal ganglia calcifications. Stable lacunar infarct within the right  corona radiata. Unchanged presumed small arachnoid cyst in the left  frontal convexity. There is no intracranial hemorrhage, mass effect,  or midline shift. Gray/white matter differentiation in both cerebral  hemispheres is preserved. Ventricles are proportionate to the cerebral  sulci. The basal cisterns are clear. Bilateral pseudophakia. No acute  calvarial fractures. The right mastoid air cells are clear. Unchanged  small left mastoid effusion. Unchanged scattered mucosal thickening in  the paranasal sinuses.    (+) CVA, date: 2011, with deficits, - L sided weakness and difficulty swallowing.     Cardiovascular: Comment: Elevated troponin on admission, downtrending, deemed demand ischemia    (+) Dyslipidemia hypertension-----Taking blood thinners Previous cardiac testing   Echo: Date: 3/2023 Results:  Global and regional left ventricular function is normal with an EF of 55-60%.  Mild concentric wall thickening consistent with left ventricular hypertrophy  is present.  Global right ventricular function is normal. The right ventricle is normal  size.  Mild mitral insufficiency is present.  Mild tricuspid insufficiency is present.  The inferior vena cava cannot be assessed.  Trivial pericardial effusion is present.  Chamber compression is not present; there is no evidence for tamponade.  Stress Test: Date: Results:    ECG Reviewed: Date: Results:    Cath: Date:  Results:   (-) murmur   METS/Exercise Tolerance: 1 - Eating, dressing    Hematologic:     (+) anemia,     Musculoskeletal:   (+) fracture, Fracture location: LLE,     GI/Hepatic: Comment: Chronic constopation   (-) GERD   Renal/Genitourinary:       Endo:     (+) type II DM,     Psychiatric/Substance Use:       Infectious Disease: Comment: UTI, elevated lactate to 4s on admission now 1      Malignancy:       Other:            Physical Exam    Airway      Comment: Unable to fully assess mallampati due to limited cooperation     TM distance: > 3 FB   Neck ROM: full   Mouth opening: > 3 cm    Respiratory Devices and Support  Comment: patietn seemed confused. Focused on hip pain   Nasal Canula 1  l/min.     Dental     Comment: Patient reports some loose on bottom    (+) Multiple visibly decayed, broken teeth      Cardiovascular   cardiovascular exam normal       Rhythm and rate: regular and normal (-) no murmur    Pulmonary   pulmonary exam normal        breath sounds clear to auscultation           OUTSIDE LABS:  CBC:   Lab Results   Component Value Date    WBC 10.2 03/20/2023    WBC 9.2 06/03/2022    HGB 11.4 (L) 03/20/2023    HGB 11.6 (L) 06/03/2022    HCT 35.2 03/20/2023    HCT 34.5 (L) 06/03/2022     03/20/2023     06/03/2022     BMP:   Lab Results   Component Value Date     03/21/2023     03/20/2023    POTASSIUM 3.7 03/21/2023    POTASSIUM 4.0 03/20/2023    CHLORIDE 101 03/21/2023    CHLORIDE 107 03/20/2023    CO2 24 03/21/2023    CO2 20 (L) 03/20/2023    BUN 10.9 03/21/2023    BUN 17.2 03/20/2023    CR 0.67 03/21/2023    CR 0.78 03/20/2023     (H) 03/21/2023    GLC 85 03/21/2023     COAGS:   Lab Results   Component Value Date    PTT 28 01/27/2011    INR 1.06 03/20/2023     POC:   Lab Results   Component Value Date     (H) 02/08/2011     HEPATIC:   Lab Results   Component Value Date    ALBUMIN 3.5 03/20/2023    PROTTOTAL 6.0 (L) 03/20/2023    ALT 7 (L) 03/20/2023    AST 19  03/20/2023    ALKPHOS 43 03/20/2023    BILITOTAL 0.4 03/20/2023     OTHER:   Lab Results   Component Value Date    LACT 1.1 03/21/2023    A1C 8.8 (H) 01/27/2011    MARY 8.8 03/21/2023    PHOS 2.7 03/21/2023    LIPASE 12 (L) 03/20/2023    CRP 54.3 (H) 03/16/2010       Anesthesia Plan    ASA Status:  3   NPO Status:  Will be NPO Appropriate at ... 3/21/2023 5:00 PM   Anesthesia Type: General.     - Airway: ETT   Induction: Intravenous.   Maintenance: Balanced.   Techniques and Equipment:     - Lines/Monitors: 2nd IV, Arterial Line, BIS     - Drips/Meds: Phenylephrine, Dexmed. bolus     Consents    Anesthesia Plan(s) and associated risks, benefits, and realistic alternatives discussed. Questions answered and patient/representative(s) expressed understanding.    - Discussed:     - Discussed with:  Patient,  (and son)      - Extended Intubation/Ventilatory Support Discussed: Yes.      - Patient is DNR/DNI Status: No    Use of blood products discussed: Yes.     - Discussed with: Patient.     - Consented: consented to blood products            Reason for refusal: other.     Postoperative Care    Pain management: Multi-modal analgesia, Oral pain medications, IV analgesics.        Comments:    Other Comments: Discussed risks of anesthesia including nausea, vomiting, sore throat, dental damage, cardiac complications, pulmonary complications, delirium, , neurologic complications, and serious complications. Discussed with loose or damaged teeth, we would be careful but there was the chance of dislodgement and/or damage.            Sara Nguyen MD

## 2023-03-21 NOTE — H&P
[facility]    History and Physical / Consult note: Trauma Service     Date of Admission:  3/20/2023    Time of Admission/Consult Request (page/call): 8:15 PM    Time of my evaluation: 9:00 PM  Consulting services:  Orthopedics - Emergent consult (within 30 mins): Called by ED       Assessment & Plan   Trauma mechanism: Ground level fall  Time/date of injury: 2:00 PM 3/20/22    Known Injuries:  1. Femoral neck fracture - Left  Other diagnoses:   1. Sepsis  2. ? TB    Plan:  1. Admit to Trauma SICU  2. Follow-up Orthopedic recommendations  3. Consult Cardiology  4. Follow-up interval head CT 3 AM  5. Pain control  6. IVFs  7. Follow-up Lactic  8. Follow-up scans  9. PT/OT  10. Med rec  11. Follow-up cultures   12. Broad  Spectrum antibiotics  13. ID consult for TB     Code status: full confirmed with son.     ETOH: This patient was asked if in the last 3-6 months there has been a time when she had 4 or more drinks in a single day/outing.. Patient answer to the screening question was in the negative. No intervention needed.  Primary Care Physician   Liz Rodriguez    Chief Complaint   Ground level fall    History is obtained from the patient and patient's children    History of Present Illness   Young BLANCA Grier is a 88 year old female on Plavix, previous CVA. She has a history of UTIs. She was found by her son and daughter in law right after falling off the commode. They do not believe she hit her head, and there was no LOC. She complained of pain and was BIBA. She denies headache. She denies other pain other than left hip. She denies SOB/CP.    Past Medical History    I have reviewed this patient's medical history and updated it with pertinent information if needed.   Past Medical History:   Diagnosis Date     Diabetes mellitus (H)      Unspecified cerebral artery occlusion with cerebral infarction        Past Surgical History   I have reviewed this patient's surgical  history and updated it with pertinent information if needed.  Past Surgical History:   Procedure Laterality Date     BACK SURGERY       ORTHOPEDIC SURGERY       Prior to Admission Medications   Prior to Admission Medications   Prescriptions Last Dose Informant Patient Reported? Taking?   LANTUS SOLOSTAR 100 UNIT/ML soln   Yes No   Sig: INJECT 40 UNITS IN MORNING AND 32 UNITS IN EVENING   amLODIPine (NORVASC) 2.5 MG tablet   Yes No   Sig: Take 1 tablet by mouth daily   atorvastatin (LIPITOR) 20 MG tablet   Yes No   Sig: Take 1 tablet by mouth daily   betamethasone valerate (VALISONE) 0.1 % external cream   Yes No   Sig: APPLY TO AFFECTED AREA TWICE A DAY   calcium carbonate 600 mg-vitamin D 400 units (CALCIUM CARB-CHOLECALCIFEROL) 600-400 MG-UNIT per tablet   Yes No   Sig: Take 1 tablet by mouth   ciprofloxacin (CIPRO) 500 MG tablet   Yes No   Sig: Take 1 tablet by mouth every 12 hours.   ciprofloxacin (CIPRO) 500 MG tablet   No No   Sig: Take 1 tablet (500 mg) by mouth 2 times daily   clopidogrel (PLAVIX) 75 MG tablet   Yes No   Sig: Take 75 mg by mouth daily   fish oil-omega-3 fatty acids 1000 MG capsule   Yes No   Sig: Take 1 capsule by mouth 2 times daily.   fluocinonide (LIDEX) 0.05 % external ointment   Yes No   glipiZIDE (GLUCOTROL) 5 MG tablet   Yes No   Sig: Take 1 tablet by mouth daily.   hydrOXYzine (ATARAX) 10 MG tablet   Yes No   Sig: TAKE 1 TABLET BY MOUTH TWICE A DAY AS NEEDED FOR ITCHING. MAY CAUSE SLEEPINESS   hypromellose-dextran (ARTIFICAL TEARS) 0.1-0.3 % ophthalmic solution   Yes No   Si-2 drops   metformin (GLUCOPHAGE) 1000 MG tablet   Yes No   Sig: Take 1 tablet by mouth every 12 hours.   mirtazapine (REMERON) 7.5 MG tablet   Yes No   Sig: TAKE 1 TABLET BY MOUTH EVERYDAY AT BEDTIME   oxybutynin (DITROPAN-XL) 10 MG 24 hr tablet   Yes No   Sig: Take 1 tablet by mouth daily.   simvastatin (ZOCOR) 20 MG tablet   Yes No   Sig: Take 1 tablet by mouth At Bedtime.      Facility-Administered  "Medications: None     Allergies   Allergies   Allergen Reactions     Aspirin Unknown     Irbesartan Unknown     Penicillins Other (See Comments)     Pt has some sort of reaction at dentist office after PEN supposedly. Pt cannot verify nor can family. Pt has no reaction to \"stephane\" products as was previously indicated. This was verified by familly. All this reviewed with opthalmalogist and anestheiologist on 5/17/2018       Social History   Social History     Socioeconomic History     Marital status:      Spouse name: Not on file     Number of children: Not on file     Years of education: Not on file     Highest education level: Not on file   Occupational History     Not on file   Tobacco Use     Smoking status: Never     Smokeless tobacco: Never   Substance and Sexual Activity     Alcohol use: Not Currently     Drug use: Never     Sexual activity: Not Currently   Other Topics Concern     Not on file   Social History Narrative     Not on file     Social Determinants of Health     Financial Resource Strain: Not on file   Food Insecurity: Not on file   Transportation Needs: Not on file   Physical Activity: Not on file   Stress: Not on file   Social Connections: Not on file   Intimate Partner Violence: Not on file   Housing Stability: Not on file       Family History   Family history reviewed with patient and is noncontributory.    Review of Systems   CONSTITUTIONAL: No fever, chills, sweats, fatigue   EYES: no visual blurring, no double vision or visual loss  ENT: no decrease in hearing, no tinnitus, no vertigo, no hoarseness  RESPIRATORY: no shortness of breath, no cough, no sputum   CARDIOVASCULAR: no palpitations, no chest  pain, no exertional chest pain or pressure  GASTROINTESTINAL: no nausea or vomiting, or abd pain  GENITOURINARY: frequent UTIs  MUSCULOSKELETAL: no weakness, no redness, no swelling, no joint pain,   SKIN: no rashes, ecchymoses, abrasions or lacerations  NEUROLOGIC: no numbness or tingling " of hands, no numbness or tingling  of feet, no syncope, no tremors or weakness  PSYCHIATRIC: no sleep disturbances, no anxiety or depression    Physical Exam   Temp: 98  F (36.7  C) Temp src: Oral BP: (!) 165/74 Pulse: 92   Resp: 15 SpO2: 98 % O2 Device: None (Room air)    Vital Signs with Ranges  Temp:  [98  F (36.7  C)] 98  F (36.7  C)  Pulse:  [92] 92  Resp:  [15] 15  BP: (165-168)/(74-90) 165/74  SpO2:  [98 %] 98 % 0 lbs 0 oz    Primary Survey:  Airway: patient talking  Breathing: symmetric respiratory effort bilaterally  Circulation: central pulses present and peripheral pulses present  Disability: Pupils - left 4 mm and brisk, right 4 mm and brisk     Sidney Coma Scale - Total 15/15  Eye Response (E): 4  4= spontaneous,  3= to verbal/voice, 2=  to pain, 1= No response   Verbal Response (V): 5   5= Orientated, converses,  4= Confused, converses, 3= Inappropriate words,  2= Incomprehensible sounds,  1=No response   Motor Response (M): 6   6= Obeys commands, 5= Localizes to pain, 4= Withdrawal to pain, 3=Fexion to pain, 2= Extension to pain, 1= No response    Secondary Survey:  General: alert, oriented to person, place  Head: atraumatic, normocephalic, trachea midline  Eyes: PERRLA, pupils 3mm, EOMI, corneas and conjunctivae clear  Ears: non-inflamed external ear canals  Nose: nares patent, no drainage, nasal septum non-tender  Mouth/Throat: no exudates or erythema,  no dental tenderness or malocclusions, no tongue lacerations  Neck:  no cervical collar present. No midline posterior tenderness, full AROM without pain or tenderness   Chest/Pulmonary: normal respiratory rate and rhythm,  bilateral clear breath sounds, no wheezes, rales or rhonchi, no chest wall tenderness or deformities,   Cardiovascular: S1, S2,  normal and regular rate and rhythm, no murmurs  Abdomen: soft, non-tender, no guarding, no rebound tenderness and no tenderness to palpation  :pelvis stable to lateral compression,   Back/Spine: no  deformity, no midline tenderness, no sacral tenderness,  no step-offs and no abrasions or contusions  Musculoskel/Extremities: normal extremities, full AROM of major joints without tenderness, edema, erythema, ecchymosis, or abrasions   Hand: no gross deformities of hands or fingers. Full AROM of hand and fingers in flexion and extension.  strength equal and symmetric.   Skin: no rashes, laceration, ecchymosis, skin warm and dry.   Neuro: PERRLA, alert, oriented x 2. CN II-XII grossly intact. weekness on left - LUE and LLE  Psychiatric: affect/mood normal, cooperative, normal judgement/insight and memory intact  # Pain Assessment:  Current Pain Score 3/20/2023   Patient currently in pain? yes   Pain score (0-10) -   - Julio is experiencing pain due to fracture. Pain management was discussed and the plan was created in a collaborative fashion.  Julio's response to the current recommendations: engaged  - Pharmacologic adjuvants: Acetaminophen        Data   UA RESULTS:  Recent Labs   Lab Test 06/03/22  1556   COLOR Orange*   APPEARANCE Slightly Cloudy*   URINEGLC Negative   URINEBILI Negative   URINEKETONE Negative   SG 1.011   UBLD Large*   URINEPH 8.0*   PROTEIN 30*   NITRITE Positive*   LEUKEST Large*   RBCU >182*   WBCU >182*      Results for orders placed or performed during the hospital encounter of 03/20/23 (from the past 24 hour(s))   Glucose by meter   Result Value Ref Range    GLUCOSE BY METER POCT 164 (H) 70 - 99 mg/dL   Extra Tube ([place] Draw)    Narrative    The following orders were created for panel order Extra Tube ([place] Draw).  Procedure                               Abnormality         Status                     ---------                               -----------         ------                     Extra Blue Top Tube[[id number]]                              Final result               Extra Green Top (Lithium...[[id number]]                      Final result               Extra Purple Top  Tube[[id number]]                            Final result                 Please view results for these tests on the individual orders.   Extra Blue Top Tube   Result Value Ref Range    Hold Specimen JIC    Extra Green Top (Lithium Heparin) Tube   Result Value Ref Range    Hold Specimen JIC    Extra Purple Top Tube   Result Value Ref Range    Hold Specimen JIC    CBC with platelets differential    Narrative    The following orders were created for panel order CBC with platelets differential.  Procedure                               Abnormality         Status                     ---------                               -----------         ------                     CBC with platelets and d...[[id number]]  Abnormal            Final result                 Please view results for these tests on the individual orders.   INR   Result Value Ref Range    INR 1.06 0.85 - 1.15   Comprehensive metabolic panel   Result Value Ref Range    Sodium 140 136 - 145 mmol/L    Potassium 4.0 3.4 - 5.3 mmol/L    Chloride 107 98 - 107 mmol/L    Carbon Dioxide (CO2) 20 (L) 22 - 29 mmol/L    Anion Gap 13 7 - 15 mmol/L    Urea Nitrogen 17.2 8.0 - 23.0 mg/dL    Creatinine 0.78 0.51 - 0.95 mg/dL    Calcium 9.1 8.8 - 10.2 mg/dL    Glucose 192 (H) 70 - 99 mg/dL    Alkaline Phosphatase 43 35 - 104 U/L    AST 19 10 - 35 U/L    ALT 7 (L) 10 - 35 U/L    Protein Total 6.0 (L) 6.4 - 8.3 g/dL    Albumin 3.5 3.5 - 5.2 g/dL    Bilirubin Total 0.4 <=1.2 mg/dL    GFR Estimate 73 >60 mL/min/1.73m2   Lipase   Result Value Ref Range    Lipase 12 (L) 13 - 60 U/L   Troponin T, High Sensitivity   Result Value Ref Range    Troponin T, High Sensitivity 30 (H) <=14 ng/L   CBC with platelets and differential   Result Value Ref Range    WBC Count 10.2 4.0 - 11.0 10e3/uL    RBC Count 3.40 (L) 3.80 - 5.20 10e6/uL    Hemoglobin 11.4 (L) 11.7 - 15.7 g/dL    Hematocrit 35.2 35.0 - 47.0 %     (H) 78 - 100 fL    MCH 33.5 (H) 26.5 - 33.0 pg    MCHC 32.4 31.5 - 36.5 g/dL     RDW 14.8 10.0 - 15.0 %    Platelet Count 210 150 - 450 10e3/uL    % Neutrophils 74 %    % Lymphocytes 15 %    % Monocytes 6 %    % Eosinophils 4 %    % Basophils 0 %    % Immature Granulocytes 1 %    NRBCs per 100 WBC 0 <1 /100    Absolute Neutrophils 7.5 1.6 - 8.3 10e3/uL    Absolute Lymphocytes 1.6 0.8 - 5.3 10e3/uL    Absolute Monocytes 0.6 0.0 - 1.3 10e3/uL    Absolute Eosinophils 0.4 0.0 - 0.7 10e3/uL    Absolute Basophils 0.0 0.0 - 0.2 10e3/uL    Absolute Immature Granulocytes 0.1 <=0.4 10e3/uL    Absolute NRBCs 0.0 10e3/uL   CK total   Result Value Ref Range    CK 45 26 - 192 U/L   ABO/Rh type and screen *Canceled*    Narrative    The following orders were created for panel order ABO/Rh type and screen.  Procedure                               Abnormality         Status                     ---------                               -----------         ------                     Adult Type and Screen[[id number]]                                                         Please view results for these tests on the individual orders.   Lactic acid whole blood   Result Value Ref Range    Lactic Acid 3.9 (H) 0.7 - 2.0 mmol/L   XR Pelvis w Hip Left 1 View    Narrative    EXAM: XR PELVIS AND HIP LEFT 1 VIEW  LOCATION: [facility]  DATE/TIME: 3/20/2023 5:39 PM    INDICATION: left hip leg pain  COMPARISON: CT abdomen pelvis 06/03/2022      Impression    IMPRESSION: Acute fracture of the left femoral neck with mild displacement and varus angulation. The margins of the fracture are ill-defined and an underlying lytic lesion is possible but not considered likely. CT could assess further.    There is diffuse bony demineralisation. Mild degenerative arthritis left hip joint. Sclerotic lesions involving the pubic bones are stable. Postoperative and degenerative changes in lumbar spine.   Lactic acid whole blood   Result Value Ref Range    Lactic Acid 4.3 (HH) 0.7 - 2.0 mmol/L   Troponin  T, High Sensitivity   Result Value Ref Range    Troponin T, High Sensitivity 127 (HH) <=14 ng/L   Glucose by meter   Result Value Ref Range    GLUCOSE BY METER POCT 77 70 - 99 mg/dL   Cervical spine CT w/o contrast    Narrative    EXAM: CT CERVICAL SPINE W/O CONTRAST  3/20/2023 8:59 PM     HISTORY:  fall, > 66 yo, can't move left leg well, fall, > 66 yo,  can't move left leg well       COMPARISON:  None.    TECHNIQUE: Using multidetector thin collimation helical acquisition  technique, axial, coronal and sagittal CT images through the cervical  spine were obtained without intravenous contrast.    FINDINGS:  Mild leftward convexity curvature. Reversal of normal cervical  lordosis. Normal vertebral body alignment. No acute fracture or  traumatic subluxation. Mild disc space loss at C4-5. Mild loss of disc  height at C5-6 and C6-7. No prevertebral edema. Multilevel  degenerative changes including vertebral endplate irregularity, vacuum  phenomenon, and endplate osteophytic spurring.    The findings on a level by level basis are as follows:    C2-3:  Left facet hypertrophy. No spinal canal or neural foraminal  stenosis.    C3-4:  Posterior disc bulge and superimposed central disc herniation.  Moderate spinal canal narrowing. No neural foraminal narrowing.    C4-5:  Posterior disc osteophyte complex. Right greater than left  uncinate hypertrophy. Mild spinal canal narrowing. Mild right neural  foraminal narrowing. No left neural foraminal stenosis.    C5-6:  Disc osteophyte complex. No spinal canal narrowing. No  neuroforaminal narrowing.    C6-7:  Disc osteophyte complex. No spinal canal or neural foraminal  stenosis.    C7-T1:  No spinal canal or neural foraminal stenosis.     No abnormality of the paraspinous soft tissues. Please see same-day CT  CAP for body findings, including findings in the lungs.      Impression    IMPRESSION:  1. No acute fracture or traumatic subluxation.  2. Cervical spondylosis without  high-grade spinal canal or neural  foraminal stenosis.    I have personally reviewed the examination and initial interpretation  and I agree with the findings.    YENNIFER BUSTILLO MD         SYSTEM ID:  [id number]   Head CT w/o contrast    Narrative    EXAM: CT HEAD W/O CONTRAST  3/20/2023 9:00 PM     HISTORY:  fall, cant move left leg well Can't tell if pain or strength  issue       COMPARISON:  Head CT 1/1/2013    TECHNIQUE: Using multidetector thin collimation helical acquisition  technique, axial, coronal and sagittal CT images from the skull base  to the vertex were obtained without intravenous contrast.   (topogram) image(s) also obtained and reviewed.    FINDINGS:  No acute intracranial hemorrhage, mass effect, or midline shift. No  acute loss of gray-white matter differentiation in the cerebral  hemispheres. Ventricles are proportionate to the cerebral sulci. Clear  basal cisterns. Confluent T2 periventricular hypodensity which may  relate to chronic small vessel ischemic disease. Old right corona  radiata infarct, unchanged. Cerebral and cerebellar volume loss. Basal  ganglia calcifications.    The bony calvaria and the bones of the skull base are normal.  Incidental 6 mm left ethmoid osteoma, unchanged. Mild left mastoid  effusion, also unchanged. Grossly normal orbits.       Impression    IMPRESSION:   1. No acute intracranial pathology.  2. Unchanged old right corona radiata infarct.    I have personally reviewed the examination and initial interpretation  and I agree with the findings.    YENNIFER BUSTILLO MD         SYSTEM ID:  [id number]   Lumbar spine CT w/o contrast    Narrative    EXAM: CT LUMBAR SPINE W/O CONTRAST  3/20/2023 9:13 PM     HISTORY:  RECON from CT A/P - fall, pain       COMPARISON:  Same day CT CAP    TECHNIQUE: Using multidetector thin collimation helical acquisition  technique, axial, sagittal and coronal 3 mm thickness CT  reconstructions were obtained through the lumbar spine  without  intravenous contrast.  Images were viewed in bone and soft tissue  windows.    FINDINGS:  5 lumbar-type vertebrae are verified by the same day CT CAP. L4-5  posterior lumbar fixation hardware with ankylosis of the posterior  elements. No evidence for hardware failure. Grade 1 anterolisthesis of  L4 and L5, unchanged. No significant disc space narrowing. No acute  fracture. No suspicious osseous lesion. Multilevel lumbar degenerative  changes including anterior osteophytic spurring and Schmorl's nodes.  Osteopenic-appearing bones.    Findings on a level by level basis are as follows:    L1-L2: No spinal canal or neural foraminal stenosis    L2-L3: Circumferential disc bulge. Bilateral facet hypertrophy.  Mild-moderate left neural foraminal narrowing. Mild right  neuroforaminal narrowing. Mild spinal canal narrowing.    L3-L4: Circumferential disc bulge. Ligamentum flavum thickening and  bilateral facet hypertrophy. Mild to moderate bilateral neural  foraminal narrowing. Mild spinal canal narrowing.    L4-L5: Grade 1 anterolisthesis. Posterior fusion instrumentation and  laminectomy changes. Posterior disc bulge and bilateral facet  hypertrophy. Mild bilateral neural foraminal narrowing. Mild spinal  canal narrowing.    L5-S1: Circumferential disc bulge and bilateral facet hypertrophy.  Laminectomy changes. Mild spinal canal narrowing. Moderate right and  mild left neural foraminal narrowing.    Diffuse atrophy of the erector spinae musculature, particularly at and  below the posterior fusion levels.    Bilateral renal cysts.      Impression    IMPRESSION:  1. No acute fracture or traumatic subluxation.  2. L4-5 fusion instrumentation without evidence for hardware failure.  3. Lumbar degenerative changes without high-grade spinal canal  stenosis.    I have personally reviewed the examination and initial interpretation  and I agree with the findings.    YENNIFER BUSTILLO MD         SYSTEM ID:  [id number]   CT  Chest/Abdomen/Pelvis w Contrast    Narrative    EXAMINATION: CT CHEST/ABDOMEN/PELVIS W CONTRAST, 3/20/2023 9:14 PM    TECHNIQUE:  Helical CT images from the thoracic inlet through the  symphysis pubis were obtained  with contrast. Contrast dose: iopamidol  (ISOVUE-370) solution 55 mL    COMPARISON: 6/3/2022, 3/14/2010    HISTORY: fall, low chest/abdominal pain, left hip/leg pain    FINDINGS:    Chest: Subcentimeter hypoattenuating nodule within the inferior left  thyroid lobe. No suspicious base of the neck or axillary  lymphadenopathy. Nonspecific hyperattenuating periesophageal region,  favored to represent adjacent lymph node (series 3 image 72).  Additional prominent mediastinal and perihilar lymph nodes such as 2.2  x 1.2 centimeter subcarinal lymph node (series 3 image 69), some of  which are at least partially calcified. Normal caliber of the major  thoracic vessels. No definite evidence of pulmonary embolism in this  nondedicated study. Conventional branching pattern of the aortic arch.  Normal cardiac size. 2.1 x 2.0 x 3.1 cm spiculated right upper lobe  lung mass with areas of calcification superiorly. On 3/14/2020 a  groundglass nodule could be visualized in the same area. Areas of  calcification were present previously and appears grossly stable.  Consolidation of the right middle lobe, likely fibrosis from prior  abscess. Partially calcified cavitary lesion in the left upper lobe  measuring 8 x 6 mm (series 6 image 32).    Liver: No mass. No intrahepatic biliary ductal dilation.    Biliary System: Normal gallbladder. No extrahepatic biliary ductal  dilation.    Pancreas: No pancreatic ductal dilation. 1.3 x 1.2 cm cystic lesion in  the inferior aspect of the uncinate process of the pancreatic head,  unchanged from prior CT. No calcifications or internal masses. Wall is  prominent but unchanged.    Adrenal glands: No mass or nodules    Spleen: Normal.    Kidneys: No suspicious mass, obstructing calculus  or hydronephrosis.   Simple renal cysts.    Gastrointestinal tract :Normal appendix. Normal caliber small bowel.   Duodenal diverticula in the terminal descending duodenum (series 7  image 160)    Mesentery/peritoneum/retroperitoneum: No mass. No free fluid or air.    Lymph nodes: No significant lymphadenopathy.    Vasculature: Patent major abdominal vasculature.    Pelvis: Bladder wall thickening with surrounding fat stranding in the  left bladder wall. Atrophic uterus.      Soft tissues: Sacral pressure ulcer. Asymmetric atrophy of the left  shoulder and visualized arm muscles.    Osseous structures: Displaced fracture of the left femoral neck with  mild valgus angulation.. Diffuse osteopenia. Although not excluded  this does not appear to represent a pathologic fracture. Sclerotic  lesion in the right pubic bone, unchanged from 6/3/2022. Unchanged  posterior fixation hardware of L4-L5. No additional fractures are  visualized. Diffusely osteopenic bones      Impression    IMPRESSION:   1. Right upper lobe spiculated mass. On review of prior from 2010  there was a groundglass nodule in the same region which could indicate  a slow growing adenocarcinoma. Differential include infection and  scarring from prior infection.  2. Unchanged calcified nodules at the left apex, likely old  granulomatous disease.  3. Calcified and noncalcified prominent mediastinal lymph nodes. These  are possibly secondary to prior granulomatous disease although  superimposed malignant lymph nodes cannot be excluded.  4.. Visualized left femoral neck fracture with diffuse osteopenia.  Pathologic fracture not excluded given the findings in impression #1.  No additional suspicious osseous lesions are visualized.  5. Unchanged appearance of the wall thickening with faint surrounding  fat stranding, suggestive of cystitis. Correlate with urinalysis.  6. 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 Stable  since prior CT of the abdomen dictated  6/3/2022.     XR Femur Left 2 Views    Narrative    EXAM: XR FEMUR LEFT 2 VIEWS  LOCATION: [facility]  DATE/TIME: 3/20/2023 10:17 PM    INDICATION: full length femur for ortho  COMPARISON: Left hip 03/20/2023 at 5:26 PM      Impression    IMPRESSION: Left femoral neck fracture again identified. No distal femur fracture or other acute abnormality.   Glucose by meter   Result Value Ref Range    GLUCOSE BY METER POCT 92 70 - 99 mg/dL   UA with Microscopic reflex to Culture    Specimen: Urine, Catheter   Result Value Ref Range    Color Urine Yellow Colorless, Straw, Light Yellow, Yellow    Appearance Urine Cloudy (A) Clear    Glucose Urine Negative Negative mg/dL    Bilirubin Urine Negative Negative    Ketones Urine Negative Negative mg/dL    Specific Gravity Urine 1.025 1.003 - 1.035    Blood Urine Moderate (A) Negative    pH Urine 7.0 5.0 - 7.0    Protein Albumin Urine 30 (A) Negative mg/dL    Urobilinogen Urine Normal Normal, 2.0 mg/dL    Nitrite Urine Positive (A) Negative    Leukocyte Esterase Urine Large (A) Negative    Bacteria Urine Few (A) None Seen /HPF    WBC Clumps Urine Present (A) None Seen /HPF    RBC Urine 12 (H) <=2 /HPF    WBC Urine >182 (H) <=5 /HPF    Narrative    Urine Culture ordered based on laboratory criteria   ABO/Rh type and screen *Canceled*    Narrative    The following orders were created for panel order ABO/Rh type and screen.  Procedure                               Abnormality         Status                     ---------                               -----------         ------                       Please view results for these tests on the individual orders.   Glucose by meter   Result Value Ref Range    GLUCOSE BY METER POCT 132 (H) 70 - 99 mg/dL   MRSA MSSA PCR, Nasal Swab    Specimen: Nare, Left; Swab   Result Value Ref Range    MRSA Target DNA Negative Negative    SA Target DNA Negative      Narrative    The [employer] [facility] Tracking  Xpert SA Nasal Complete assay performed in the [employer]  Dx System is a qualitative in vitro diagnostic test designed for rapid detection of Staphylococcus aureus (SA) and methicillin-resistant Staphylococcus aureus (MRSA) from nasal swabs in patients at risk for nasal colonization. The test utilizes automated real-time polymerase chain reaction (PCR) to detect MRSA/SA DNA. The Xpert SA Nasal Complete assay is intended to aid in the prevention and control of MRSA/SA infections in healthcare settings. The assay is not intended to diagnose, guide or monitor treatment for MRSA/SA infections, or provide results of susceptibility to methicillin. A negative result does not preclude MRSA/SA nasal colonization.    Lactic acid whole blood   Result Value Ref Range    Lactic Acid 2.9 (H) 0.7 - 2.0 mmol/L   Troponin T, High Sensitivity   Result Value Ref Range    Troponin T, High Sensitivity 103 (HH) <=14 ng/L   ABO/Rh type and screen    Narrative    The following orders were created for panel order ABO/Rh type and screen.  Procedure                               Abnormality         Status                     ---------                               -----------         ------                     Adult Type and Screen[[id number]]                            Final result                 Please view results for these tests on the individual orders.   Adult Type and Screen   Result Value Ref Range    ABO/RH(D) O POS     Antibody Screen Negative Negative    SPECIMEN EXPIRATION DATE [id number]    Glucose by meter   Result Value Ref Range    GLUCOSE BY METER POCT 81 70 - 99 mg/dL       Studies:  XR Pelvis w Hip Left 1 View   Final Result   IMPRESSION: Acute fracture of the left femoral neck with mild displacement and varus angulation. The margins of the fracture are ill-defined and an underlying lytic lesion is possible but not considered likely. CT could assess further.      There is diffuse bony  demineralisation. Mild degenerative arthritis left hip joint. Sclerotic lesions involving the pubic bones are stable. Postoperative and degenerative changes in lumbar spine.      CT Chest/Abdomen/Pelvis w Contrast    (Results Pending)   Lumbar spine CT w/o contrast    (Results Pending)   Head CT w/o contrast    (Results Pending)   Cervical spine CT w/o contrast    (Results Pending)       Discussed with trauma staff on call, Dr. Kaylee John MD  [facility]

## 2023-03-21 NOTE — CONSULTS
Care Management Initial Consult    General Information  Assessment completed with: Patient, Children, Chloe Grier, son  Type of CM/SW Visit: Initial Assessment    Primary Care Provider verified and updated as needed: Yes   Readmission within the last 30 days: no previous admission in last 30 days      Reason for Consult: health care directive  Advance Care Planning: Advance Care Planning Reviewed: no concerns identified          Communication Assessment  Patient's communication style: spoken language (non-English) (Greek)    Hearing Difficulty or Deaf: yes   Wear Glasses or Blind: yes    Cognitive  Cognitive/Neuro/Behavioral: .WDL except, orientation  Level of Consciousness: confused, alert  Arousal Level: opens eyes spontaneously  Orientation: disoriented to, time, place, situation  Mood/Behavior: calm, cooperative  Best Language: 0 - No aphasia  Speech: clear, spontaneous    Living Environment:   People in home: child(ksenia), adult, other (see comments) (Daughter-in-law)     Current living Arrangements: apartment      Able to return to prior arrangements: yes       Family/Social Support:  Care provided by: child(ksenia)  Provides care for: no one, unable/limited ability to care for self  Marital Status:   Children          Description of Support System: Supportive, Involved    Support Assessment: Adequate family and caregiver support    Current Resources:   Patient receiving home care services: Yes  Skilled Home Care Services: Skilled Nursing  Community Resources: None  Equipment currently used at home: shower chair, wheelchair, manual  Supplies currently used at home: Incontinence Supplies    Employment/Financial:  Employment Status: unemployed        Financial Concerns: No concerns identified           Lifestyle & Psychosocial Needs:  Social Determinants of Health     Tobacco Use: Low Risk      Smoking Tobacco Use: Never     Smokeless Tobacco Use: Never     Passive Exposure: Not on file   Alcohol Use: Not on  "file   Financial Resource Strain: Not on file   Food Insecurity: Not on file   Transportation Needs: Not on file   Physical Activity: Not on file   Stress: Not on file   Social Connections: Not on file   Intimate Partner Violence: Not on file   Depression: Not on file   Housing Stability: Not on file       Functional Status:  Prior to admission patient needed assistance:   Dependent ADLs:: Wheelchair-with assist, Transfers, Positioning, Incontinence, Grooming, Eating, Dressing, Bathing  Dependent IADLs:: Cleaning, Cooking, Laundry, Shopping, Meal Preparation, Medication Management, Money Management, Transportation, Incontinence  Assesssment of Functional Status: Needs assistance with transportation, Needs assistance with shopping, Needs assistance with medications, Needs assistance with bathing, Needs assistance with dressing, Needs assistance with meals, Needs assistance with laundry/houskeeping, Needs assistance with handling finances    Mental Health Status:  Mental Health Status: No Current Concerns       Chemical Dependency Status:  Chemical Dependency Status: No Current Concerns             Values/Beliefs:  Spiritual, Cultural Beliefs, Shinto Practices, Values that affect care:                 Additional Information:  Per surgical resident's IC admission note: Patient is, \"is an 88-year-old female with a PMHx of Stroke with left sided wekaness on Plavix, type 2 diabetes on insulin,  had a mechanical fall resulting in fracture of left femur also with a UTI with suspicion for possible early sepsis, getting antibiotics, admitted to the ICU for further monitoring and management\".    SW met with patient and her son to complete the care management assessment and discuss health care directive. Patient lives with her son and daughter-in-law in an apartment. They are her main caretakers, however her son reported they do have a weekly nurse that comes to help with medication. He could not remember the name of the " agency the nurse comes from, but said the information is at their home. Patient's son reported patient is completely dependent with her ADLs and has a wheelchair and shower chair at home.     SW discussed the healthcare directive with son and patient. Son is next of kin for patient, and SW explained that currently he would be the medical decision for patient should the need arise. Both patient and son expressed understanding, and patient stated that would be okay. They did not find a need to sign a healthcare directive at this time. They did not have any further questions or concerns at this time. SW will continue to follow patient for discharge planning.     DHRUV Cano  ICU   Phone: [phone]  Pager: [phone]

## 2023-03-21 NOTE — PROGRESS NOTES
[facility]    Medicine Progress Note - Hospitalist Service, GOLD TEAM     Date of Admission:  3/20/2023    Assessment & Plan   Julio Grier is a 88 year old female patient with a past medical history significant for CVA c/b left hemiparesis & hemiplegia on plavix, T2DM, urinary frequency, tuberculosis, HLD, HTN who was found down by her family after falling off the commode. On arrival to [facility] ED, found to have left femoral neck fracture, lactate 4.9 and tachycardia with UA concerning for urosepsis initially admitted to trauma team in SICU. Subsequently transferred to [facility] for surgical fixation 3/21/23. Patient seen in pre-op area immediately prior to procedure; planning for post-op transfer to [facility] with medicine team as primary.    Left Femoral Neck Fracture  Mechanical Fall  Pt with above history who had a fall with resultant fracture. Regional anesthesia placed left iliac fascia block at [place]. Head CT negative for trauma x 2. Additional imaging for trauma evaluation included: XR pelvis and hip L, CT cervical spine, CT lumbar spine, CT C/A/P. Currently, pain controlled and patient understandably anxious regarding procedure. Ortho team at bedside to discuss procedure details.   - Remains non-weightbearing and NPO   - Planning for OR in the next few minutes   - Fall precautions   - Will need PT, OT and social work post-op    Urosepsis  UTI  Initially received IV Vancomycin and Cefepime x 1. Lactate initially 4.9-->1.1 with IVF. UA: pyuria, +nitrates. BCs NGTD. HD stable.   - Continue Rocephin   - Follow up BCs and UC   - Modi in place currently secondary to femur fracture. Remove post-op in 1-2 days.    Acute Blood Loss Anemia  Hemoglobin 13-->11.4 related to acute fracture and dilution component.   - Check hemoglobin post-op this evening   - Threshold for transfusion if hgb <7.0 or signs/symptoms of hypoperfusion    Elevated Troponin  Demand Ischemia  Troponin  peaked at 127 and since downtrended. EKG without significant ST segment elevation or depression.  TTE EF 55-60%, mild mitral and tricuspid insufficiency, trivial pericardial effusion with no evidence of tamponade.   - Attributed to demand ischemia from urosepsis     H/o CVA c/b Hemiparesis and Hemiplegia  HLD   - Noted some h/o dysphagia in the chart. Will ask SLP to see. Aspiration precautions for now. Clear liquid diet until SLP can evaluate.   - PTA Plavix 75mg daily on hold; resume at surgery discretion   - Continue PTA Lipitor 20mg daily   - Maintain circadian rhythm. Lights on during the day. Off at night, minimize cares at night.  OOB during the day.    T2DM  PTA Humalog 12-16 units (<140 12 units, 141-200 14 units, >200 16 units), Lantus 40 units and 30 units, Metformin 1000mg BID based on review of care everywhere. Our med rec has metformin and glipizide listed. Everything was held upon admission aside from sliding scale insulin and pt experienced hypoglycemia, therefore IVF were changed to D5NS.   - Close BG monitoring post-op and wean D5NS when able   - Continue sliding scale for now   - Hypoglycemia protocol    RUL Spiculated Mass  CT CAP notable for right upper lobe spiculated mass 2.1 x 2.0 x 3.1cm.  - Needs follow up outpatient    HTN   - PTA Norvasc 2.5mg daily on hold in the immediate post-op setting    Concern for Malnutrition   - Resume Mirtazapine 7.5mg bedtime for appetite stimulation   - Nutrition consult    Allergic Rhinitis   - Resume Allegra 90mg BID    Pharmacy consult for med reconciliation.       Diet: NPO for Medical/Clinical Reasons Except for: Meds    DVT Prophylaxis: Pneumatic Compression Devices  Modi Catheter: PRESENT, indication: Strict 1-2 Hour I&O  Lines: None     Cardiac Monitoring: ACTIVE order. Indication: ICU  Code Status: Full Code      Clinically Significant Risk Factors Present on Admission                # Drug Induced Platelet Defect: home medication list includes an  "antiplatelet medication        # Cachexia: Estimated body mass index is 16.45 kg/m  as calculated from the following:    Height as of this encounter: 1.575 m (5' 2.01\").    Weight as of this encounter: 40.8 kg (89 lb 15.2 oz).           Disposition Plan     Expected Discharge Date: 03/22/2023      Destination: home          The patient's care was discussed with the Attending Physician, Dr. Dr. Guajardo.    Flynn Villavicencio PA-C  Hospitalist Service, [facility]  Securely message with QR Wild (more info)  Text page via [facility] Paging/Directory   See signed in provider for up to date coverage information  ______________________________________________________________________    Interval History   Patient transferred from [facility] to [facility] medicine team for scheduled repair L femur fracture. Per my discussion with trauma team, patient no longer requiring ICU level cares and appropriate for the floor, though will likely need IMC post-op.    Patient is seen in pre-op area with her family using Tajik . Reports she is hungry. Pain is controlled. She Is understandable anxious regarding the upcoming procedure. Reviewed home meds with family. There have been no clinical interval changes in her condition since transfer to [facility]. No fevers, chills, chest pain/pressure, shortness of breath, nausea, vomiting, abdominal pain.    Physical Exam   Vital Signs: Temp: 98  F (36.7  C) Temp src: Oral BP: 139/67 Pulse: 71   Resp: 20 SpO2: 100 % O2 Device: Nasal cannula Oxygen Delivery: 2 LPM  Weight: 89 lbs 15.16 oz    GENERAL: Alert and oriented x 3. Well nourished, well developed.  No acute distress.    HEENT: Normocephalic, atraumatic. Anicteric sclera. Mucous membranes moist.   CV: RRR. S1, S2. No murmurs appreciated.   RESPIRATORY: Effort normal on room air. Lungs CTAB with no wheezing, rales, or rhonchi.   GI: Abdomen soft and non distended, bowel sounds " present x all 4 quadrants. No tenderness, rebound, or guarding.   : Modi in place draining clear yellow urine.  NEUROLOGICAL: L hemiplegia (chronic).  MUSCULOSKELETAL: L leg slightly externally rotate. Pedal pulse 2+. Sensation intact. Able to wiggle toes.  EXTREMITIES: No gross deformities. No peripheral edema.   SKIN: Grossly warm, dry, and intact. No jaundice. No rashes.     Medical Decision Making       50 MINUTES SPENT BY ME on the date of service doing chart review, history, exam, documentation & further activities per the note.      Data   Imaging results reviewed over the past 24 hrs:   Recent Results (from the past 24 hour(s))   Cervical spine CT w/o contrast    Narrative    EXAM: CT CERVICAL SPINE W/O CONTRAST  3/20/2023 8:59 PM     HISTORY:  fall, > 64 yo, can't move left leg well, fall, > 64 yo,  can't move left leg well       COMPARISON:  None.    TECHNIQUE: Using multidetector thin collimation helical acquisition  technique, axial, coronal and sagittal CT images through the cervical  spine were obtained without intravenous contrast.    FINDINGS:  Mild leftward convexity curvature. Reversal of normal cervical  lordosis. Normal vertebral body alignment. No acute fracture or  traumatic subluxation. Mild disc space loss at C4-5. Mild loss of disc  height at C5-6 and C6-7. No prevertebral edema. Multilevel  degenerative changes including vertebral endplate irregularity, vacuum  phenomenon, and endplate osteophytic spurring.    The findings on a level by level basis are as follows:    C2-3:  Left facet hypertrophy. No spinal canal or neural foraminal  stenosis.    C3-4:  Posterior disc bulge and superimposed central disc herniation.  Moderate spinal canal narrowing. No neural foraminal narrowing.    C4-5:  Posterior disc osteophyte complex. Right greater than left  uncinate hypertrophy. Mild spinal canal narrowing. Mild right neural  foraminal narrowing. No left neural foraminal stenosis.    C5-6:  Disc  osteophyte complex. No spinal canal narrowing. No  neuroforaminal narrowing.    C6-7:  Disc osteophyte complex. No spinal canal or neural foraminal  stenosis.    C7-T1:  No spinal canal or neural foraminal stenosis.     No abnormality of the paraspinous soft tissues. Please see same-day CT  CAP for body findings, including findings in the lungs.      Impression    IMPRESSION:  1. No acute fracture or traumatic subluxation.  2. Cervical spondylosis without high-grade spinal canal or neural  foraminal stenosis.    I have personally reviewed the examination and initial interpretation  and I agree with the findings.    YENNIFER BUSTILLO MD         SYSTEM ID:  [id number]   Head CT w/o contrast    Narrative    EXAM: CT HEAD W/O CONTRAST  3/20/2023 9:00 PM     HISTORY:  fall, cant move left leg well Can't tell if pain or strength  issue       COMPARISON:  Head CT 1/1/2013    TECHNIQUE: Using multidetector thin collimation helical acquisition  technique, axial, coronal and sagittal CT images from the skull base  to the vertex were obtained without intravenous contrast.   (topogram) image(s) also obtained and reviewed.    FINDINGS:  No acute intracranial hemorrhage, mass effect, or midline shift. No  acute loss of gray-white matter differentiation in the cerebral  hemispheres. Ventricles are proportionate to the cerebral sulci. Clear  basal cisterns. Confluent T2 periventricular hypodensity which may  relate to chronic small vessel ischemic disease. Old right corona  radiata infarct, unchanged. Cerebral and cerebellar volume loss. Basal  ganglia calcifications.    The bony calvaria and the bones of the skull base are normal.  Incidental 6 mm left ethmoid osteoma, unchanged. Mild left mastoid  effusion, also unchanged. Grossly normal orbits.       Impression    IMPRESSION:   1. No acute intracranial pathology.  2. Unchanged old right corona radiata infarct.    I have personally reviewed the examination and initial  interpretation  and I agree with the findings.    YENNIFER BUSTILLO MD         SYSTEM ID:  [id number]   Lumbar spine CT w/o contrast    Narrative    EXAM: CT LUMBAR SPINE W/O CONTRAST  3/20/2023 9:13 PM     HISTORY:  RECON from CT A/P - fall, pain       COMPARISON:  Same day CT CAP    TECHNIQUE: Using multidetector thin collimation helical acquisition  technique, axial, sagittal and coronal 3 mm thickness CT  reconstructions were obtained through the lumbar spine without  intravenous contrast.  Images were viewed in bone and soft tissue  windows.    FINDINGS:  5 lumbar-type vertebrae are verified by the same day CT CAP. L4-5  posterior lumbar fixation hardware with ankylosis of the posterior  elements. No evidence for hardware failure. Grade 1 anterolisthesis of  L4 and L5, unchanged. No significant disc space narrowing. No acute  fracture. No suspicious osseous lesion. Multilevel lumbar degenerative  changes including anterior osteophytic spurring and Schmorl's nodes.  Osteopenic-appearing bones.    Findings on a level by level basis are as follows:    L1-L2: No spinal canal or neural foraminal stenosis    L2-L3: Circumferential disc bulge. Bilateral facet hypertrophy.  Mild-moderate left neural foraminal narrowing. Mild right  neuroforaminal narrowing. Mild spinal canal narrowing.    L3-L4: Circumferential disc bulge. Ligamentum flavum thickening and  bilateral facet hypertrophy. Mild to moderate bilateral neural  foraminal narrowing. Mild spinal canal narrowing.    L4-L5: Grade 1 anterolisthesis. Posterior fusion instrumentation and  laminectomy changes. Posterior disc bulge and bilateral facet  hypertrophy. Mild bilateral neural foraminal narrowing. Mild spinal  canal narrowing.    L5-S1: Circumferential disc bulge and bilateral facet hypertrophy.  Laminectomy changes. Mild spinal canal narrowing. Moderate right and  mild left neural foraminal narrowing.    Diffuse atrophy of the erector spinae musculature,  particularly at and  below the posterior fusion levels.    Bilateral renal cysts.      Impression    IMPRESSION:  1. No acute fracture or traumatic subluxation.  2. L4-5 fusion instrumentation without evidence for hardware failure.  3. Lumbar degenerative changes without high-grade spinal canal  stenosis.    I have personally reviewed the examination and initial interpretation  and I agree with the findings.    YENNIFER BUSTILLO MD         SYSTEM ID:  [id number]   CT Chest/Abdomen/Pelvis w Contrast    Narrative    EXAMINATION: CT CHEST/ABDOMEN/PELVIS W CONTRAST, 3/20/2023 9:14 PM    TECHNIQUE:  Helical CT images from the thoracic inlet through the  symphysis pubis were obtained  with contrast. Contrast dose: iopamidol  (ISOVUE-370) solution 55 mL    COMPARISON: 6/3/2022, 3/14/2010    HISTORY: fall, low chest/abdominal pain, left hip/leg pain    FINDINGS:    Chest: Subcentimeter hypoattenuating nodule within the inferior left  thyroid lobe. No suspicious base of the neck or axillary  lymphadenopathy. Nonspecific hyperattenuating periesophageal region,  favored to represent adjacent lymph node (series 3 image 72).  Additional prominent mediastinal and perihilar lymph nodes such as 2.2  x 1.2 centimeter subcarinal lymph node (series 3 image 69), some of  which are at least partially calcified. Normal caliber of the major  thoracic vessels. No definite evidence of pulmonary embolism in this  nondedicated study. Conventional branching pattern of the aortic arch.  Normal cardiac size. 2.1 x 2.0 x 3.1 cm spiculated right upper lobe  lung mass with areas of calcification superiorly. On 3/14/2020 a  groundglass nodule could be visualized in the same area. Areas of  calcification were present previously and appears grossly stable.  Consolidation of the right middle lobe, likely fibrosis from prior  abscess. Cluster of calcified granulomas in the left upper lobe  (series 6 image 32).    Liver: No mass. No intrahepatic biliary  ductal dilation.    Biliary System: Normal gallbladder. No extrahepatic biliary ductal  dilation.    Pancreas: No pancreatic ductal dilation. 1.3 x 1.2 cm cystic lesion in  the inferior aspect of the uncinate process of the pancreatic head,  unchanged from prior CT. No calcifications or internal masses. Wall is  prominent but unchanged.    Adrenal glands: No mass or nodules    Spleen: Normal.    Kidneys: No suspicious mass, obstructing calculus or hydronephrosis.   Simple renal cysts.    Gastrointestinal tract :Normal appendix. Normal caliber small bowel.   Duodenal diverticula in the terminal descending duodenum (series 7  image 160)    Mesentery/peritoneum/retroperitoneum: No mass. No free fluid or air.    Lymph nodes: No significant lymphadenopathy.    Vasculature: Patent major abdominal vasculature.    Pelvis: Bladder wall thickening with surrounding fat stranding in the  left bladder wall. Atrophic uterus.      Soft tissues: Sacral pressure ulcer. Asymmetric atrophy of the left  shoulder and visualized arm muscles.    Osseous structures: Displaced fracture of the left femoral neck with  mild valgus angulation.. Diffuse osteopenia. Although not excluded  this does not appear to represent a pathologic fracture. Sclerotic  lesion in the right pubic bone, unchanged from 6/3/2022. Unchanged  posterior fixation hardware of L4-L5. No additional fractures are  visualized. Diffusely osteopenic bones      Impression    IMPRESSION:   1. Right upper lobe spiculated mass. On review of prior from 2010  there was a groundglass nodule in the same region which could indicate  a slow growing adenocarcinoma. Differential include infection and  scarring from prior infection.  2. Unchanged calcified nodules at the left apex, likely old  granulomatous disease.  3. Calcified and noncalcified prominent mediastinal lymph nodes. These  are possibly secondary to prior granulomatous disease although  superimposed malignant lymph nodes  cannot be excluded.  4.. Visualized left femoral neck fracture with diffuse osteopenia.  Pathologic fracture not excluded given the findings in impression #1.  No additional suspicious osseous lesions are visualized.  5. Unchanged appearance of the wall thickening with faint surrounding  fat stranding, suggestive of cystitis. Correlate with urinalysis.  6. 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 Stable since prior CT of the abdomen dictated  6/3/2022.    I have personally reviewed the examination and initial interpretation  and I agree with the findings.    HALI ACOSTA MD         SYSTEM ID:  [id number]   XR Femur Left 2 Views    Narrative    EXAM: XR FEMUR LEFT 2 VIEWS  LOCATION: [facility]  DATE/TIME: 3/20/2023 10:17 PM    INDICATION: full length femur for ortho  COMPARISON: Left hip 03/20/2023 at 5:26 PM      Impression    IMPRESSION: Left femoral neck fracture again identified. No distal femur fracture or other acute abnormality.   CT Head w/o Contrast    Narrative    CT HEAD W/O CONTRAST 3/21/2023 6:43 AM    History: interval FU fall, on plavix     Comparison: 3/20/2023    Technique: Using multidetector thin collimation helical acquisition  technique, axial, coronal and sagittal CT images from the skull base  to the vertex were obtained without intravenous contrast.    Findings: Diffuse cerebral and cerebellar parenchymal volume loss.  Scattered periventricular and subcortical white matter hypodensities,  likely sequela of chronic small vessel ischemic disease. Bilateral  basal ganglia calcifications. Stable lacunar infarct within the right  corona radiata. Unchanged presumed small arachnoid cyst in the left  frontal convexity. There is no intracranial hemorrhage, mass effect,  or midline shift. Gray/white matter differentiation in both cerebral  hemispheres is preserved. Ventricles are proportionate to the  cerebral  sulci. The basal cisterns are clear. Bilateral pseudophakia. No acute  calvarial fractures. The right mastoid air cells are clear. Unchanged  small left mastoid effusion. Unchanged scattered mucosal thickening in  the paranasal sinuses.      Impression    Impression:  No acute intracranial pathology. Examination is not significantly  changed compared to prior on 3/20/2023.    I have personally reviewed the examination and initial interpretation  and I agree with the findings.    ELIECER WEINER MD         SYSTEM ID:  [id number]   Echo Complete   Result Value    LVEF  55-60%    Narrative    [id number]  [id number]  [id number]  [id number]^TINA^ERASTO^BETHANY     [facility],[place]  Echocardiography Laboratory  [address] [place] [address]     Name: ERIKA SERRANO  MRN: [id number]  : 1934  Study Date: 2023 07:41 AM  Age: 88 yrs  Gender: Female  Patient Location: [facility]  Reason For Study: Syncope and Collapse  Ordering Physician: ERASTO WILDER  Performed By: Zandra Lawson     BSA: 1.4 m2  Height: 62 in  Weight: 89 lb  HR: 81  BP: 127/71 mmHg  ______________________________________________________________________________  Procedure  Echocardiogram with two-dimensional, color and spectral Doppler performed.  ______________________________________________________________________________  Interpretation Summary  Global and regional left ventricular function is normal with an EF of 55-60%.  Mild concentric wall thickening consistent with left ventricular hypertrophy  is present.  Global right ventricular function is normal. The right ventricle is normal  size.  Mild mitral insufficiency is present.  Mild tricuspid insufficiency is present.  The inferior vena cava cannot be assessed.  Trivial pericardial effusion is present.  Chamber compression is not present; there is no evidence for  tamponade.  ______________________________________________________________________________  Left Ventricle  Global and regional left ventricular function is normal with an EF of 55-60%.  Left ventricular size is normal. Mild concentric wall thickening consistent  with left ventricular hypertrophy is present. Left ventricular diastolic  function is indeterminate.     Right Ventricle  Global right ventricular function is normal. The right ventricle is normal  size.     Atria  Both atria appear normal.     Mitral Valve  The mitral valve is normal. Mild mitral insufficiency is present.     Aortic Valve  Trileaflet aortic sclerosis without stenosis. Trace aortic insufficiency is  present.     Tricuspid Valve  The tricuspid valve is normal. Mild tricuspid insufficiency is present.  Pulmonary artery systolic pressure cannot be assessed.     Pulmonic Valve  The pulmonic valve is normal. Trace pulmonic insufficiency is present.     Vessels  The aorta root is normal. The thoracic aorta is normal. The pulmonary artery  cannot be assessed. The inferior vena cava cannot be assessed.     Pericardium  Trivial pericardial effusion is present. Chamber compression is not present;  there is no evidence for tamponade.     Compared to Previous Study  Previous study not available for comparison.  ______________________________________________________________________________  MMode/2D Measurements & Calculations     IVSd: 1.3 cm  LVIDd: 3.6 cm  LVIDs: 2.2 cm  LVPWd: 0.96 cm  FS: 38.9 %  LV mass(C)d: 132.1 grams  LV mass(C)dI: 97.5 grams/m2  asc Aorta Diam: 3.4 cm  LVOT diam: 2.0 cm  LVOT area: 3.1 cm2  RWT: 0.53  TAPSE: 2.0 cm     Doppler Measurements & Calculations  MV E max graciela: 36.8 cm/sec  MV A max graciela: 76.7 cm/sec  MV E/A: 0.48  MV dec slope: 259.3 cm/sec2  MV dec time: 0.14 sec  E/E' av.4  Lateral E/e': 6.8  Medial E/e': 10.1     ______________________________________________________________________________  Report approved by:  MD Tuan Adhikari 03/21/2023 09:41 AM         POC US Guidance Needle Placement    Impression    Left fascia iliaca nerve catheter placement.      Recent Labs   Lab 03/21/23  1751 03/21/23  1600 03/21/23  1419 03/21/23  1237 03/21/23  0848 03/20/23 2047 03/20/23  1652   WBC  --   --   --   --   --   --  10.2   HGB  --   --   --   --   --   --  11.4*   MCV  --   --   --   --   --   --  104*   PLT  --   --   --   --   --   --  210   INR  --   --   --   --   --   --  1.06   NA  --   --   --   --  137  --  140   POTASSIUM  --   --   --   --  3.7  --  4.0   CHLORIDE  --   --   --   --  101  --  107   CO2  --   --   --   --  24  --  20*   BUN  --   --   --   --  10.9  --  17.2   CR  --   --   --   --  0.67  --  0.78   ANIONGAP  --   --   --   --  12  --  13   MARY  --   --   --   --  8.8  --  9.1   GLC 98 127* 161*   < > 81   < > 192*   ALBUMIN  --   --   --   --   --   --  3.5   PROTTOTAL  --   --   --   --   --   --  6.0*   BILITOTAL  --   --   --   --   --   --  0.4   ALKPHOS  --   --   --   --   --   --  43   ALT  --   --   --   --   --   --  7*   AST  --   --   --   --   --   --  19   LIPASE  --   --   --   --   --   --  12*    < > = values in this interval not displayed.

## 2023-03-21 NOTE — PROGRESS NOTES
SURGICAL ICU PROGRESS NOTE  03/21/2023    Date of Service (when I saw the patient): 03/21/2023    ASSESSMENT:  This is an 88-year-old female with a PMHx of Stroke with left sided wekaness on Plavix, type 2 diabetes on insulin,  had a mechanical fall resulting in fracture of left femur also with a UTI admitted to the ICU for further monitoring and management. BP's stable overnight. No pressor needs.     Brief note to update plan for patient. Please see ICU H&P fated  3/21/23      CHANGES and MAJOR THINGS TODAY:   OR with orthopedics for repair of Left femoral neck fractures   Echo   Stop troponin trend   Stop Vancomycin as MRSA PCR negative.   Lactate now normal, will stop trending  Discussed with Trauma team.    Transfer out of ICU to [facility] floor       Rashad Bain PA-C

## 2023-03-21 NOTE — ANESTHESIA PROCEDURE NOTES
Fascia iliaca Procedure Note    Pre-Procedure   Staff -        Anesthesiologist:  Keyshawn Maharaj MD       Resident/Fellow: Cale Coronel MD       Performed By: resident and with residents       Procedure performed by resident/fellow/CRNA in presence of a teaching physician.         Location: ICU       Procedure Start/Stop Times: 3/21/2023 12:00 PM and 3/21/2023 12:32 PM       Pre-Anesthestic Checklist: patient identified, IV checked, site marked, risks and benefits discussed, informed consent, monitors and equipment checked, pre-op evaluation, at physician/surgeon's request and post-op pain management  Timeout:       Correct Patient: Yes        Correct Procedure: Yes        Correct Site: Yes        Correct Position: Yes        Correct Laterality: Yes        Site Marked: Yes  Procedure Documentation  Procedure: Fascia iliaca       Diagnosis: POSTOP PAIN       Laterality: left       Patient Position: supine       Patient Prep/Sterile Barriers: sterile gloves, mask       Skin prep: Chloraprep       Needle Gauge: 17.        Needle Length (millimeters): 90        Ultrasound guided       1. Ultrasound was used to identify targeted nerve, plexus, vascular marker, or fascial plane and place a needle adjacent to it in real-time.       2. Ultrasound was used to visualize the spread of anesthetic in close proximity to the above referenced structure.       3. A permanent image is entered into the patient's record.    Assessment/Narrative         The placement was negative for: blood aspirated, painful injection and site bleeding       Paresthesias: No.       Test dose of 3 mL lidocaine 1.5% w/ 1:200,000 epinephrine at 12:24 CDT.         Test dose negative, 3 minutes after injection, for signs of intravascular, subdural, or intrathecal injection.       Bolus given via catheter. no blood aspirated via catheter.        Secured via Tegaderm and Dermabond.        Insertion/Infusion Method: Continuous Infusion       Complications:  "none    Medication(s) Administered   Medication Administration Time: 3/21/2023 12:00 PM     Comments:  Routine left fascia iliaca catheter placement without complications. Patient tolerated well. Bolused catheter with 10cc of 0.25% ropivacaine.       FOR [facility] ([facility]) ONLY:   Pain Team Contact information: please page the Pain Team Via [employer].. Search \"Pain\". During daytime hours, please page the attending first. At night please page the resident first.    "

## 2023-03-21 NOTE — PROGRESS NOTES
Brief note:  Mrs. Kim is an 88yr old female who was admitted to the SICU with a right femur neck fracture and urosepsis with a presenting lactic of 4.9, managed with IVF resuscitation and antibiotics. She was stable and no longer required ICU cares after am rounds, pending floor transfer while awaiting surgical fixation.     [place] [facility] [place] with Dr. Paulino per discussions Orthopedic surgery. Medicine consult placed and discussed with Dr. Cody. Patient to remain [facility] [place], tentative admission to the Orthopedic unit, to be managed post op by the Hospitalist and Orthopedic Surgery teams.     Patients son and daughter in law are aware of the transfer.  Stable prior to [facility] [place] [facility].    Sean Enamorado CNP  Acute Care Trauma

## 2023-03-22 ENCOUNTER — APPOINTMENT (OUTPATIENT)
Dept: OCCUPATIONAL THERAPY | Facility: CLINIC | Age: 88
DRG: 521 | End: 2023-03-22
Payer: COMMERCIAL

## 2023-03-22 ENCOUNTER — APPOINTMENT (OUTPATIENT)
Dept: SPEECH THERAPY | Facility: CLINIC | Age: 88
DRG: 521 | End: 2023-03-22
Payer: COMMERCIAL

## 2023-03-22 LAB
ALLEN'S TEST: NORMAL
ANION GAP SERPL CALCULATED.3IONS-SCNC: 15 MMOL/L (ref 7–15)
ATRIAL RATE - MUSE: 80 BPM
BACTERIA UR CULT: ABNORMAL
BASE EXCESS BLDA CALC-SCNC: -1.2 MMOL/L (ref -9–1.8)
BUN SERPL-MCNC: 12.8 MG/DL (ref 8–23)
CALCIUM SERPL-MCNC: 8.1 MG/DL (ref 8.8–10.2)
CHLORIDE SERPL-SCNC: 99 MMOL/L (ref 98–107)
CREAT SERPL-MCNC: 0.66 MG/DL (ref 0.51–0.95)
DEPRECATED HCO3 PLAS-SCNC: 18 MMOL/L (ref 22–29)
DIASTOLIC BLOOD PRESSURE - MUSE: NORMAL MMHG
ERYTHROCYTE [DISTWIDTH] IN BLOOD BY AUTOMATED COUNT: 14 % (ref 10–15)
GFR SERPL CREATININE-BSD FRML MDRD: 84 ML/MIN/1.73M2
GLUCOSE BLDC GLUCOMTR-MCNC: 176 MG/DL (ref 70–99)
GLUCOSE BLDC GLUCOMTR-MCNC: 186 MG/DL (ref 70–99)
GLUCOSE BLDC GLUCOMTR-MCNC: 214 MG/DL (ref 70–99)
GLUCOSE BLDC GLUCOMTR-MCNC: 227 MG/DL (ref 70–99)
GLUCOSE BLDC GLUCOMTR-MCNC: 246 MG/DL (ref 70–99)
GLUCOSE BLDC GLUCOMTR-MCNC: 267 MG/DL (ref 70–99)
GLUCOSE SERPL-MCNC: 261 MG/DL (ref 70–99)
HCO3 BLD-SCNC: 23 MMOL/L (ref 21–28)
HCT VFR BLD AUTO: 29 % (ref 35–47)
HGB BLD-MCNC: 10.1 G/DL (ref 11.7–15.7)
INTERPRETATION ECG - MUSE: NORMAL
LACTATE SERPL-SCNC: 1.5 MMOL/L (ref 0.7–2)
MAGNESIUM SERPL-MCNC: 1.8 MG/DL (ref 1.7–2.3)
MCH RBC QN AUTO: 33.2 PG (ref 26.5–33)
MCHC RBC AUTO-ENTMCNC: 34.8 G/DL (ref 31.5–36.5)
MCV RBC AUTO: 95 FL (ref 78–100)
O2/TOTAL GAS SETTING VFR VENT: 21 %
P AXIS - MUSE: -6 DEGREES
PCO2 BLD: 35 MM HG (ref 35–45)
PH BLD: 7.43 [PH] (ref 7.35–7.45)
PHOSPHATE SERPL-MCNC: 3.6 MG/DL (ref 2.5–4.5)
PLATELET # BLD AUTO: 181 10E3/UL (ref 150–450)
PO2 BLD: 80 MM HG (ref 80–105)
POTASSIUM SERPL-SCNC: 3.8 MMOL/L (ref 3.4–5.3)
PR INTERVAL - MUSE: 224 MS
QRS DURATION - MUSE: 86 MS
QT - MUSE: 428 MS
QTC - MUSE: 493 MS
R AXIS - MUSE: 54 DEGREES
RBC # BLD AUTO: 3.04 10E6/UL (ref 3.8–5.2)
SODIUM SERPL-SCNC: 132 MMOL/L (ref 136–145)
SYSTOLIC BLOOD PRESSURE - MUSE: NORMAL MMHG
T AXIS - MUSE: 63 DEGREES
VENTRICULAR RATE- MUSE: 80 BPM
WBC # BLD AUTO: 12.8 10E3/UL (ref 4–11)

## 2023-03-22 PROCEDURE — 250N000013 HC RX MED GY IP 250 OP 250 PS 637: Performed by: SURGERY

## 2023-03-22 PROCEDURE — 250N000013 HC RX MED GY IP 250 OP 250 PS 637: Performed by: NURSE PRACTITIONER

## 2023-03-22 PROCEDURE — 250N000013 HC RX MED GY IP 250 OP 250 PS 637: Performed by: PHYSICIAN ASSISTANT

## 2023-03-22 PROCEDURE — 99231 SBSQ HOSP IP/OBS SF/LOW 25: CPT | Performed by: ANESTHESIOLOGY

## 2023-03-22 PROCEDURE — 250N000011 HC RX IP 250 OP 636: Performed by: NURSE PRACTITIONER

## 2023-03-22 PROCEDURE — 84100 ASSAY OF PHOSPHORUS: CPT | Performed by: PHYSICIAN ASSISTANT

## 2023-03-22 PROCEDURE — 97535 SELF CARE MNGMENT TRAINING: CPT | Mod: GO

## 2023-03-22 PROCEDURE — 250N000011 HC RX IP 250 OP 636: Performed by: SURGERY

## 2023-03-22 PROCEDURE — 99232 SBSQ HOSP IP/OBS MODERATE 35: CPT | Mod: 24 | Performed by: PHYSICIAN ASSISTANT

## 2023-03-22 PROCEDURE — 80048 BASIC METABOLIC PNL TOTAL CA: CPT

## 2023-03-22 PROCEDURE — 250N000011 HC RX IP 250 OP 636: Performed by: PHYSICIAN ASSISTANT

## 2023-03-22 PROCEDURE — 83605 ASSAY OF LACTIC ACID: CPT

## 2023-03-22 PROCEDURE — 250N000011 HC RX IP 250 OP 636: Performed by: STUDENT IN AN ORGANIZED HEALTH CARE EDUCATION/TRAINING PROGRAM

## 2023-03-22 PROCEDURE — 250N000011 HC RX IP 250 OP 636: Performed by: ANESTHESIOLOGY

## 2023-03-22 PROCEDURE — 92526 ORAL FUNCTION THERAPY: CPT | Mod: GN | Performed by: SPEECH-LANGUAGE PATHOLOGIST

## 2023-03-22 PROCEDURE — 92610 EVALUATE SWALLOWING FUNCTION: CPT | Mod: GN | Performed by: SPEECH-LANGUAGE PATHOLOGIST

## 2023-03-22 PROCEDURE — 83735 ASSAY OF MAGNESIUM: CPT | Performed by: ANESTHESIOLOGY

## 2023-03-22 PROCEDURE — 82803 BLOOD GASES ANY COMBINATION: CPT

## 2023-03-22 PROCEDURE — 85027 COMPLETE CBC AUTOMATED: CPT

## 2023-03-22 PROCEDURE — 250N000009 HC RX 250

## 2023-03-22 PROCEDURE — 250N000012 HC RX MED GY IP 250 OP 636 PS 637: Performed by: STUDENT IN AN ORGANIZED HEALTH CARE EDUCATION/TRAINING PROGRAM

## 2023-03-22 PROCEDURE — 99207 PR NO BILLABLE SERVICE THIS VISIT: CPT | Performed by: ANESTHESIOLOGY

## 2023-03-22 PROCEDURE — 97530 THERAPEUTIC ACTIVITIES: CPT | Mod: GO

## 2023-03-22 PROCEDURE — 120N000002 HC R&B MED SURG/OB UMMC

## 2023-03-22 PROCEDURE — 258N000003 HC RX IP 258 OP 636

## 2023-03-22 PROCEDURE — 97166 OT EVAL MOD COMPLEX 45 MIN: CPT | Mod: GO

## 2023-03-22 RX ORDER — AMOXICILLIN 250 MG
1 CAPSULE ORAL 2 TIMES DAILY
COMMUNITY

## 2023-03-22 RX ORDER — MIRTAZAPINE 7.5 MG/1
7.5 TABLET, FILM COATED ORAL AT BEDTIME
Status: DISCONTINUED | OUTPATIENT
Start: 2023-03-22 | End: 2023-03-31 | Stop reason: HOSPADM

## 2023-03-22 RX ORDER — AMLODIPINE BESYLATE 2.5 MG/1
2.5 TABLET ORAL DAILY
Status: DISCONTINUED | OUTPATIENT
Start: 2023-03-22 | End: 2023-03-22

## 2023-03-22 RX ORDER — SODIUM CHLORIDE, SODIUM LACTATE, POTASSIUM CHLORIDE, CALCIUM CHLORIDE 600; 310; 30; 20 MG/100ML; MG/100ML; MG/100ML; MG/100ML
INJECTION, SOLUTION INTRAVENOUS CONTINUOUS
Status: DISCONTINUED | OUTPATIENT
Start: 2023-03-22 | End: 2023-03-23

## 2023-03-22 RX ORDER — CHOLECALCIFEROL (VITAMIN D3) 50 MCG
1 TABLET ORAL DAILY
COMMUNITY

## 2023-03-22 RX ORDER — MAGNESIUM SULFATE HEPTAHYDRATE 40 MG/ML
2 INJECTION, SOLUTION INTRAVENOUS ONCE
Status: COMPLETED | OUTPATIENT
Start: 2023-03-22 | End: 2023-03-22

## 2023-03-22 RX ORDER — ENOXAPARIN SODIUM 100 MG/ML
30 INJECTION SUBCUTANEOUS EVERY 24 HOURS
Status: DISCONTINUED | OUTPATIENT
Start: 2023-03-22 | End: 2023-03-31 | Stop reason: HOSPADM

## 2023-03-22 RX ORDER — AMOXICILLIN 250 MG
1 CAPSULE ORAL 2 TIMES DAILY
Status: DISCONTINUED | OUTPATIENT
Start: 2023-03-22 | End: 2023-03-31 | Stop reason: HOSPADM

## 2023-03-22 RX ORDER — FEXOFENADINE HCL 180 MG/1
180 TABLET ORAL 2 TIMES DAILY
COMMUNITY

## 2023-03-22 RX ORDER — TRIAMCINOLONE ACETONIDE 1 MG/G
OINTMENT TOPICAL 2 TIMES DAILY PRN
COMMUNITY

## 2023-03-22 RX ORDER — MIRTAZAPINE 7.5 MG/1
7.5 TABLET, FILM COATED ORAL AT BEDTIME
Status: DISCONTINUED | OUTPATIENT
Start: 2023-03-22 | End: 2023-03-23

## 2023-03-22 RX ORDER — AMLODIPINE BESYLATE 2.5 MG/1
2.5 TABLET ORAL DAILY
Status: DISCONTINUED | OUTPATIENT
Start: 2023-03-23 | End: 2023-03-23

## 2023-03-22 RX ORDER — BENZOCAINE/MENTHOL 6 MG-10 MG
LOZENGE MUCOUS MEMBRANE 2 TIMES DAILY PRN
Status: DISCONTINUED | OUTPATIENT
Start: 2023-03-22 | End: 2023-03-31 | Stop reason: HOSPADM

## 2023-03-22 RX ADMIN — ROPIVACAINE HYDROCHLORIDE: 2 INJECTION, SOLUTION EPIDURAL; INFILTRATION at 03:00

## 2023-03-22 RX ADMIN — INSULIN ASPART 2 UNITS: 100 INJECTION, SOLUTION INTRAVENOUS; SUBCUTANEOUS at 17:37

## 2023-03-22 RX ADMIN — FEXOFENADINE HYDROCHLORIDE 90 MG: 30 SUSPENSION ORAL at 22:07

## 2023-03-22 RX ADMIN — CEFTRIAXONE SODIUM 1 G: 1 INJECTION, POWDER, FOR SOLUTION INTRAMUSCULAR; INTRAVENOUS at 14:35

## 2023-03-22 RX ADMIN — SODIUM CHLORIDE, POTASSIUM CHLORIDE, SODIUM LACTATE AND CALCIUM CHLORIDE: 600; 310; 30; 20 INJECTION, SOLUTION INTRAVENOUS at 17:59

## 2023-03-22 RX ADMIN — INSULIN ASPART 3 UNITS: 100 INJECTION, SOLUTION INTRAVENOUS; SUBCUTANEOUS at 09:04

## 2023-03-22 RX ADMIN — INSULIN ASPART 2 UNITS: 100 INJECTION, SOLUTION INTRAVENOUS; SUBCUTANEOUS at 12:38

## 2023-03-22 RX ADMIN — ATORVASTATIN CALCIUM 20 MG: 20 TABLET, FILM COATED ORAL at 12:08

## 2023-03-22 RX ADMIN — ACETAMINOPHEN 975 MG: 325 TABLET, FILM COATED ORAL at 22:05

## 2023-03-22 RX ADMIN — POLYETHYLENE GLYCOL 3350 17 G: 17 POWDER, FOR SOLUTION ORAL at 12:15

## 2023-03-22 RX ADMIN — CEFAZOLIN SODIUM 1 G: 1 INJECTION, POWDER, FOR SOLUTION INTRAMUSCULAR; INTRAVENOUS at 06:05

## 2023-03-22 RX ADMIN — MAGNESIUM SULFATE 2 G: 2 INJECTION INTRAVENOUS at 00:51

## 2023-03-22 RX ADMIN — SODIUM CHLORIDE, POTASSIUM CHLORIDE, SODIUM LACTATE AND CALCIUM CHLORIDE: 600; 310; 30; 20 INJECTION, SOLUTION INTRAVENOUS at 05:46

## 2023-03-22 RX ADMIN — INSULIN ASPART 1 UNITS: 100 INJECTION, SOLUTION INTRAVENOUS; SUBCUTANEOUS at 00:06

## 2023-03-22 RX ADMIN — INSULIN ASPART 4 UNITS: 100 INJECTION, SOLUTION INTRAVENOUS; SUBCUTANEOUS at 21:33

## 2023-03-22 RX ADMIN — HYDRALAZINE HYDROCHLORIDE 10 MG: 20 INJECTION INTRAMUSCULAR; INTRAVENOUS at 05:46

## 2023-03-22 RX ADMIN — SENNOSIDES AND DOCUSATE SODIUM 1 TABLET: 50; 8.6 TABLET ORAL at 12:08

## 2023-03-22 RX ADMIN — INSULIN ASPART 3 UNITS: 100 INJECTION, SOLUTION INTRAVENOUS; SUBCUTANEOUS at 04:04

## 2023-03-22 RX ADMIN — SENNOSIDES AND DOCUSATE SODIUM 1 TABLET: 50; 8.6 TABLET ORAL at 20:28

## 2023-03-22 RX ADMIN — ACETAMINOPHEN 975 MG: 325 TABLET, FILM COATED ORAL at 14:35

## 2023-03-22 RX ADMIN — MIRTAZAPINE 7.5 MG: 7.5 TABLET, FILM COATED ORAL at 21:37

## 2023-03-22 RX ADMIN — Medication 5 MG: at 20:28

## 2023-03-22 RX ADMIN — AMLODIPINE BESYLATE 2.5 MG: 2.5 TABLET ORAL at 12:08

## 2023-03-22 RX ADMIN — ENOXAPARIN SODIUM 30 MG: 30 INJECTION SUBCUTANEOUS at 17:20

## 2023-03-22 ASSESSMENT — ACTIVITIES OF DAILY LIVING (ADL)
ADLS_ACUITY_SCORE: 66
ADLS_ACUITY_SCORE: 66
ADLS_ACUITY_SCORE: 70
ADLS_ACUITY_SCORE: 66
ADLS_ACUITY_SCORE: 70
ADLS_ACUITY_SCORE: 66

## 2023-03-22 NOTE — ANESTHESIA PROCEDURE NOTES
Airway       Patient location during procedure: OR       Procedure Start/Stop Times: 3/21/2023 7:27 PM  Staff -        CRNA: Rehana Roque APRN CRNA       Performed By: CRNA  Consent for Airway        Urgency: elective  Indications and Patient Condition       Indications for airway management: ashley-procedural       Induction type:intravenous       Mask difficulty assessment: 1 - vent by mask    Final Airway Details       Final airway type: endotracheal airway       Successful airway: ETT - single  Endotracheal Airway Details        ETT size (mm): 6.5       Cuffed: yes       Successful intubation technique: video laryngoscopy       VL Blade Size: MAC 3       Grade View of Cords: 3       Adjucts: stylet       Measured from: lips       Secured at (cm): 21       Bite block used: None    Post intubation assessment        Placement verified by: capnometry        Number of attempts at approach: 1       Secured with: silk tape       Ease of procedure: easy       Dentition: Intact and Unchanged    Medication(s) Administered   Medication Administration Time: 3/21/2023 7:27 PM

## 2023-03-22 NOTE — PROGRESS NOTES
"CLINICAL SWALLOW EVALUATION     03/22/23 1368   Appointment Info   Signing Clinician's Name / Credentials (SLP) [facility]   General Information   Onset of Illness/Injury or Date of Surgery 03/20/23   Referring Physician Flynn Villavicencio, LINDA   Patient/Family Therapy Goal Statement (SLP) Patient is thirsty.   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 Suspicion for urosepsis given hypotension and tachycardia and UA positive for nitrites, leukocyte esterase and large amounts of WBCs. Patient transfer to [facility] for Left Hip Arthroplasty. Surgical procedure was uncomplicated and patient is in stable condition.\" HCT: No acute intracranial pathology.   General Observations Patient alert, cooperative, interactive. Professional  present entire session via Ipad. Patient's son and daughter in law arrived part-way through evaluation. RN reported no swallowing concerns with thickened liquids earlier today.       Present yes   Language Kiswahili   Type of Evaluation   Type of Evaluation Swallow Evaluation   Oral Motor   Oral Musculature unable to assess due to poor participation/comprehension  (difficulty following instructions)   Structural Abnormalities none present   Mucosal Quality dry   Dentition (Oral Motor)   Dentition (Oral Motor) natural dentition;adequate dentition   Facial Symmetry (Oral Motor)   Facial Symmetry (Oral Motor) WNL   Lip Function (Oral Motor)   Lip Range of Motion (Oral Motor) unable/difficult to assess   Tongue Function (Oral Motor)   Tongue ROM (Oral Motor) unable/difficult to assess   Jaw Function (Oral Motor)   Jaw Function (Oral Motor) WNL   Cough/Swallow/Gag " Reflex (Oral Motor)   Soft Palate/Velum (Oral Motor) unable/difficult to assess   Volitional Throat Clear/Cough (Oral Motor) unable/difficult to assess   Volitional Swallow (Oral Motor) unable/difficult to assess   Vocal Quality/Secretion Management (Oral Motor)   Vocal Quality (Oral Motor) hypophonic;breathy;hoarse  (mild breathy/hoarse vocal quality)   Secretion Management (Oral Motor) WNL   General Swallowing Observations   Past History of Dysphagia Remote hx of video swallow study 11/14/2011 at OSH which revealed flash laryngeal penetration of thin liquids. Patient used natural chin tuck posture. Soft and bite sized textures and thin liquids recommended at that time. Patient's family reported patient gradually has had increased coughing with thin liquids and when using straw. Patient's family has been thickening liquids to slightly thick consistency at home. They prepare soft/bite sized foods (e.g. rice, vegetable soup).   Respiratory Support (General Swallowing Observations) nasal cannula   Current Diet/Method of Nutritional Intake (General Swallowing Observations, NIS) clear liquid diet;thin liquids (level 0)   Swallowing Evaluation Clinical swallow evaluation   Clinical Swallow Evaluation   Feeding Assistance minimal assistance required   Clinical Swallow Evaluation Textures Trialed thin liquids;slightly thick liquids;mildly thick liquids;pureed;minced & moist;soft & bite-sized   Clinical Swallow Eval: Thin Liquid Texture Trial   Mode of Presentation, Thin Liquids spoon;cup;straw;fed by clinician;self-fed   Volume of Liquid or Food Presented 2-3 oz thin water   Oral Phase of Swallow premature pharyngeal entry  (suspected; reduced oral bolus acceptance from cup)   Pharyngeal Phase of Swallow coughing/choking  (intermittent; especially with large bolus size)   Clinical Swallow Eval: Slightly Thick Liquids   Mode of Presentation cup;self-fed   Volume Presented 3 oz   Oral Phase WFL   Pharyngeal Phase   (no overt  aspiration signs)   Clinical Swallow Eval: Mildly Thick Liquids   Mode of Presentation cup;straw;self-fed;fed by clinician   Volume Presented 3 oz   Oral Phase WFL   Pharyngeal Phase   (no overt aspiration signs)   Clinical Swallow Evaluation: Puree Solid Texture Trial   Mode of Presentation, Puree spoon;self-fed;fed by clinician  (given loaded spoon)   Volume of Puree Presented 2 oz   Oral Phase, Puree WFL   Pharyngeal Phase, Puree   (no overt aspiration signs)   Clinical Swallow Eval: Minced & Moist   Mode of Presentation spoon;fed by clinician   Volume Presented 4 bites   Oral Phase WFL   Pharyngeal Phase   (no overt aspiration signs)   Clinical Swallow Eval: Soft & Bite Sized   Mode of Presentation spoon;fed by clinician   Volume Presented 5 bites   Oral Phase WFL   Pharyngeal Phase   (no overt aspiration signs)   Esophageal Phase of Swallow   Patient reports or presents with symptoms of esophageal dysphagia No   Swallowing Recommendations   Diet Consistency Recommendations soft & bite-sized (level 6);slightly thick liquids (level 1)   Supervision Level for Intake 1:1 supervision needed   Mode of Delivery Recommendations bolus size, small;no straws;slow rate of intake  (use small size cup to improve safety)   Swallowing Maneuver Recommendations alternate food and liquid intake   Monitoring/Assistance Required (Eating/Swallowing) stop eating activities when fatigue is present;monitor for cough or change in vocal quality with intake   Recommended Feeding/Eating Techniques (Swallow Eval) maintain upright sitting position for eating;minimize distractions during oral intake;provide assist with feeding;set-up and prepare tray   Medication Administration Recommendations, Swallowing (SLP) Crushed with applesauce (per patient/family practice at home).   Instrumental Assessment Recommendations instrumental evaluation not recommended at this time   General Therapy Interventions   Planned Therapy Interventions Dysphagia  Treatment   Dysphagia treatment Modified diet education;Instruction of safe swallow strategies   Clinical Impression   Criteria for Skilled Therapeutic Interventions Met (SLP Eval) Yes, treatment indicated   SLP Diagnosis Dysphagia   Risks & Benefits of therapy have been explained evaluation/treatment results reviewed;care plan/treatment goals reviewed;risks/benefits reviewed;current/potential barriers reviewed;participants voiced agreement with care plan;participants included;patient;son  (and daughter in law)   Clinical Impression Comments Mild oropharyngeal dysphagia characterized by reduced oral bolus acceptance at times, reduced oral bolus control with thin liquids, delayed appearing swallow response with larger sips thin liquid, and intermittent coughing after sips of thin liquid (especially larger bolus size). No overt aspiration signs occurred with slightly thick, mildly thick liquid, puree, minced/moist and soft/bite sized textures. Adequate mastication of solids and no oral residue remained. Patient needed set up and minimal feeding assist due to weakness today. Patient will need supervision for safety to reinforce swallow strategies. Recommend soft/bite sized textures and slightly thick liquids (IDDSI 6, 1) given 1:1 supervision/assist and swallow strategies (fully alert and upright position at 90 degrees, no straws, small bites/sips, slow pace, use small size cup). Please crush pills with applesauce. SLP to follow for diet tolerance and swallow strategy training.   SLP Total Evaluation Time   Eval: oral/pharyngeal swallow function, clinical swallow Minutes (81850) 20   SLP Goals   Therapy Frequency (SLP Eval) 5 times/wk   SLP Predicted Duration/Target Date for Goal Attainment 04/19/23   SLP Goals Swallow   SLP: Safely tolerate diet without signs/symptoms of aspiration Soft & bite sized diet;Slightly thick liquids;With use of swallow precautions;With assistance/supervision   Interventions   Interventions  Quick Adds Swallowing Dysfunction   Swallowing Dysfunction &/or Oral Function for Feeding   Treatment of Swallowing Dysfunction &/or Oral Function for Feeding Minutes (32985) 10   Symptoms Noted During/After Treatment None   Treatment Detail/Skilled Intervention Provided education about diet modifications and swallow strategies.   SLP Discharge Planning   SLP Plan diet f/u x1-2; swallow strategy training; current diet is baseline per family   SLP Discharge Recommendation home with assist   SLP Rationale for DC Rec Do not anticipate SLP needs at discharge.   SLP Brief overview of current status  Recommend soft/bite sized textures and slightly thick liquids (IDDSI 6, 1) given 1:1 supervision/assist and swallow strategies (fully alert and upright position at 90 degrees, no straws, small bites/sips, slow pace, use small size cup). Please crush pills with applesauce. SLP to follow for diet tolerance and swallow strategy training. Patient's family instructed to check foods brought from home with RN to ensure proper consistency.   Total Session Time   Total Session Time (sum of timed and untimed services) 30

## 2023-03-22 NOTE — PROGRESS NOTES
Patient alert, oriented to self, confused (Romanian  used) but remains pleasant. She denies pain, able to follow commands on BLE (wiggled toes). Ropivacaine boluses infusing in never block catheter on left hip.    -160a, PRN Hydralazine given, effective. NSR on telemetry, no edema noted.    She remains on 2lpm of O2 via NC, desaturates to 85% on RA, LS clear, denies SOB, no coughing noted.    Remains NPO pending SLP evaluation. D5NS infusing, BG elevated did receive sliding scale coverage. KITA notified and changed IVF to LR.    Modi catheter present, positional, UO minimal 20-35/hr, KITA aware.    Abduction pillow on, patient confused and unable to follow instructions.    Electrolytes replaced.     Nonblanchable/ discolored skin on right buttocks and coccyx, mepilex applied.    Will continue with plan of care.

## 2023-03-22 NOTE — PROGRESS NOTES
"Brief acceptance note:    88 year old Sinhala speaking female with a medical history significant for 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 Suspicion for urosepsis given hypotension and tachycardia and UA positive for nitrites, leukocyte esterase and large amounts of WBCs. Patient transfer to [facility] for [facility] Arthroplasty. Surgical procedure was uncomplicated.   Patient is now being transferred to the medical floor for ongoing management      Briefly interviewed and examined the patient  Son and daughter-in-law in room  Patient currently denies any chest pain shortness of breath or abdominal  No acute issues    Vital signs:    Blood pressure 126/64, pulse 105, temperature 97.9  F (36.6  C), temperature source Oral, resp. rate 10, height 1.575 m (5' 2.01\"), weight 40.8 kg (89 lb 15.2 oz), SpO2 100 %.  Estimated body mass index is 16.45 kg/m  as calculated from the following:    Height as of this encounter: 1.575 m (5' 2.01\").    Weight as of this encounter: 40.8 kg (89 lb 15.2 oz).   Alert awake and oriented  S1-S2 normal  Bilateral clear to auscultation  Soft nontender    Assessment and plan    Status post left hemiarthroplasty for left femur fracture secondary to the fall  Continue current plan of care      "

## 2023-03-22 NOTE — BRIEF OP NOTE
[facility]    Brief Operative Note    Pre-operative diagnosis: Hip fracture (H) [S72.009A]  Post-operative diagnosis Same as pre-operative diagnosis    Procedure: Procedure(s):  Hemiarthroplasty Left Hip  Surgeon: Surgeon(s) and Role:     * Jeffrey Paulino MD - Primary     * Rosa Ernandez MD - Resident - Assisting  Anesthesia: General   Estimated Blood Loss: 100cc    Drains: None  Specimens: * No specimens in log *  Findings:   None.  Complications: None.  Implants:   Implant Name Type Inv. Item Serial No.  Lot No. LRB No. Used Action   BONE CEMENT SIMPLEX FULL DOSE 6191-1-001 - [id number] Cement, Bone BONE CEMENT SIMPLEX FULL DOSE 6191-1-001  [employer] [id number] Left 1 Implanted   BONE CEMENT SIMPLEX FULL DOSE 6191-1-001 - [id number] Cement, Bone BONE CEMENT SIMPLEX FULL DOSE 6191-1-001  [employer] [id number] Left 1 Implanted   BONE CEMENT RESTRICTOR CHEN FEMORAL 18.5MM 136108 - [id number] Cement, Bone BONE CEMENT RESTRICTOR CHEN FEMORAL 18.5MM [id number]  [employer] [id number] Left 1 Implanted   IMP STEM COMP STRK HIP 132DEG #6 6070-0625A - [id number] Total Joint Component/Insert IMP STEM COMP STRK HIP 132DEG #6 6070-0625A  [employer] [id number] Left 1 Implanted   IMP SPACER OSTEONICS DISTAL CEMENT 11MM 9354-1912 - [id number] Metallic Hardware/[place] IMP SPACER OSTEONICS DISTAL CEMENT 11MM 3683-3724  [employer] [id number] Left 1 Implanted   IMP HEAD STRK FEMORAL C-TAPER COCR LFIT 28MM +10MM  - [id number] Total Joint Component/Insert IMP HEAD STRK FEMORAL C-TAPER COCR LFIT 28MM +10MM   [employer] [id number] Left 1 Implanted   IMP HEAD STRK FEMORAL UHR BIPOLAR [id number] UH1-44-28 - [id number] Total Joint Component/Insert IMP HEAD STRK FEMORAL UHR BIPOLAR [id number] UH1-44-28  [employer] [id number] Left 1 Implanted       Assessment and Plan: Julio Grier is a 88 year old female now s/p L hip  hemiarthroplasty on 3/21 with Dr. Paulino.     Medicine Primary  Activity: Up with assist until independent. Posterior hip precautions   Weight bearing status: WBAT  Pain management: Transition from IV to PO as tolerated.    Diet: Begin with clear fluids and progress diet as tolerated.   DVT prophylaxis: lovenox and mechanical while in the hospital, discharge on lovenox x 4 weeks.  Imaging: AP/Lat L hip in PACU   Labs: Hgb POD#1.  Bracing/Splinting: Abduction pillow at rest and if confused   Dressings: Keep clean, dry and intact x 7 days.   Elevation: Elevate LLE on pillows to keep above the level of the heart as much as possible.   Physical Therapy/Occupational Therapy: Eval and treat.    Follow-up: Clinic with wound nurse in 2 weeks for suture removal no x-rays needed.   Disposition: Pending progress with therapies, pain control on orals, and medical stability, anticipate discharge to home on POD #2-3.    Rosa Ernandez MD  Orthopaedic Surgery Resident, PGY-4  Pager: [phone]

## 2023-03-22 NOTE — PROGRESS NOTES
"Orthopaedic Surgery Progress Note     Subjective: No acute events overnight. Pain well controlled. Tolerating PO. Voiding via sy. +Flatus, no BM.     In ICU for observation ON. Likely transfer to floor today.     Objective: BP (!) 161/65   Pulse 78   Temp 97.9  F (36.6  C) (Oral)   Resp (!) 6   Ht 1.575 m (5' 2.01\")   Wt 40.8 kg (89 lb 15.2 oz)   SpO2 99%   BMI 16.45 kg/m      General: NAD, alert and oriented, cooperative with exam.   Cardio: RRR, extremities wwp.   Respiratory: Non-labored breathing.  MSK:   - LLE: Dressing c/d/i. Wiggles toes. Exam limited by no      Labs:  Hemoglobin   Date Value Ref Range Status   03/22/2023 10.1 (L) 11.7 - 15.7 g/dL Final   01/01/2013 13.1 11.7 - 15.7 g/dL Final   ]  All cultures:  No results for input(s): CULT in the last 168 hours.    Assessment and Plan: Julio Grier is a 88 year old female now s/p L hip hemiarthroplasty on 3/21 with Dr. Paulino.      Medicine Primary  Activity: Up with assist until independent. Posterior hip precautions   Weight bearing status: WBAT  Pain management: Transition from IV to PO as tolerated.    Diet: Begin with clear fluids and progress diet as tolerated.   DVT prophylaxis: lovenox and mechanical while in the hospital, discharge on lovenox x 4 weeks.  Imaging: AP/Lat L hip in PACU   Labs: Hgb POD#1.  Bracing/Splinting: Abduction pillow at rest and if confused   Dressings: Keep clean, dry and intact x 7 days.   Elevation: Elevate LLE on pillows to keep above the level of the heart as much as possible.   Physical Therapy/Occupational Therapy: Eval and treat.     Follow-up: Clinic with wound nurse in 2 weeks for suture removal no x-rays needed.   Disposition: Pending progress with therapies, pain control on orals, and medical stability, anticipate discharge to home on POD #2-3.     Rosa Ernandez MD  Orthopaedic Surgery Resident, PGY-4    "

## 2023-03-22 NOTE — PHARMACY-ADMISSION MEDICATION HISTORY
Admission Medication History Completed by Pharmacy    See [facility] Admission Navigator for allergy information, preferred outpatient pharmacy, prior to admission medications and immunization status.     Medication History Sources:     Patient's son who has pt's medication list    Medication fill history    Changes made to PTA medication list (reason):    Added: Insulin aspart, fexofenadine, Senna-docusate, triamcinolone 0.1% ointment, and senna-docusate     Deleted: hypromellose-dextran ophthalmic solution, Calcium/vit D, ciprofloxacin, fish oil, fluocinonide ointment, glipizide, hydroxyzine, oxybutynin, simvastatin, betamethasone cream- not taking per family    Changed: None    Additional Information:    Insulin Aspart: Pt's on is not sure about the doses since it changes depending on blood glucose readings.    Prior to Admission medications    Medication Sig Last Dose Taking? Auth Provider Long Term End Date   amLODIPine (NORVASC) 2.5 MG tablet Take 1 tablet by mouth daily  Yes Reported, Patient Yes    atorvastatin (LIPITOR) 20 MG tablet Take 1 tablet by mouth every evening  Yes Reported, Patient Yes    clopidogrel (PLAVIX) 75 MG tablet Take 75 mg by mouth daily  Yes Reported, Patient Yes    fexofenadine (ALLEGRA) 180 MG tablet Take 180 mg by mouth 2 times daily  Yes Unknown, Entered By History     insulin aspart (NOVOLOG PEN) 100 UNIT/ML pen Inject Subcutaneous 3 times daily (with meals) 2-3 times a day before meals.  Yes Unknown, Entered By History     LANTUS SOLOSTAR 100 UNIT/ML soln INJECT 40 UNITS IN MORNING AND 32 UNITS IN EVENING  Yes Reported, Patient Yes    metformin (GLUCOPHAGE) 1000 MG tablet Take 1 tablet by mouth every 12 hours.  Yes Reported, Patient     mirtazapine (REMERON) 7.5 MG tablet TAKE 1 TABLET BY MOUTH EVERYDAY AT BEDTIME  Yes Reported, Patient Yes    senna-docusate (SENOKOT-S/PERICOLACE) 8.6-50 MG tablet Take 1 tablet by mouth 2 times daily  Yes Unknown, Entered By History     triamcinolone  (KENALOG) 0.1 % external ointment Apply topically 2 times daily as needed for irritation (rash and itching)  Yes Unknown, Entered By History     vitamin D3 (CHOLECALCIFEROL) 50 mcg (2000 units) tablet Take 1 tablet by mouth daily  Yes Unknown, Entered By History         Date completed: 03/22/23    Medication history completed by: [facility]

## 2023-03-22 NOTE — PROGRESS NOTES
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 Suspicion for urosepsis given hypotension and tachycardia and UA positive for nitrites, leukocyte esterase and large amounts of WBCs. [place] [facility] [place]. Surgical procedure was uncomplicated and patient is in stable condition.     CHANGES and MAJOR THINGS TODAY:   Add bowel regimen   Resume amlodipine 2.5mg daily   acapella  CLD, SLP evalautaion   High intensity sliding scale   WBAT with posterior hip precautions   Follow UC results   Remove arterial line  Conferred with anesthesia staff, kathy for Lovenox for DVT prophylaxis   Recheck lactate    Transfer to floor no icu indcations     PLAN:    Neurological:  # acute post operative pain   - cont with scheduled tylenol.   - decrease opioids to 2.5-5.0 range of oxycodone   - Anesthesia pain to manage L fascia iliac cathter    # H/o CVA c/b Hemiparesis and Hemiplegia  - Noted some h/o dysphagia in the chart.   - consult  SLP to see.   - Aspiration precautions for now. Clear liquid diet until SLP can evaluate.   - PTA Plavix 75mg daily on hold; resume at surgery discretion   - Maintain circadian rhythm. Lights on during the day. Off at night, minimize cares at night.  OOB during the day.  - cont with mirtazapine at bedtime     Pulmonary:  # no acute issues   - acapella for aggressive pulmonary toileting     Cardiovascular:    # hypertension   # hyperlipidemia   - resume  Amlodipine   - remove arterial line.   - hold fish oil  - continue PTA Lipitor 20mg daily    GI/Nutrition:    # no acute issues   - CLD   - SLP For diet evaluation      Fluids/Electrolytes:  # hyponatremia  # hypomagnesia   - monitor for now   - Cont LR  for IV fluid hydration.   - electrolyte replacement protocol  In place.     Renal:   #  Hx of urinary retention  - hold oxybutynin for now     Endocrine:  # Stress hyperglycemia superimposed on   # Diabetes Mellitus type II   - PTA medications: glipizide, metformin, lantus 40/32  - Increase sliding glucose management to high intensity.  - Goal to keep BG < 180 for optimal wound healing     Infectious disease:   # UTI  # Concern for Urosepsis at time of admit with elevated lactate, initially 4.9  received IV Vancomycin and Cefepime x 1. Lactate initially 4.9-->1.1   - 3/20 UA: pyuria, +nitrates. UC pending.   - 3/20 BC:  NGTD   - Continue Rocephin    Antibiotics:  -  Ceftriaxone 3/22     Hematology:    # Acute blood loss anemia due to fracture and  surgical blood loss   - Threshold for transfusion if hgb <7.0 or signs/symptoms of hypoperfusion.     - EBL was reported at 100cc  - Hgb 10. Admit hgb13.0. monitor for now     Musculoskeletal:  # Left femoral neck fracture due to mechanical fall   # S/p LEFT hip hemiarthroplasty on 3/21  # left hemiparesis due to stroke/CVA    - Due to hx of CVA, does stand and transfer   - Regional anesthesia placed left iliac fascia block at [place].  - Head CT negative for trauma x 2. trauma eval completed prior to transfer to [facility]   - Fall precautions  - PT/OT   - Posterior hip precautions. WBAT. Up with assist     General Cares/Prophylaxis:    DVT Prophylaxis: Defer to primary service, confer with orthopedics re lovenox/heparin   GI Prophylaxis: Not indicated  Restraints: Restraints for medical healing needed: NO    Lines/ tubes/ drains:  - PIV's  - sy  -     Disposition:  - transfer to floor, no ICU needs       Time spent on this Encounter   Billing:  I spent 40 minutes bedside and on the inpatient unit today managing the  care of Julio Grier in relation to the issues listed in this  note.      Rashad Bain PA-C    ====================================  INTERVAL HISTORY: Course reviewed. No acute events. This am, pt reports pain in left thigh/groin. Asking for water, reports she is thirsty and would like to drink water.  Interview difficult even with professional  via Ipad, per  sometimes answers questions but often repeats questions or statements.     OBJECTIVE:   1. VITAL SIGNS:   Temp:  [97  F (36.1  C)-98.2  F (36.8  C)] 97.9  F (36.6  C)  Pulse:  [] 103  Resp:  [5-26] 15  BP: (126-177)/(65-85) 161/65  MAP:  [75 mmHg-209 mmHg] 79 mmHg  Arterial Line BP: (125-183)/(50-73) 136/50  SpO2:  [96 %-100 %] 99 %  Resp: 15    2. INTAKE/ OUTPUT:   I/O last 3 completed shifts:  In: 2306.75 [P.O.:118; I.V.:2188.75]  Out: 1235 [Urine:1135; Blood:100]    3. PHYSICAL EXAMINATION:  General: laying in bed, awake, alert, follow simple commands when asked   HEENT: pupils 3mm and reactive   Neuro: A&O, orientated to self. Left side hemiparesis with decreased tone/ROM and strength  Pulm/Resp: Clear breath sounds bilaterally without rhonchi, crackles or wheeze, breathing non-labored  CV: RRR, S1/S2  Abdomen: abdomen soft and compressible.    : (+) sy catheter in place, urine yellow and clear  Incisions/Skin: left thigh dressed.  MSK/Extremities: left thigh dressed, thigh soft and compressible, pink wedge pillow in place. Pulses 2+    4. INVESTIGATIONS:   Arterial Blood Gases   No lab results found in last 7 days.  Complete Blood Count   Recent Labs   Lab 03/22/23  0416 03/21/23  2203 03/20/23  1652   WBC 12.8*  --  10.2   HGB 10.1* 10.3* 11.4*     --  210     Basic Metabolic Panel  Recent Labs   Lab 03/22/23  0759 03/22/23  0410 03/22/23  0409 03/22/23  0311 03/21/23  2316 03/21/23  2204 03/21/23  1237 03/21/23  0848 03/20/23  2047 03/20/23  1652   NA  --  132*  --   --   --  135*  --  137  --  140   POTASSIUM  --  3.8  --   --   --  3.6  --  3.7  --  4.0   CHLORIDE  --  99   --   --   --  100  --  101  --  107   CO2  --  18*  --   --   --  23  --  24  --  20*   BUN  --  12.8  --   --   --  10.2  --  10.9  --  17.2   CR  --  0.66  --   --   --  0.67  --  0.67  --  0.78   * 261* 267* 246*   < > 166*   < > 81   < > 192*    < > = values in this interval not displayed.     Liver Function Tests  Recent Labs   Lab 23  1652   AST 19   ALT 7*   ALKPHOS 43   BILITOTAL 0.4   ALBUMIN 3.5   INR 1.06     Pancreatic Enzymes  Recent Labs   Lab 23  1652   LIPASE 12*     Coagulation Profile  Recent Labs   Lab 23  165   INR 1.06     5. RADIOLOGY:   Recent Results (from the past 24 hour(s))   Echo Complete   Result Value    LVEF  55-60%    Narrative    [id number]  [id number]  [id number]  [id number]^TINA^ERASTO^BETHANY     [facility]  Echocardiography Laboratory  [address] [place] [id number]     Name: ERIKA SERRANO  MRN: [id number]  : 1934  Study Date: 2023 07:41 AM  Age: 88 yrs  Gender: Female  Patient Location: [facility]  Reason For Study: Syncope and Collapse  Ordering Physician: ERASTO WILDER  Performed By: Zandra Lawson     BSA: 1.4 m2  Height: 62 in  Weight: 89 lb  HR: 81  BP: 127/71 mmHg  ______________________________________________________________________________  Procedure  Echocardiogram with two-dimensional, color and spectral Doppler performed.  ______________________________________________________________________________  Interpretation Summary  Global and regional left ventricular function is normal with an EF of 55-60%.  Mild concentric wall thickening consistent with left ventricular hypertrophy  is present.  Global right ventricular function is normal. The right ventricle is normal  size.  Mild mitral insufficiency is present.  Mild tricuspid insufficiency is present.  The inferior vena cava cannot be assessed.  Trivial pericardial effusion is present.  Chamber compression is not present; there is no  evidence for tamponade.  ______________________________________________________________________________  Left Ventricle  Global and regional left ventricular function is normal with an EF of 55-60%.  Left ventricular size is normal. Mild concentric wall thickening consistent  with left ventricular hypertrophy is present. Left ventricular diastolic  function is indeterminate.     Right Ventricle  Global right ventricular function is normal. The right ventricle is normal  size.     Atria  Both atria appear normal.     Mitral Valve  The mitral valve is normal. Mild mitral insufficiency is present.     Aortic Valve  Trileaflet aortic sclerosis without stenosis. Trace aortic insufficiency is  present.     Tricuspid Valve  The tricuspid valve is normal. Mild tricuspid insufficiency is present.  Pulmonary artery systolic pressure cannot be assessed.     Pulmonic Valve  The pulmonic valve is normal. Trace pulmonic insufficiency is present.     Vessels  The aorta root is normal. The thoracic aorta is normal. The pulmonary artery  cannot be assessed. The inferior vena cava cannot be assessed.     Pericardium  Trivial pericardial effusion is present. Chamber compression is not present;  there is no evidence for tamponade.     Compared to Previous Study  Previous study not available for comparison.  ______________________________________________________________________________  MMode/2D Measurements & Calculations     IVSd: 1.3 cm  LVIDd: 3.6 cm  LVIDs: 2.2 cm  LVPWd: 0.96 cm  FS: 38.9 %  LV mass(C)d: 132.1 grams  LV mass(C)dI: 97.5 grams/m2  asc Aorta Diam: 3.4 cm  LVOT diam: 2.0 cm  LVOT area: 3.1 cm2  RWT: 0.53  TAPSE: 2.0 cm     Doppler Measurements & Calculations  MV E max graciela: 36.8 cm/sec  MV A max graciela: 76.7 cm/sec  MV E/A: 0.48  MV dec slope: 259.3 cm/sec2  MV dec time: 0.14 sec  E/E' av.4  Lateral E/e': 6.8  Medial E/e': 10.1     ______________________________________________________________________________  Report  approved by: MD Tuan Adhikari 03/21/2023 09:41 AM         POC US Guidance Needle Placement    Impression    Left fascia iliaca nerve catheter placement.    XR Surgery SUREKHA L/T 5 Min Fluoro    Narrative    This exam was marked as non-reportable because it will not be read by a   radiologist or a [place] non-radiologist provider.         XR Hip Left 2-3 Views    Narrative    EXAM: PELVIS AND LEFT HIP 2 VIEWS  LOCATION: [facility]  DATE/TIME: 3/21/2023 10:27 PM    INDICATION: Status post left hip hemiarthroplasty.  COMPARISON: 03/20/2023 - Left femur radiographs.      Impression    IMPRESSION:   1. Evidence of recent left hip surgery.  2. A left hip arthroplasty is in place. No evidence of hardware failure.  3. No visualized acute fracture or malalignment of the pelvis.       =========================================

## 2023-03-22 NOTE — ANESTHESIA PROCEDURE NOTES
Arterial Line Procedure Note    Pre-Procedure   Staff -        Anesthesiologist:  Sara Nguyen MD       Performed By: anesthesiologist       Location: OR       Pre-Anesthestic Checklist: patient identified, IV checked, risks and benefits discussed, informed consent, monitors and equipment checked, pre-op evaluation and at physician/surgeon's request  Timeout:       Correct Patient: Yes        Correct Procedure: Yes        Correct Site: Yes        Correct Position: Yes   Line Placement:   This line was placed Post Induction  Procedure   Procedure: arterial line       Diagnosis: hypertension, femur fracture       Laterality: left       Insertion Site: radial.  Sterile Prep        Standard elements of sterile barrier followed       Skin prep: Chloraprep  Insertion/Injection        Technique: ultrasound guided        1. Ultrasound was used to evaluate the access site.       2. Artery evaluated via ultrasound for patency/adequacy.       3. Using real-time ultrasound the needle/catheter was observed entering the artery/vein.       Catheter Type/Size: 20 G, 1.75 in/4.5 cm quick cath (integral wire)  Narrative         Secured by: suture       Tegaderm dressing used.       Complications: None apparent,        Arterial waveform: Yes        IBP within 10% of NIBP: Yes

## 2023-03-22 NOTE — PROGRESS NOTES
Brief ICU Crosscover Note    Assessment: Julio Grier is an 88 year old Wolof speaking female with a medical history significant for CVA with left sided hemiplegia on Plavix, prior UTI's, DM2, who was found down by her family after falling off the commode. Found to have a left femoral neck fracture on imaging. Suspicion for urosepsis given hypotension and tachycardia and UA positive for nitrites, leukocyte esterase and large amounts of WBCs. She was started on broad spectrum antibiotics. Patient was evaluated by Dr. Paulino with Orthopedic Surgery with plan to transfer to [facility] [place] [place] for Left Hip Arthroplasty. Surgical procedure was uncomplicated and patient is in stable condition.     Plan is for patient to spend the night in the ICU per Dr. Paulino and Dr. Mullen discussion earlier today. Upon arrival patient was alert and interactive, family was at bedside. Patient had stable BP and HR and was not requiring pressor medications. Patient was on 4 LPM of supplemental oxygen with good O2 saturation, lung sounds were clear with good air movement. Left hip incision with scant and unchanged bloody drainage present on dressing which was clean and intact. Pulses were palpable and equal in both LE which were cool to the touch.     Plan:  ~ Admit to ICU overnight for close monitoring  ~ EtCO2 monitoring overnight  ~ Monitor incision and dressing for drainage  ~ BMP and CBC pending upon arrival  ~ Connecting ropivicaine infusion to catheter in left hip to assist with pain  ~ Family updated at bedside      TERESA [facility]  Pager #[phone]  Critical Care  [facility]

## 2023-03-22 NOTE — OP NOTE
Procedure Date: 03/21/2023    PREOPERATIVE DIAGNOSIS:  Left femoral neck fracture.    POSTOPERATIVE DIAGNOSIS:  Left femoral neck fracture.    PROCEDURE:  Left hip hemiarthroplasty with Winston components cemented with the femoral component #6 with a +10 neck and a 44 mm head.    SURGEON:  Jeffrey Paulino MD    ASSISTANT:  Rosa Ernandez, PGY-4, Orthopedic Resident.    COMPLICATIONS:  None.    DRAINS:  None.    ESTIMATED BLOOD LOSS:  100 mL.    ANESTHESIA:  General endotracheal.    INDICATIONS FOR PROCEDURE:  Please refer to hospital chart for further details discussing indications of Mrs. Grier's case.    DESCRIPTION OF PROCEDURE:  On 03/21/2023, the patient was taken to surgery.  Preoperative antibiotics were administered to the patient prior to arrival to the OR.    After successful induction of general endotracheal anesthesia, she was placed in right lateral decubitus on the operating table.  The left lower extremity was prepped and draped in sterile fashion.    The pause for the cause was performed according to institution's policy, which confirmed laterality of the procedure.    An incision was made on the proximal portion of the femur with a slightly posterior approach.  Subcutaneous tissues were dissected.  The IT band was incised and we proceeded with identification of the greater trochanteric bursa, which was excised as well.    We proceeded with detachment of the short external rotators and capsule in one single flap and eventually this was tagged with a #2 FiberWire suture.    We proceeded with placement of the femoral neck using the cutting guide, which was seen to be approximately a fingerbreadth above the lesser trochanter.    Following this, we proceeded with extraction of the femoral head, which was done without any complications and it was estimated to measure 44 mm in diameter.  Inspection of the acetabulum confirmed to have significant arthritic changes, which was already suspected based on  preoperative x-rays.    The acetabulum was packed with a sponge.  We proceeded with reaming of the canal and we concluded to place a #6 femoral component.    A canal plug was placed.  Copious irrigation was applied with a Pulsavac lavage and eventually we proceeded with placement of the femoral component by mixing 2 batches of cement without antibiotics.  Special attention was placed to maintain the proper anteversion in the component.    A trial of further stability was performed and we concluded the patient will tolerate quite well a +10 femoral neck with again a 44 mm femoral head.  The patient presented with excellent tension of the quadriceps complex with the hip in full extension and presented with instability proximally subluxing around 40 degrees of internal rotation with the hip flexed at 90 degrees.    Of note, we proceeded with taking a single AP x-ray with the broach in place in order to confirm excellent alignment of the broach with respect to the femur.  This image was sent to PACS for definitive documentation.    Copious irrigation was applied.  The capsule, which was tagged with #2 FiberWire suture was passed through the greater trochanter after performing 2 holes and eventually was tied to each other to reattach both the capsule and the short external rotators.    The wound was closed in layers.  Sterile dressings were applied.  The patient was transferred in stable condition to PACU.    PLAN:  The patient will remain weightbearing as tolerated.  The patient will proceed with physical therapy without restrictions.  The patient will maintain the ulnar restrictions for a total hip arthroplasty x 6 weeks.    She will return to clinic in 2 weeks for suture removal, which will be within the foot and ankle nurse clinic schedule.    The patient then will be reevaluated at 6 weeks from surgery, and at that time AP pelvis and a lateral x-ray of the left hip will be obtained.    Jeffrey Paulino MD        D:  2023   T: 2023   MT: FRANCESCO    Name:     ERIKA SERRANO  MRN:      [id number]        Account:        [id number]   :      1934           Procedure Date: 2023     Document: [id number]

## 2023-03-22 NOTE — PROGRESS NOTES
03/22/23 1629   Appointment Info   Signing Clinician's Name / Credentials (OT) Tonya Arias OTR/L   Rehab Comments (OT) Posterior hip precautions, WBAT on LLE       Present yes   Language Romansh  (iPad and son translating)   Living Environment   People in Home child(ksenia), adult   Current Living Arrangements apartment   Home Accessibility no concerns   Transportation Anticipated family or friend will provide   Living Environment Comments Pt lives with adult son in apartment. Pt uses commode for toileting.   Self-Care   Usual Activity Tolerance fair   Current Activity Tolerance poor   Equipment Currently Used at Home shower chair;wheelchair, manual;commode chair   Fall history within last six months yes   Number of times patient has fallen within last six months 1  (from commode leading to admission)   Activity/Exercise/Self-Care Comment Pt dependent with most ADLs, except set up with some grooming tasks. W/c based at baseline from history of CVA and son assists with supine to sit transfers and squat pivot transfers. Uses commode for toileting.   General Information   Onset of Illness/Injury or Date of Surgery 03/21/23   Referring Physician Harpreet Cox MD   Additional Occupational Profile Info/Pertinent History of Current Problem Julio Grier is a 88 year old female now s/p L hip hemiarthroplasty on 3/21 with Dr. Paulino.   Existing Precautions/Restrictions fall;no hip ADD past midline;no hip ER;90 degree hip flexion   Left Upper Extremity (Weight-bearing Status) full weight-bearing (FWB)   Right Upper Extremity (Weight-bearing Status) full weight-bearing (FWB)   Left Lower Extremity (Weight-bearing Status) weight-bearing as tolerated (WBAT)   Right Lower Extremity (Weight-bearing Status) full weight-bearing (FWB)   Cognitive Status Examination   Cognitive Status Comments Pt confused t/o session. Difficulty answering questions at times.   Pain Assessment   Patient Currently in Pain Yes, see  Vital Sign flowsheet   Range of Motion Comprehensive   Comment, General Range of Motion RUE ROM impaired, LUE impaired d/t history of CVA   Strength Comprehensive (MMT)   Comment, General Manual Muscle Testing (MMT) Assessment 3/5  in LUE   Bed Mobility   Comment (Bed Mobility) Max Ax2 rolling L and R   Transfers   Transfer Comments OH lift   Activities of Daily Living   BADL Assessment/Intervention toileting;lower body dressing;upper body dressing;bathing;grooming   Bathing Assessment/Intervention   Comment, (Bathing) Per clinical judgement, dependent in supine   Upper Body Dressing Assessment/Training   Comment, (Upper Body Dressing) Per clinical judgement, dependent in supine   Lower Body Dressing Assessment/Training   Comment, (Lower Body Dressing) Per clinical judgement, dependent in supine   Grooming Assessment/Training   Comment, (Grooming) Set up for face hygiene seated in recliner   Toileting   Comment, (Toileting) Per clinical judgement, dependent for toilet transfers   Clinical Impression   Criteria for Skilled Therapeutic Interventions Met (OT) Yes, treatment indicated   OT Diagnosis Decline in ADL and functional transfer performance   Influenced by the following impairments Pain, post-surgical precautions   OT Problem List-Impairments impacting ADL problems related to;activity tolerance impaired;cognition;mobility;pain;post-surgical precautions   Assessment of Occupational Performance 1-3 Performance Deficits   Identified Performance Deficits Toileting, bathing, LB dressing   Planned Therapy Interventions (OT) ADL retraining;bed mobility training;transfer training;home program guidelines;risk factor education   Clinical Decision Making Complexity (OT) moderate complexity   Risk & Benefits of therapy have been explained evaluation/treatment results reviewed;care plan/treatment goals reviewed;risks/benefits reviewed;current/potential barriers reviewed;participants voiced agreement with care  plan;participants included;patient;son;caregiver   OT Total Evaluation Time   OT Eval, Moderate Complexity Minutes (50986) 10   OT Goals   Therapy Frequency (OT) 4 times/wk   OT Predicted Duration/Target Date for Goal Attainment 03/29/23   OT Goals Lower Body Dressing;Lower Body Bathing;Toilet Transfer/Toileting;Bed Mobility;OT Goal 1   OT: Lower Body Dressing Maximum assist;within precautions   OT: Lower Body Bathing Maximum assist;with precautions   OT: Bed Mobility Maximum assist;supine to/from sitting;rolling;within precautions   OT: Toilet Transfer/Toileting Maximum assist;within precautions   OT: Goal 1 Family will IND recall posterior hip precautions and implications for ADLs.   Self-Care/Home Management   Self-Care/Home Mgmt/ADL, Compensatory, Meal Prep Minutes (72358) 12   Symptoms Noted During/After Treatment (Meal Preparation/Planning Training) none   Treatment Detail/Skilled Intervention OT: Upon completion of eval, treatment indicated. Extensive education on posterior hip precautions and implications during ADLs, with family verbalizing understanding and pt's son audio recording therapist and  during education to recall if needed.   Therapeutic Activities   Therapeutic Activity Minutes (73073) 30   Symptoms noted during/after treatment fatigue;increased pain   Treatment Detail/Skilled Intervention OT: Attempted to facilitate bed mobility to progress functional endurance. Facilitated rolling to R with Max Ax2. Attempted to perform side lying to sit transfer with pt unable to tolerate d/t increased pain. Pt required rest break d/t increased pain. Pt agreeable to using OH lift to recliner to progress OOB. Time spent setting up environment and line management for safe transfer. Performed rolling L and R with Max Ax2 to place sling. OH lift from bed to recliner. Pt remained in recliner at end of session with all needs within reach BLE elevated within precautions on pillows, and LUE elevated on  pillow.   OT Discharge Planning   OT Plan Progress supine to sit transfers, trial squat pivot if able vs OH lift, continue education on posterior hip precautions to family as needed   OT Discharge Recommendation (DC Rec) home with assist   OT Rationale for DC Rec Pt performing below but near functional baseline, primarily limited by pain. Pt requires assist with all ADLs/IADLs at baseline and is w/c based performing squat pivots with son's assist to/from w/c. Anticipate with 2-3 IP OT sessions and continued pain management, pt will progress to d/c home with family to assist with all ADLs/IADLs and functional transfers.   OT Brief overview of current status Max Ax2 bed mobility, OH lift to recliner   Total Session Time   Timed Code Treatment Minutes 42   Total Session Time (sum of timed and untimed services) 52

## 2023-03-22 NOTE — PROGRESS NOTES
"Pain Service Progress Note  [facility]  Date: 03/22/2023       Patient Name: Julio Grier  MRN: [id number]  Age: 88 year old  Sex: female      Assessment:  88 y.o. with L hip fracture presenting for possible OR management. Planning to place L fascia iliaca cathter for postop management.     Procedure: L fascia iliac cathter     Date of Surgery: 3/21/23     Date of Catheter Placement: 3/21/23    Plan/Recommendations:  1. Regional Anesthesia/Analgesia  -Continuous Catheter Type/Site: left fascia iliaca  Infusate: Ropivicaine 2%  Programmed Intermittent Bolus (PIB) at 10 mL Q60 min via each catheter, total infusion rate of 10 mL/hr     Plan to maintain catheter prn, max of 7 days  Discussed with family about timing for removal, likely in the hospital but possible to bring home if still being beneficial and needed.     2. Anticoagulation  -Please contact Inpatient Pain Service before ordering or making any anticoagulation changes        3. Multimodal Analgesia  - per primary team     Pain Service will continue to follow.    Discussed with attending anesthesiologist    Joshua lAexis DO  03/22/2023     Overnight Events: none    Tubes/Drains: No      Subjective:  Doing well per family   Nausea: No  Vomiting: No  Pruritus: No  Symptoms of LAST: No    Pain Location:  Left hip    Pain Intensity:    Satisfied with your level of pain control: Yes    Diet: Clear Liquid Diet    Relevant Labs:  Recent Labs   Lab Test 03/22/23  0416 03/22/23  0410 03/21/23  0848 03/20/23  1652   INR  --   --   --  1.06     --   --  210   BUN  --  12.8   < > 17.2    < > = values in this interval not displayed.       Physical Exam:  Vitals: /64   Pulse 105   Temp 97.9  F (36.6  C) (Oral)   Resp 10   Ht 1.575 m (5' 2.01\")   Wt 40.8 kg (89 lb 15.2 oz)   SpO2 100%   BMI 16.45 kg/m      Physical Exam:   Orientation:  Alert, oriented, and in no acute distress: No  Sedation: No    Motor Examination:  5/5 " Strength in lower extremities: No, prior stroke    Sensory Level:   Decrease in sensation: Yes    Catheter Site:   Catheter entry site is clean/dry/intact: Yes    Tender: No      Relevant Medications:  Current Pain Medications:  Medications related to Pain Management (From now, onward)    Start     Dose/Rate Route Frequency Ordered Stop    03/22/23 1035  oxyCODONE IR (ROXICODONE) half-tab 2.5-5 mg         2.5-5 mg Oral EVERY 4 HOURS PRN 03/22/23 1035      03/22/23 0900  senna-docusate (SENOKOT-S/PERICOLACE) 8.6-50 MG per tablet 1 tablet         1 tablet Oral 2 TIMES DAILY 03/22/23 0830      03/21/23 1500  acetaminophen (TYLENOL) tablet 975 mg         975 mg Oral EVERY 8 HOURS 03/21/23 1308      03/21/23 1415  ropivacaine 0.2% in NS perineural infusion (simple)          Perineural Continuous Nerve Block 03/21/23 1351      03/21/23 1030  polyethylene glycol (MIRALAX) Packet 17 g         17 g Oral DAILY 03/21/23 1000      03/21/23 0223  methocarbamol (ROBAXIN) tablet 500 mg         500 mg Oral EVERY 4 HOURS PRN 03/21/23 0223      03/21/23 0202  senna-docusate (SENOKOT-S/PERICOLACE) 8.6-50 MG per tablet 1 tablet        See Hyperspace for full Linked Orders Report.    1 tablet Oral 2 TIMES DAILY PRN 03/21/23 0204      03/21/23 0202  senna-docusate (SENOKOT-S/PERICOLACE) 8.6-50 MG per tablet 2 tablet        See Hyperspace for full Linked Orders Report.    2 tablet Oral 2 TIMES DAILY PRN 03/21/23 0204      03/21/23 0202  acetaminophen (TYLENOL) tablet 650 mg        See Hyperspace for full Linked Orders Report.    650 mg Oral EVERY 4 HOURS PRN 03/21/23 0204      03/21/23 0202  acetaminophen (TYLENOL) solution 650 mg        See Hyperspace for full Linked Orders Report.    650 mg Per NG tube EVERY 4 HOURS PRN 03/21/23 0204      03/21/23 0202  HYDROmorphone (DILAUDID) injection 0.2 mg         0.2 mg Intravenous EVERY 2 HOURS PRN 03/21/23 0204            Primary Service Contacted with Recommendations? Yes      20 MINUTES SPENT  "BY ME on the date of service doing chart review, history, exam, documentation & further activities per the note.      Acute Inpatient Pain Service [facility]  Hours of pain coverage 24/7   Page via Amcom- Please Page the Pain Team Via [facility]: \"PAIN MANAGEMENT ACUTE INPATIENT/ [facility]\"           "

## 2023-03-22 NOTE — PROGRESS NOTES
"PACU to Inpatient Nursing Handoff    Patient Julio Grier is a 88 year old female who speaks Urdu.   Procedure Procedure(s):  Hemiarthroplasty Left Hip   Surgeon(s) Primary: Jeffrey Paulino MD  Resident - Assisting: Rosa Ernandez MD     Allergies   Allergen Reactions     Aspirin Unknown     Irbesartan Unknown     Penicillins Other (See Comments)     Pt has some sort of reaction at dentist office after PEN supposedly. Pt cannot verify nor can family. Pt has no reaction to \"stephane\" products as was previously indicated. This was verified by familly. All this reviewed with opthalmalogist and anestheiologist on 5/17/2018       Isolation  No active isolations     Past Medical History   has a past medical history of Diabetes mellitus (H) and Unspecified cerebral artery occlusion with cerebral infarction.    Anesthesia General   Dermatome Level     Preop Meds Not applicable   Nerve block Left iliac fascia block .  Location:left. Med:ropivacaine. Time given: will start in PACU if it comes before transferring up to floor   Intraop Meds dexamethasone (Decadron)  dexmedetomidine (Precedex): 10 mcg total  fentanyl (Sublimaze): 75 mcg total  ondansetron (Zofran): last given at 2105   Local Meds Yes   Antibiotics cefazolin (Ancef) - last given at 1920     Pain Patient Currently in Pain: yes   PACU meds  Not applicable   PCA / epidural No   Capnography  Yes   Telemetry ECG Rhythm: Normal sinus rhythm   Inpatient Telemetry Monitor Ordered? Yes        Labs Glucose Lab Results   Component Value Date     03/21/2023     06/03/2022     01/01/2013       Hgb Lab Results   Component Value Date    HGB 11.4 03/20/2023    HGB 13.1 01/01/2013       INR Lab Results   Component Value Date    INR 1.06 03/20/2023    INR 0.95 08/26/2012      PACU Imaging Completed     Wound/Incision Wound Sacrum Pressure injury community acquired Stage 1 (Active)   Wound Bed Pink 03/21/23 0200   Barbie-wound Assessment Warm 03/21/23 0200 "   Drainage Amount None 03/21/23 1200   Wound Care/Cleansing Other (Comment) 03/21/23 0200   Dressing Foam 03/21/23 1200   Dressing Status Changed 03/21/23 0200   Number of days: 0       Incision/Surgical Site 03/21/23 Left Hip (Active)   Incision Assessment UTV 03/21/23 2122   Barbie-Incision Assessment UTV 03/21/23 2122   Closure GEOFF 03/21/23 2122   Incision Drainage Amount Scant 03/21/23 2122   Drainage Description Serosanguinous 03/21/23 2122   Dressing Intervention Moist drainage 03/21/23 2122   Number of days: 0      CMS        Equipment ice pack   Other LDA       IV Access Peripheral IV 03/20/23 Anterior;Left Upper forearm (Active)   Site Assessment WDL 03/21/23 2122   Line Status Infusing 03/21/23 2122   Phlebitis Scale 0-->no symptoms 03/21/23 2122   Infiltration Scale 0 03/21/23 2122   Number of days: 1       Peripheral IV 03/21/23 Left Lower forearm (Active)   Site Assessment WDL 03/21/23 2122   Line Status Saline locked 03/21/23 2122   Phlebitis Scale 0-->no symptoms 03/21/23 2122   Infiltration Scale 0 03/21/23 2122   Number of days: 0       Arterial Line 03/21/23 Radial (Active)   Site Assessment WDL Except 03/21/23 2122   Line Status Pulsatile blood flow 03/21/23 2122   Art Line Waveform Appropriate 03/21/23 2122   Art Line Interventions Zeroed and calibrated;Leveled 03/21/23 2122   Color/Movement/Sensation Capillary refill less than 3 sec 03/21/23 2122   Line Necessity Yes, meets criteria 03/21/23 2122   Dressing Status Clean, dry, intact 03/21/23 2122   Number of days: 0      Blood Products Not applicable  mL   Intake/Output Date 03/21/23 0700 - 03/22/23 0659   Shift 2709-0798 4402-3537 0251-1658 24 Hour Total   INTAKE   P.O. 118   118   I.V. 20 700  720   Shift Total(mL/kg) 138(3.38) 700(17.16)  838(20.54)   OUTPUT   Urine 525   525   Blood  100  100   Shift Total(mL/kg) 525(12.87) 100(2.45)  625(15.32)   Weight (kg) 40.8 40.8 40.8 40.8      Drains / Modi Urethral Catheter 03/20/23 16 fr  (Active)   Tube Description Positional 03/21/23 1200   Catheter Care Done;Catheter wipes 03/21/23 1200   Collection Container Standard 03/21/23 2122   Securement Method Securing device (Describe) 03/21/23 2122   Rationale for Continued Use Strict 1-2 Hour I&O 03/21/23 1200   Urine Output 200 mL 03/21/23 1200   Number of days: 1      Time of void PreOp  WEBB    PostOp  WEBB    Diapered? No   Bladder Scan      mL (OJ) (03/21/23 1249)  tolerating sips     Vitals    B/P: (!) 177/82  T: 97  F (36.1  C)    Temp src: Axillary  P:  Pulse: 76 (03/21/23 2145)          R: 16  O2:  SpO2: 97 %    O2 Device: Nasal cannula (03/21/23 2145)    Oxygen Delivery: 2 LPM (03/21/23 2145)         Family/support present children   Patient belongings     Patient transported on bed   DC meds/scripts (obs/outpt) Not applicable   Inpatient Pain Meds Released? Yes       Special needs/considerations None   Tasks needing completion None       Leann Arnett, RN  ASCOM [id number]

## 2023-03-22 NOTE — ANESTHESIA CARE TRANSFER NOTE
Patient: Young J Oh    Procedure: Procedure(s):  Hemiarthroplasty Left Hip       Diagnosis: Hip fracture (H) [S72.009A]  Diagnosis Additional Information: No value filed.    Anesthesia Type:   General     Note:    Oropharynx: oropharynx clear of all foreign objects and spontaneously breathing  Level of Consciousness: drowsy  Oxygen Supplementation: face mask  Level of Supplemental Oxygen (L/min / FiO2): 7  Independent Airway: airway patency satisfactory and stable  Dentition: dentition unchanged  Vital Signs Stable: post-procedure vital signs reviewed and stable  Report to RN Given: handoff report given  Patient transferred to: PACU    Handoff Report: Identifed the Patient, Identified the Reponsible Provider, Reviewed the pertinent medical history, Discussed the surgical course, Reviewed Intra-OP anesthesia mangement and issues during anesthesia, Set expectations for post-procedure period and Allowed opportunity for questions and acknowledgement of understanding      Vitals:  Vitals Value Taken Time   /82 03/21/23 2122   Temp 36.1  C (97  F) 03/21/23 2122   Pulse 77 03/21/23 2133   Resp 6 03/21/23 2133   SpO2 98 % 03/21/23 2133   Vitals shown include unvalidated device data.    Electronically Signed By: SHELL Arauz CRNA  March 21, 2023  9:34 PM

## 2023-03-23 ENCOUNTER — APPOINTMENT (OUTPATIENT)
Dept: SPEECH THERAPY | Facility: CLINIC | Age: 88
DRG: 521 | End: 2023-03-23
Payer: COMMERCIAL

## 2023-03-23 ENCOUNTER — APPOINTMENT (OUTPATIENT)
Dept: OCCUPATIONAL THERAPY | Facility: CLINIC | Age: 88
DRG: 521 | End: 2023-03-23
Payer: COMMERCIAL

## 2023-03-23 LAB
ANION GAP SERPL CALCULATED.3IONS-SCNC: 11 MMOL/L (ref 7–15)
BASOPHILS # BLD AUTO: 0 10E3/UL (ref 0–0.2)
BASOPHILS NFR BLD AUTO: 0 %
BUN SERPL-MCNC: 15.1 MG/DL (ref 8–23)
CALCIUM SERPL-MCNC: 8.7 MG/DL (ref 8.8–10.2)
CHLORIDE SERPL-SCNC: 104 MMOL/L (ref 98–107)
CREAT SERPL-MCNC: 0.83 MG/DL (ref 0.51–0.95)
DEPRECATED HCO3 PLAS-SCNC: 25 MMOL/L (ref 22–29)
EOSINOPHIL # BLD AUTO: 0.3 10E3/UL (ref 0–0.7)
EOSINOPHIL NFR BLD AUTO: 3 %
ERYTHROCYTE [DISTWIDTH] IN BLOOD BY AUTOMATED COUNT: 14.5 % (ref 10–15)
GFR SERPL CREATININE-BSD FRML MDRD: 67 ML/MIN/1.73M2
GLUCOSE BLDC GLUCOMTR-MCNC: 166 MG/DL (ref 70–99)
GLUCOSE BLDC GLUCOMTR-MCNC: 169 MG/DL (ref 70–99)
GLUCOSE BLDC GLUCOMTR-MCNC: 176 MG/DL (ref 70–99)
GLUCOSE BLDC GLUCOMTR-MCNC: 202 MG/DL (ref 70–99)
GLUCOSE BLDC GLUCOMTR-MCNC: 202 MG/DL (ref 70–99)
GLUCOSE BLDC GLUCOMTR-MCNC: 217 MG/DL (ref 70–99)
GLUCOSE BLDC GLUCOMTR-MCNC: 227 MG/DL (ref 70–99)
GLUCOSE SERPL-MCNC: 185 MG/DL (ref 70–99)
HCT VFR BLD AUTO: 28.8 % (ref 35–47)
HGB BLD-MCNC: 9.5 G/DL (ref 11.7–15.7)
IMM GRANULOCYTES # BLD: 0.1 10E3/UL
IMM GRANULOCYTES NFR BLD: 1 %
LYMPHOCYTES # BLD AUTO: 1.1 10E3/UL (ref 0.8–5.3)
LYMPHOCYTES NFR BLD AUTO: 11 %
MAGNESIUM SERPL-MCNC: 2.3 MG/DL (ref 1.7–2.3)
MCH RBC QN AUTO: 32.9 PG (ref 26.5–33)
MCHC RBC AUTO-ENTMCNC: 33 G/DL (ref 31.5–36.5)
MCV RBC AUTO: 100 FL (ref 78–100)
MONOCYTES # BLD AUTO: 0.5 10E3/UL (ref 0–1.3)
MONOCYTES NFR BLD AUTO: 5 %
NEUTROPHILS # BLD AUTO: 8.1 10E3/UL (ref 1.6–8.3)
NEUTROPHILS NFR BLD AUTO: 80 %
NRBC # BLD AUTO: 0 10E3/UL
NRBC BLD AUTO-RTO: 0 /100
PHOSPHATE SERPL-MCNC: 2.7 MG/DL (ref 2.5–4.5)
PLATELET # BLD AUTO: 165 10E3/UL (ref 150–450)
POTASSIUM SERPL-SCNC: 3.8 MMOL/L (ref 3.4–5.3)
RBC # BLD AUTO: 2.89 10E6/UL (ref 3.8–5.2)
SODIUM SERPL-SCNC: 140 MMOL/L (ref 136–145)
WBC # BLD AUTO: 10.1 10E3/UL (ref 4–11)

## 2023-03-23 PROCEDURE — 250N000013 HC RX MED GY IP 250 OP 250 PS 637: Performed by: PHYSICIAN ASSISTANT

## 2023-03-23 PROCEDURE — 250N000012 HC RX MED GY IP 250 OP 636 PS 637: Performed by: INTERNAL MEDICINE

## 2023-03-23 PROCEDURE — 92526 ORAL FUNCTION THERAPY: CPT | Mod: GN | Performed by: SPEECH-LANGUAGE PATHOLOGIST

## 2023-03-23 PROCEDURE — 250N000011 HC RX IP 250 OP 636: Performed by: NURSE PRACTITIONER

## 2023-03-23 PROCEDURE — 250N000013 HC RX MED GY IP 250 OP 250 PS 637: Performed by: NURSE PRACTITIONER

## 2023-03-23 PROCEDURE — 250N000013 HC RX MED GY IP 250 OP 250 PS 637: Performed by: SURGERY

## 2023-03-23 PROCEDURE — 84100 ASSAY OF PHOSPHORUS: CPT | Performed by: ANESTHESIOLOGY

## 2023-03-23 PROCEDURE — 83735 ASSAY OF MAGNESIUM: CPT

## 2023-03-23 PROCEDURE — 250N000011 HC RX IP 250 OP 636: Performed by: PHYSICIAN ASSISTANT

## 2023-03-23 PROCEDURE — 120N000002 HC R&B MED SURG/OB UMMC

## 2023-03-23 PROCEDURE — 80048 BASIC METABOLIC PNL TOTAL CA: CPT

## 2023-03-23 PROCEDURE — 99232 SBSQ HOSP IP/OBS MODERATE 35: CPT | Performed by: INTERNAL MEDICINE

## 2023-03-23 PROCEDURE — 250N000013 HC RX MED GY IP 250 OP 250 PS 637: Performed by: STUDENT IN AN ORGANIZED HEALTH CARE EDUCATION/TRAINING PROGRAM

## 2023-03-23 PROCEDURE — 97535 SELF CARE MNGMENT TRAINING: CPT | Mod: GO | Performed by: OCCUPATIONAL THERAPIST

## 2023-03-23 PROCEDURE — 85004 AUTOMATED DIFF WBC COUNT: CPT | Performed by: INTERNAL MEDICINE

## 2023-03-23 PROCEDURE — 36415 COLL VENOUS BLD VENIPUNCTURE: CPT | Performed by: INTERNAL MEDICINE

## 2023-03-23 PROCEDURE — 258N000003 HC RX IP 258 OP 636

## 2023-03-23 PROCEDURE — 250N000012 HC RX MED GY IP 250 OP 636 PS 637: Performed by: PHYSICIAN ASSISTANT

## 2023-03-23 PROCEDURE — 250N000013 HC RX MED GY IP 250 OP 250 PS 637: Performed by: INTERNAL MEDICINE

## 2023-03-23 RX ORDER — DEXTROSE MONOHYDRATE 25 G/50ML
25-50 INJECTION, SOLUTION INTRAVENOUS
Status: DISCONTINUED | OUTPATIENT
Start: 2023-03-23 | End: 2023-03-31 | Stop reason: HOSPADM

## 2023-03-23 RX ORDER — AMLODIPINE BESYLATE 2.5 MG/1
2.5 TABLET ORAL DAILY
Status: DISCONTINUED | OUTPATIENT
Start: 2023-03-23 | End: 2023-03-31 | Stop reason: HOSPADM

## 2023-03-23 RX ORDER — NICOTINE POLACRILEX 4 MG
15-30 LOZENGE BUCCAL
Status: DISCONTINUED | OUTPATIENT
Start: 2023-03-23 | End: 2023-03-31 | Stop reason: HOSPADM

## 2023-03-23 RX ADMIN — TRIAMCINOLONE ACETONIDE: 1 CREAM TOPICAL at 21:00

## 2023-03-23 RX ADMIN — INSULIN GLARGINE 20 UNITS: 100 INJECTION, SOLUTION SUBCUTANEOUS at 13:51

## 2023-03-23 RX ADMIN — SODIUM CHLORIDE, POTASSIUM CHLORIDE, SODIUM LACTATE AND CALCIUM CHLORIDE: 600; 310; 30; 20 INJECTION, SOLUTION INTRAVENOUS at 06:58

## 2023-03-23 RX ADMIN — INSULIN ASPART 4 UNITS: 100 INJECTION, SOLUTION INTRAVENOUS; SUBCUTANEOUS at 00:59

## 2023-03-23 RX ADMIN — FEXOFENADINE HYDROCHLORIDE 90 MG: 30 SUSPENSION ORAL at 09:03

## 2023-03-23 RX ADMIN — Medication 5 MG: at 22:46

## 2023-03-23 RX ADMIN — POLYETHYLENE GLYCOL 3350 17 G: 17 POWDER, FOR SOLUTION ORAL at 08:56

## 2023-03-23 RX ADMIN — SENNOSIDES AND DOCUSATE SODIUM 1 TABLET: 50; 8.6 TABLET ORAL at 08:56

## 2023-03-23 RX ADMIN — MIRTAZAPINE 7.5 MG: 7.5 TABLET, FILM COATED ORAL at 21:48

## 2023-03-23 RX ADMIN — TRIAMCINOLONE ACETONIDE: 1 CREAM TOPICAL at 11:48

## 2023-03-23 RX ADMIN — HYDROCORTISONE: 1 CREAM TOPICAL at 11:44

## 2023-03-23 RX ADMIN — ACETAMINOPHEN 975 MG: 325 TABLET, FILM COATED ORAL at 22:52

## 2023-03-23 RX ADMIN — METFORMIN HYDROCHLORIDE 500 MG: 500 TABLET ORAL at 18:31

## 2023-03-23 RX ADMIN — SENNOSIDES AND DOCUSATE SODIUM 1 TABLET: 50; 8.6 TABLET ORAL at 21:00

## 2023-03-23 RX ADMIN — AMLODIPINE BESYLATE 2.5 MG: 2.5 TABLET ORAL at 12:57

## 2023-03-23 RX ADMIN — FEXOFENADINE HYDROCHLORIDE 90 MG: 30 SUSPENSION ORAL at 20:59

## 2023-03-23 RX ADMIN — INSULIN ASPART 4 UNITS: 100 INJECTION, SOLUTION INTRAVENOUS; SUBCUTANEOUS at 09:14

## 2023-03-23 RX ADMIN — ENOXAPARIN SODIUM 30 MG: 30 INJECTION SUBCUTANEOUS at 16:05

## 2023-03-23 RX ADMIN — INSULIN ASPART 3 UNITS: 100 INJECTION, SOLUTION INTRAVENOUS; SUBCUTANEOUS at 05:58

## 2023-03-23 RX ADMIN — ACETAMINOPHEN 975 MG: 325 TABLET, FILM COATED ORAL at 16:05

## 2023-03-23 RX ADMIN — ACETAMINOPHEN 975 MG: 325 TABLET, FILM COATED ORAL at 06:19

## 2023-03-23 RX ADMIN — INSULIN GLARGINE 20 UNITS: 100 INJECTION, SOLUTION SUBCUTANEOUS at 21:00

## 2023-03-23 RX ADMIN — METFORMIN HYDROCHLORIDE 500 MG: 500 TABLET ORAL at 12:57

## 2023-03-23 RX ADMIN — ATORVASTATIN CALCIUM 20 MG: 20 TABLET, FILM COATED ORAL at 08:56

## 2023-03-23 RX ADMIN — CEFTRIAXONE SODIUM 1 G: 1 INJECTION, POWDER, FOR SOLUTION INTRAMUSCULAR; INTRAVENOUS at 16:05

## 2023-03-23 ASSESSMENT — ACTIVITIES OF DAILY LIVING (ADL)
ADLS_ACUITY_SCORE: 66
ADLS_ACUITY_SCORE: 70
ADLS_ACUITY_SCORE: 66
ADLS_ACUITY_SCORE: 66
ADLS_ACUITY_SCORE: 70
ADLS_ACUITY_SCORE: 66
ADLS_ACUITY_SCORE: 66
ADLS_ACUITY_SCORE: 70
ADLS_ACUITY_SCORE: 66
ADLS_ACUITY_SCORE: 70

## 2023-03-23 NOTE — PROGRESS NOTES
"Pain Service Progress Note  [facility]  Date: 03/23/2023       Patient Name: Julio Grier  MRN: [id number]  Age: 88 year old  Sex: female        Left fascia iliaca catheter found out from skin overnight.      Pain Service will sign off    SHELL Murphy CNP     Acute Inpatient Pain Service [facility]  Hours of pain coverage 24/7   Page via Amcom- Please Page the Pain Team Via Amcom: \"PAIN MANAGEMENT ACUTE INPATIENT/ [facility]\"             "

## 2023-03-23 NOTE — PLAN OF CARE
Speech Language Therapy Discharge Summary    Reason for therapy discharge:    All goals and outcomes met, no further needs identified.    Progress towards therapy goal(s). See goals on Care Plan in [facility] electronic health record for goal details.  Goals met    Therapy recommendation(s):    No further therapy is recommended.    SLP note from today:  RN reported no swallowing concerns with PO intake today. RN reported patient's son fed patient lunch. Patient took pills crushed with applesauce. Dietician present for portion of session. No family present today. Patient consumed 2 oz slightly thick water by small cup and 3 bites soft/bite sized texture. No overt aspiration signs occurred across intake. Patient declined further intake. Provided further education about diet modifications and swallow strategies. Patient has met swallow related goals and tolerating baseline diet.    Recommend continue soft/bite sized textures and slightly thick liquids (IDDSI 6, 1) given 1:1 supervision/assist and swallow strategies (fully alert and upright position at 90 degrees, no straws, small bites/sips, slow pace, use small size cup). Please crush pills with applesauce. Patient's family instructed to check foods brought from home with RN to ensure proper consistency.

## 2023-03-23 NOTE — PLAN OF CARE
Assigned RN saw the patient's cont. Nerve block (Ropivacaine 0.2%) was pulled out. Guessing pt might have pulled out the line.   Ortho was paged. Anesthesia was also paged but was advised to paged pain team. Pain service also paged. Awaiting responses.

## 2023-03-23 NOTE — PROGRESS NOTES
"CLINICAL NUTRITION SERVICES - ASSESSMENT NOTE     Nutrition Prescription    RECOMMENDATIONS FOR MDs/PROVIDERS TO ORDER:  None    Malnutrition Status:    Severe malnutrition in the context of acute on chronic illness    Recommendations already ordered by Registered Dietitian (RD):  Ensure Enlive at meals    Future/Additional Recommendations:  Monitor labs, intakes, and weight trends.     REASON FOR ASSESSMENT  Julio Grier is a/an 88 year old female assessed by the dietitian for Provider Order - malnutrition.    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 Patient transfer [facility] [place] [facility]throplasty. Surgical procedure was uncomplicated. Transferred to floor yesterday.     FINDINGS  RD met with pt at bedside. SLP was also in the room evaluating the pt's swallow during our conversation. Pt states she has a good appetite and is hungry. Does not have any questions or concerns. Attempted to use iPad  however the first attempt was cut off and pt seemed very soft-spoken and did not say much.     CURRENT NUTRITION ORDERS  Diet: Level 6: Soft & Bite-Sized Dysphagia Diet  and Slightly Thick  Thickened Liquids   Intake/Tolerance: 100% of one documented meal.    LABS  Labs reviewed    MEDICATIONS  Medications reviewed: ceftriaxone, insulin, magnesium sulfate, remeron, miralax, senna, LR    ANTHROPOMETRICS  Height: 157.5 cm (5' 2.008\")  Most Recent Weight: 40.8 kg (89 lb 15.2 oz)    IBW: 50 kg (82% IBW)  BMI: Underweight BMI <18.5  Weight History: Pt with limited weight history prior to admission.     Wt Readings from Last Encounters:   03/21/23 40.8 kg (89 lb 15.2 oz)   06/03/22 40.8 kg (90 lb)     Dosing Weight: 41 kg - most recent weight    ASSESSED NUTRITION NEEDS  Estimated Energy Needs: [id number] kcals/day (25 - 30 " + 35 kcals/kg)  Justification: Maintenance vs wound healing  Estimated Protein Needs: 41-50+ grams protein/day (1 - 1.2+ grams of pro/kg)  Justification: Increased needs  Estimated Fluid Needs: 1 ml/kcal or per provider pending fluid status    PHYSICAL FINDINGS  See malnutrition section below.  Sacral pressure wound stage 1    MALNUTRITION  % Intake: Unable to assess  % Weight Loss: Unable to assess  Subcutaneous Fat Loss: Facial region:  Moderate and Upper arm:  Moderate  Muscle Loss: Global moderate-severe  Fluid Accumulation/Edema: None noted  Malnutrition Diagnosis: Severe malnutrition in the context of acute on chronic illness    NUTRITION DIAGNOSIS  Inadequate oral intake related to poor appetite and chewing/swallowing difficulty as evidenced by patient on modified diet with thickened liquids, evident muscle/fat wasting.        INTERVENTIONS  Implementation  Medical food supplement therapy     Goals  Patient to consume % of nutritionally adequate meal trays TID, or the equivalent with supplements/snacks.     Monitoring/Evaluation  Progress toward goals will be monitored and evaluated per protocol.    Diana Pugh MS, RDN, LDN  RD pager: [phone]  WB Weekend/Holiday Pager: [phone]

## 2023-03-23 NOTE — ANESTHESIA POSTPROCEDURE EVALUATION
Patient: Young J Oh    Procedure: Procedure(s):  Left Hip Hemiarthroplasty       Anesthesia Type:  General    Note:  Disposition: ICU            ICU Sign Out: Anesthesiologist/ICU physician sign out WAS performed (Anesthesiologist:ICU physician sign out in advance. while patient in pacu Anesthesiologist:KITA sign out)   Postop Pain Control: Uneventful            Sign Out: Well controlled pain   PONV: No   Neuro/Psych: Uneventful            Sign Out: Acceptable/Baseline neuro status   Airway/Respiratory: Uneventful            Sign Out: Acceptable/Baseline resp. status   CV/Hemodynamics:    Other NRE:    DID A NON-ROUTINE EVENT OCCUR?            Last vitals:  Vitals Value Taken Time   /82 03/21/23 2122   Temp 36.8  C (98.2  F) 03/21/23 2200   Pulse 85 03/21/23 2231   Resp 11 03/21/23 2231   SpO2 99 % 03/21/23 2231   Vitals shown include unvalidated device data.    Electronically Signed By: Sara Nguyen MD  March 22, 2023  7:49 PM

## 2023-03-23 NOTE — PLAN OF CARE
Goal Outcome Evaluation:                                             VS:     Pt A/O X 4. Afebrile.    Output:     Bowels- active in all four quadrants.   No BM during shift. Tried using bedpan but did not go.   Has a sy and had good output during shift      Activity:     Pt not OOB during shift    Skin: Hemiarthroplasty of L shoulder.L.Hip      Pain:     Denies pain but given Oxy for intermittent pain, and scheduled tylenol     CMS:     Disoriented and confused       Dressing:     Surgical incisional dressing is CDI.       Diet:     Pt has dysphagia and is on a soft and bite sized diet and slightly thick liquids      LDA:     2 PIV is patent in the L forearm. 1 is infusing LR at 75 mL/hr and the other TKO IV fluids      Equipment:     IV pole and pt belongings in room.      Plan:     Ortho following.     Additional Info:      Pt pulled out L.Fascia iliac catheter    Pt is able to make needs known using Son at bedside and the call light is within the pt's reach. Pt knows how to use call light. Pt is Yakut and speak very minimal English.     iPad  not able to understand her well, same as son who reported she mixes languages.

## 2023-03-23 NOTE — PLAN OF CARE
"Goal Outcome Evaluation: ongoing    VS: /59   Pulse 94   Temp 98.2  F (36.8  C) (Oral)   Resp 16   Ht 1.575 m (5' 2.01\")   Wt 40.8 kg (89 lb 15.2 oz)   SpO2 92%   BMI 16.45 kg/m     O2: 92% on RA, lung sounds clear, denies SOB or respiratory distress    Output: Modi catheter in place, draining clear, yellow urine    Last BM: 3/19, bowel sounds present, passing flatus    Activity: Needs assist of two with turning and reposition    Up for meals? HOB at 90 degrees when eating    Skin: Left hip incision and scattered bruises to bilateral FA   Pain: Denies pain or discomfort    CMS: Alert to self and place, disoriented to time and situation, denies numbness or tingling. Left sided weakness    Dressing: Left hip drsg CDI   Diet: Soft and bite sized diet with slightly thick liquids    LDA: Left FA PIVs    Equipment: Personal belongings    Plan: Will continue plan of care    Additional Info:                             "

## 2023-03-23 NOTE — PROGRESS NOTES
SPIRITUAL HEALTH SERVICES  SPIRITUAL ASSESSMENT Progress Note  [facility] ([facility]) M 504 ortho 03/23/23    REFERRAL SOURCE:     Pt declined visit with Unit  per family's request. Family member stated Pt is not Oriental orthodox.    PLAN: No follow up necessary.    Mc Clark MA, MPA  Associate   Pager: [phone]

## 2023-03-23 NOTE — PLAN OF CARE
Shift: 03/22/2023 [id number]  Neuro: A/O x2, disoriented to situation/time, Hungarian speaking, family at bedside able to help interpret. CMS intact. L hemiparesis - baseline. PERRL. Afebrile. C/o mild hip pain, on scheduled tylenol and ropivacaine bolus q1h.   CV: tele SR. BP within parameters, SBP < 160, PRN hydralazine available.   PU: on RA. LS clear/dim.   GI: no BM this shift. Diet advanced to soft & bite sized diet w/slightly thicken liquid, tolerated well, good appetite, pills crushed in apple sauce/pudding. BG q4h, 241, 176, 186, covered with sliding scale.   : sy patent, poor output - improved with drinking fluids, needs encouragement to drink fluids.  Skin: L hip incision dressing, dried drainage, marked - unchanged. L fascia iliaca cath, WDL. Coccyx - old healed PI, dusky/non-blanched skin. R radial ART line removed, dressing CDI.  Lines: R PIV x2.   Other info: MG 1.8, replaced per protocol, recheck tomorrow. Son/daughter in law at bedside, updated with plan of care. Transferred to [facility], report given to RN, all belongings sent with patient.        Goal Outcome Evaluation: continue with plan of care, update provider with changes.      Plan of Care Reviewed With: patient, child, family

## 2023-03-23 NOTE — PROGRESS NOTES
"Orthopaedic Surgery Progress Note     Subjective: No acute events overnight. Pain well controlled. Tolerating PO. Voiding via sy. +Flatus, no BM.     Transferred to floor yesterday.     Objective: /67   Pulse 94   Temp 98.2  F (36.8  C) (Oral)   Resp 16   Ht 1.575 m (5' 2.01\")   Wt 40.8 kg (89 lb 15.2 oz)   SpO2 92%   BMI 16.45 kg/m      General: NAD, alert and oriented, cooperative with exam.   Cardio: RRR, extremities wwp.   Respiratory: Non-labored breathing.  MSK:   - LLE: Dressing c/d/i. Wiggles toes. Exam limited by no      Labs:  Hemoglobin   Date Value Ref Range Status   03/22/2023 10.1 (L) 11.7 - 15.7 g/dL Final   01/01/2013 13.1 11.7 - 15.7 g/dL Final   ]  All cultures:  No results for input(s): CULT in the last 168 hours.    Assessment and Plan: Julio Grier is a 88 year old female now s/p L hip hemiarthroplasty on 3/21 with Dr. Paulino.      Medicine Primary  Activity: Up with assist until independent. Posterior hip precautions   Weight bearing status: WBAT  Pain management: Transition from IV to PO as tolerated.    Diet: Begin with clear fluids and progress diet as tolerated.   DVT prophylaxis: lovenox and mechanical while in the hospital, discharge on lovenox x 4 weeks.  Imaging: AP/Lat L hip in PACU   Labs: Hgb POD#1.  Bracing/Splinting: Abduction pillow at rest and if confused   Dressings: Keep clean, dry and intact x 7 days.   Elevation: Elevate LLE on pillows to keep above the level of the heart as much as possible.   Physical Therapy/Occupational Therapy: Eval and treat.     Follow-up: Clinic with wound nurse in 2 weeks for suture removal no x-rays needed.   Disposition: Pending progress with therapies, pain control on orals, and medical stability, anticipate discharge to home on POD #2-3.     Rosa Ernandez MD  Orthopaedic Surgery Resident, PGY-4    "

## 2023-03-23 NOTE — PROGRESS NOTES
"[facility], [place]   Internal Medicine Daily Note           Interval History/Events     Overnight events reviewed  Interviewed and examined with phone   Patient reports doing well  Patient denies any pain  Reports no nausea vomiting chest pain shortness of           Review of Systems        4 point ROS including Respiratory, CV, GI and , other than that noted above is negative      Medications   I have reviewed current medications  in the \"current medication\" section of Epic.  Relevant changes include:     Physical Exam   General:       Vital signs:    Blood pressure 133/67, pulse 94, temperature 98.2  F (36.8  C), temperature source Oral, resp. rate 16, height 1.575 m (5' 2.01\"), weight 40.8 kg (89 lb 15.2 oz), SpO2 92 %.  Estimated body mass index is 16.45 kg/m  as calculated from the following:    Height as of this encounter: 1.575 m (5' 2.01\").    Weight as of this encounter: 40.8 kg (89 lb 15.2 oz).      Intake/Output Summary (Last 24 hours) at 3/23/2023 1148  Last data filed at 3/23/2023 0100  Gross per 24 hour   Intake 655 ml   Output 945 ml   Net -290 ml        Constitutional: Laying in bed in no acute distress  Eye: No icterus no pallor  Mouth/ENT: Normal oral mucosa  Cardiovascular: S1-S2 normal  Respiratory: Bilateral CTA  GI: Soft nontender  :   Neurology: Alert awake oriented  Psych:   MSK:   Integumentary:   Heme/Lymph/Imm:      Laboratory and Imaging Studies     I have reviewed  laboratory and imaging studies in the Epic. Pertinent findings are as below:    BMP  Recent Labs   Lab 03/23/23  0913 03/23/23  0736 03/23/23  0655 03/23/23  0405 03/22/23  0759 03/22/23  0410 03/21/23  2316 03/21/23  2204 03/21/23  1237 03/21/23  0848   NA  --   --  140  --   --  132*  --  135*  --  137   POTASSIUM  --   --  3.8  --   --  3.8  --  3.6  --  3.7   CHLORIDE  --   --  104  --   --  99  --  100  --  101   MARY  --   --  8.7*  --   --  8.1*  --  8.6*  --  8.8   CO2  --   " --  25  --   --  18*  --  23  --  24   BUN  --   --  15.1  --   --  12.8  --  10.2  --  10.9   CR  --   --  0.83  --   --  0.66  --  0.67  --  0.67   * 166* 185* 202*   < > 261*   < > 166*   < > 81    < > = values in this interval not displayed.     CBC  Recent Labs   Lab 03/23/23  0655 03/22/23  0416 03/21/23  2203 03/20/23  1652   WBC 10.1 12.8*  --  10.2   RBC 2.89* 3.04*  --  3.40*   HGB 9.5* 10.1*   < > 11.4*   HCT 28.8* 29.0*  --  35.2    95  --  104*   MCH 32.9 33.2*  --  33.5*   MCHC 33.0 34.8  --  32.4   RDW 14.5 14.0  --  14.8    181  --  210    < > = values in this interval not displayed.     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 Suspicion for urosepsis given hypotension and tachycardia and UA positive for nitrites, leukocyte esterase and large amounts of WBCs. Patient transfer to [facility] for Left Hip Arthroplasty. Surgical procedure was uncomplicated and patient is in stable condition.         # Hx of CVA c/b Hemiparesis and Hemiplegia  - Noted some h/o dysphagia in the chart.   - consult  SLP to see.   - Aspiration precautions for now. Clear liquid diet until SLP can evaluate.   - PTA Plavix 75mg daily on hold; resume  when okay with surgery   - Maintain circadian rhythm. Lights on during the day. Off at night, minimize cares at night.  OOB during the day.  - cont Mirtazapine at bedtime        # No acute issues   - Acapella for aggressive pulmonary toileting      # HTN   # HLD  Arterial line has been  removed  - resume  Amlodipine   - hold fish oil  - continue PTA Lipitor 20mg daily        # Hyponatremia  # Hypomagnesia   - monitor for now   - Discontinue LR  for IV fluid hydration.   - electrolyte replacement protocol  In place.      #  Hx of urinary retention  - hold oxybutynin for now      # Stress hyperglycemia superimposed on   # Diabetes Mellitus type II   - PTA medications: glipizide, metformin, lantus 40/32  Blood glucose now starting to run high  -Restart Lantus at 20 units twice daily  -Restart metformin  -Insulin aspart medium intensity correction before meals and bedtime  -Continue to monitor and titrate     # Concern for Urosepsis at time of admit with elevated lactate, initially 4.9  received IV Vancomycin and Cefepime x 1. Lactate initially 4.9-->1.1   >  3/20 UA: pyuria, +nitrates. UC with multiple microorganism: Gram-negative bacilli, positive bacilli, gram-negative bacilli, gram-positive bacilli, resembling diphtheroids.   Urine culture is likely contaminant  > 3/20 BC:  NGTD   > White count is normal on 3/23/2023  - Continue Rocephin for 5 days         # Acute blood loss anemia due to fracture and  surgical blood loss   > Threshold for transfusion if hgb <7.0 or signs/symptoms of hypoperfusion.     > EBL was reported at 100cc  > Hemoglobin level at 9.5 g/dL  -Transfuse if hemoglobin less than 7 or signs of hypoperfusion       # Left femoral neck fracture due to mechanical fall   # S/p LEFT hip hemiarthroplasty on 3/21   Regional anesthesia placed left iliac fascia block at [place].  Head CT negative for trauma x 2. trauma eval completed prior to transfer to [facility]   -  Fall precautions  - PT/OT.  Appreciate PT OT recommendations  - Posterior hip precautions. WBAT. Up with assist     # left hemiparesis due to stroke/CVA    Due to hx of CVA, does stand and transfer        DVT Prophylaxis: Defer to primary service, confer with orthopedics re lovenox/heparin   GI Prophylaxis: Not  indicated  Restraints: Restraints for medical healing needed: NO     Lines/ tubes/ drains:  - PIV's  - Modi     Disposition:  - Transfer to floor, no ICU needs        Pt's care was discussed with bedside RN, patient and  during Care Team Rounds.               Aristeo Beck MD  Hospitalist ( Internal medicine)  Pager: [phone]

## 2023-03-23 NOTE — PLAN OF CARE
"Goal Outcome Evaluation:      Plan of Care Reviewed With: patient    Overall Patient Progress: improvingOverall Patient Progress: improving       VS: VSS. Denies CP/SOB  Vital signs:  Temp: (!) 96.7  F (35.9  C) Temp src: Oral BP: 138/58 Pulse: 91   Resp: 16 SpO2: 96 % O2 Device: None (Room air) Oxygen Delivery: 2 LPM Height: 157.5 cm (5' 2.01\") Weight: 40.8 kg (89 lb 15.2 oz)  Estimated body mass index is 16.45 kg/m  as calculated from the following:    Height as of this encounter: 1.575 m (5' 2.01\").    Weight as of this encounter: 40.8 kg (89 lb 15.2 oz).   O2: >90% on RA    Output: Modi in place, draining clear   Last BM: 3/23/23   Activity: Assist of two with turning and reposition. Not OOB this shift    Skin: Left Hip incision and scattered bruises     Pain: Denies pain this shift, pt gets scheduled tylenol   CMS: Alert to self and place, disoriented to time and situation. Pt denies numbness and tinging.  Left sided weakness.   Dressing: Left hip dressing, ,Clean, Dry, Intact    Diet: Soft and bite sized diet with slightly thick liquids.      LDA: Left Forearm PIV   Equipment: Personal belongings   Plan: Will continue place of care.   Additional Info: Pt takes oral medications crushed and mixed with apple sauce.           "

## 2023-03-23 NOTE — PROGRESS NOTES
Pt arrived 1905. Settled in room. Heart and lungs sound normal and clear. Radial pulses WNL. Report given to oncoming RN.

## 2023-03-24 ENCOUNTER — APPOINTMENT (OUTPATIENT)
Dept: OCCUPATIONAL THERAPY | Facility: CLINIC | Age: 88
DRG: 521 | End: 2023-03-24
Payer: COMMERCIAL

## 2023-03-24 LAB
ANION GAP SERPL CALCULATED.3IONS-SCNC: 10 MMOL/L (ref 7–15)
BUN SERPL-MCNC: 16 MG/DL (ref 8–23)
CALCIUM SERPL-MCNC: 8.7 MG/DL (ref 8.8–10.2)
CHLORIDE SERPL-SCNC: 105 MMOL/L (ref 98–107)
CREAT SERPL-MCNC: 0.83 MG/DL (ref 0.51–0.95)
DEPRECATED HCO3 PLAS-SCNC: 23 MMOL/L (ref 22–29)
ERYTHROCYTE [DISTWIDTH] IN BLOOD BY AUTOMATED COUNT: 14.8 % (ref 10–15)
GFR SERPL CREATININE-BSD FRML MDRD: 67 ML/MIN/1.73M2
GLUCOSE BLDC GLUCOMTR-MCNC: 128 MG/DL (ref 70–99)
GLUCOSE BLDC GLUCOMTR-MCNC: 145 MG/DL (ref 70–99)
GLUCOSE BLDC GLUCOMTR-MCNC: 201 MG/DL (ref 70–99)
GLUCOSE BLDC GLUCOMTR-MCNC: 241 MG/DL (ref 70–99)
GLUCOSE SERPL-MCNC: 242 MG/DL (ref 70–99)
HCT VFR BLD AUTO: 26.3 % (ref 35–47)
HGB BLD-MCNC: 8.8 G/DL (ref 11.7–15.7)
MAGNESIUM SERPL-MCNC: 1.6 MG/DL (ref 1.7–2.3)
MCH RBC QN AUTO: 34 PG (ref 26.5–33)
MCHC RBC AUTO-ENTMCNC: 33.5 G/DL (ref 31.5–36.5)
MCV RBC AUTO: 102 FL (ref 78–100)
PHOSPHATE SERPL-MCNC: 2.3 MG/DL (ref 2.5–4.5)
PLATELET # BLD AUTO: 172 10E3/UL (ref 150–450)
POTASSIUM SERPL-SCNC: 4 MMOL/L (ref 3.4–5.3)
RBC # BLD AUTO: 2.59 10E6/UL (ref 3.8–5.2)
SODIUM SERPL-SCNC: 138 MMOL/L (ref 136–145)
WBC # BLD AUTO: 9.5 10E3/UL (ref 4–11)

## 2023-03-24 PROCEDURE — 250N000013 HC RX MED GY IP 250 OP 250 PS 637: Performed by: INTERNAL MEDICINE

## 2023-03-24 PROCEDURE — 250N000013 HC RX MED GY IP 250 OP 250 PS 637: Performed by: SURGERY

## 2023-03-24 PROCEDURE — 97535 SELF CARE MNGMENT TRAINING: CPT | Mod: GO | Performed by: OCCUPATIONAL THERAPIST

## 2023-03-24 PROCEDURE — 120N000002 HC R&B MED SURG/OB UMMC

## 2023-03-24 PROCEDURE — 250N000013 HC RX MED GY IP 250 OP 250 PS 637: Performed by: PHYSICIAN ASSISTANT

## 2023-03-24 PROCEDURE — 85027 COMPLETE CBC AUTOMATED: CPT

## 2023-03-24 PROCEDURE — 99222 1ST HOSP IP/OBS MODERATE 55: CPT | Mod: 24 | Performed by: REGISTERED NURSE

## 2023-03-24 PROCEDURE — 80048 BASIC METABOLIC PNL TOTAL CA: CPT

## 2023-03-24 PROCEDURE — 99223 1ST HOSP IP/OBS HIGH 75: CPT | Mod: 24 | Performed by: INTERNAL MEDICINE

## 2023-03-24 PROCEDURE — 250N000013 HC RX MED GY IP 250 OP 250 PS 637: Performed by: NURSE PRACTITIONER

## 2023-03-24 PROCEDURE — 84100 ASSAY OF PHOSPHORUS: CPT | Performed by: INTERNAL MEDICINE

## 2023-03-24 PROCEDURE — 99232 SBSQ HOSP IP/OBS MODERATE 35: CPT | Performed by: INTERNAL MEDICINE

## 2023-03-24 PROCEDURE — 36415 COLL VENOUS BLD VENIPUNCTURE: CPT | Performed by: PHYSICIAN ASSISTANT

## 2023-03-24 PROCEDURE — 87040 BLOOD CULTURE FOR BACTERIA: CPT | Performed by: PHYSICIAN ASSISTANT

## 2023-03-24 PROCEDURE — 250N000011 HC RX IP 250 OP 636: Performed by: NURSE PRACTITIONER

## 2023-03-24 PROCEDURE — 250N000011 HC RX IP 250 OP 636: Performed by: PHYSICIAN ASSISTANT

## 2023-03-24 PROCEDURE — 83735 ASSAY OF MAGNESIUM: CPT | Performed by: INTERNAL MEDICINE

## 2023-03-24 RX ORDER — SODIUM CHLORIDE 9 MG/ML
INJECTION, SOLUTION INTRAVENOUS
Status: DISCONTINUED
Start: 2023-03-24 | End: 2023-03-25 | Stop reason: HOSPADM

## 2023-03-24 RX ORDER — BISACODYL 10 MG
10 SUPPOSITORY, RECTAL RECTAL DAILY PRN
Status: DISCONTINUED | OUTPATIENT
Start: 2023-03-24 | End: 2023-03-31 | Stop reason: HOSPADM

## 2023-03-24 RX ADMIN — POTASSIUM & SODIUM PHOSPHATES POWDER PACK 280-160-250 MG 1 PACKET: 280-160-250 PACK at 19:40

## 2023-03-24 RX ADMIN — METFORMIN HYDROCHLORIDE 500 MG: 500 TABLET ORAL at 17:41

## 2023-03-24 RX ADMIN — TRIAMCINOLONE ACETONIDE: 1 CREAM TOPICAL at 19:46

## 2023-03-24 RX ADMIN — AMLODIPINE BESYLATE 2.5 MG: 2.5 TABLET ORAL at 08:11

## 2023-03-24 RX ADMIN — FEXOFENADINE HYDROCHLORIDE 90 MG: 30 SUSPENSION ORAL at 19:49

## 2023-03-24 RX ADMIN — MIRTAZAPINE 7.5 MG: 7.5 TABLET, FILM COATED ORAL at 21:39

## 2023-03-24 RX ADMIN — INSULIN GLARGINE 20 UNITS: 100 INJECTION, SOLUTION SUBCUTANEOUS at 08:31

## 2023-03-24 RX ADMIN — TRIAMCINOLONE ACETONIDE: 1 CREAM TOPICAL at 08:30

## 2023-03-24 RX ADMIN — ACETAMINOPHEN 975 MG: 325 TABLET, FILM COATED ORAL at 06:27

## 2023-03-24 RX ADMIN — SENNOSIDES AND DOCUSATE SODIUM 1 TABLET: 50; 8.6 TABLET ORAL at 08:11

## 2023-03-24 RX ADMIN — ENOXAPARIN SODIUM 30 MG: 30 INJECTION SUBCUTANEOUS at 15:43

## 2023-03-24 RX ADMIN — FEXOFENADINE HYDROCHLORIDE 90 MG: 30 SUSPENSION ORAL at 08:25

## 2023-03-24 RX ADMIN — CEFTRIAXONE SODIUM 1 G: 1 INJECTION, POWDER, FOR SOLUTION INTRAMUSCULAR; INTRAVENOUS at 14:55

## 2023-03-24 RX ADMIN — METFORMIN HYDROCHLORIDE 500 MG: 500 TABLET ORAL at 08:11

## 2023-03-24 RX ADMIN — ACETAMINOPHEN 975 MG: 325 TABLET, FILM COATED ORAL at 14:57

## 2023-03-24 RX ADMIN — ATORVASTATIN CALCIUM 20 MG: 20 TABLET, FILM COATED ORAL at 08:10

## 2023-03-24 RX ADMIN — Medication 2.5 MG: at 08:29

## 2023-03-24 RX ADMIN — SENNOSIDES AND DOCUSATE SODIUM 1 TABLET: 50; 8.6 TABLET ORAL at 19:40

## 2023-03-24 RX ADMIN — POLYETHYLENE GLYCOL 3350 17 G: 17 POWDER, FOR SOLUTION ORAL at 08:11

## 2023-03-24 ASSESSMENT — ACTIVITIES OF DAILY LIVING (ADL)
ADLS_ACUITY_SCORE: 66
ADLS_ACUITY_SCORE: 70
ADLS_ACUITY_SCORE: 66
ADLS_ACUITY_SCORE: 70
ADLS_ACUITY_SCORE: 66
ADLS_ACUITY_SCORE: 70
ADLS_ACUITY_SCORE: 70
ADLS_ACUITY_SCORE: 66
ADLS_ACUITY_SCORE: 70
ADLS_ACUITY_SCORE: 66

## 2023-03-24 NOTE — PROGRESS NOTES
"  VS: BP (!) 157/55 (BP Location: Right arm)   Pulse 79   Temp 96.8  F (36  C) (Oral)   Resp 15   Ht 1.575 m (5' 2.01\")   Wt 40.8 kg (89 lb 15.2 oz)   SpO2 99%   BMI 16.45 kg/m     O2: Room air saturations 99%.    Output: Pt had sy catheter in this am. Discontinued sy cath this afternoon per Dr Beck orders. Pt at baseline uses bedpan and commode at home.    Last BM: Pt has not had a BM for a few days per family. Pt was given oral medications to help to facilitate to have BM. Talked about have a suppository with family.    Activity: Pt at baseline has left sided weakness in left arm and left leg. Pt's son usually lifts her at home back and forth to commode and chair. Pt was up in chair for an hour today. Pt is a assist of 2. Son of patient very attentive and very helpful with cares for his mother.    Skin: At baseline has a rash all over body that topical cream is being used to areas per patients request. Son and daughter in law preferred to apply to the patient per her request.    Pain: Receiving schedule Tylenol for pain along with sparingly use of Oxycodone for pain before PT/OT   CMS: Pt has good pedal pulses bilaterally.    Dressing: Left hip dressing is CDI. Some bruising on left hip.    Diet: Soft bite sized diet. Pt has a good appetite and loves to have pudding and applesauce.   LDA: IV SL   Equipment: Sy, PCD's, hip abduction pillow,    Plan: Plan is for patient to be seen by ID and make recommendations with Blood culture growth.    Additional Info: Family is hoping to take the patient home tomorrow. Family is very helpful with interpreting and helping with plan of cares for patient. Pt is resting comfortably between cares.        "

## 2023-03-24 NOTE — PLAN OF CARE
Goal Outcome Evaluation:           VS: Refused Vitals OVN  Denies CP/SOB   O2: >90% on RA    Output: Modi in place, good urine output   Last BM: 3/23/23   Activity: Assist of two with turning and reposition. Not OOB this shift    Skin: Left Hip incision and scattered bruises   Pain: Stated in pain. Managed with scheduled tylenol   CMS: Alert to self and place, disoriented to time and situation.   Pt denies numbness and tinging.  Left sided weakness from previous CVA.   Dressing: Left hip dressing, ,Clean, Dry, Intact    Diet: Soft and bite sized diet with slightly thick liquids.    LDA: Left Forearm PIVs   Equipment: Personal belongings   Plan: Will continue plan of care.   Additional Info: Pt takes oral medications crushed and mixed with apple sauce.  Pt is Estonian speaking with very minimal English. Son at bedside to help translate during the day and Ipad in room as well  Pt passed SLP

## 2023-03-24 NOTE — CONSULTS
"  [facility] [place] GENERAL INFECTIOUS DISEASES CONSULTATION     Patient:  Julio Grier   Date of birth 9/13/1934, Medical record number [id number]  Date of Visit:  03/24/2023  Date of Admission: 3/20/2023  Consult Requester:Brenna Page*          Assessment and Recommendations:   ASSESSMENT:  1. Blood culture positive 1/2 bottles in 1/2 sets (Paenibacillus species)     Concerning for urosepsis vs contaminant    Repeat blood culture (3/24); awaiting collection    2. Urinalysis concerning for UTI with UC pending  a. UC polymicrobial  b. History of positive UC (6/3/22) Kelbsiella oxytoca (R: ampicillin) - treated with nitrofurantoin  c. History of multiple UTIs s/p treatment with antibiotics over 2022-23    3. Right upper lobe spiculated mass    History of Tuberculosis (treated 2010); per chart review: started on a 4-drug regimen given high concern for tuberculosis as she has had close contact exposures in the past. She had a grossly positive PPD to 20 cm. She was also connected with [facility].     Pulmonary consulted this hospitalization and following    4. Left femur fracture s/p mechanical fall s/p L hip hemiarthroplasty on 3/21 with Dr. Paulino    5. Leukocytosis; resolved    6. Lactic acidosis; resolved    DISCUSSION:     Patient is well-appearing today and per patient's son, appears to be feeling better and more energetic. Able to transfer more easily. Son and daughter in-law state patient had not had any fevers or apparent confusion prior to her fall. They do note she's been \"picking\" at things over the last several months, and feel it is related to her skin itching and eczema. Blood culture growing Paenibacillus species, an environmental bacteria, in one of four bottles; likely representative of contamination. Recommend repeating blood cultures today; recommend waiting a minimum of 30 minutes between collecting blood cultures; to help distinguish contaminants versus true bacteremia. " Urinary frequency is noted by family to be every 30 minutes to an hour with occasional blood-tinged/orange urine. Initial leukocytosis and lactic acidosis resolved. She has remained afebrile and hemodynamically stable throughout her hospitalization. She has had one-time dose of IV vancomycin, IV cefepime on the first day, and 4 days of IV ceftriaxone, along with a dose of IV cefazolin. Three days of IV antibiotics should be adequate for adequate for cystitis. No signs of pyelonephritis on imaging. Concern for bladder malignancy with recurrent blood in urinalysis and visualized by family. Recommend follow up with outpatient urology to help with further workup of recurrent UTIs and malignancy rule out. Agree with pulmonary consultation given findings on CT C/A/P with history of TB versus concern for malignancy. Will continue to follow blood cultures over the weekend.      RECOMMENDATION:  1. Ok to stop Ceftriaxone; has been treated 3 days with IV for cystitis; suspect asymptomatic bacteruria  2. Continue to follow repeat blood cultures  3. Monitor for worsening symptoms and labs off antibiotics  4. Recommend referral to urology outpatient to help with further workup with malignancy versus frequent recurrent UTIs given hematuria; will likely need cystoscopy for biopsy      Thank you for this consult. ID will continue to follow.     Dr. Hobbs will be on call for General ID - please page  via [facility] with questions over the weekend. Dr. Hobbs will continue care of this patient on Monday    Patient was discussed with Dr. Hbobs.     SHELL Galdamez, CNP  Infectious Diseases  Pager# [phone]  ________________________________________________________________    Consult Question: Positive blood culture.  Admission Diagnosis: Elevated troponin [R77.8]  Elevated lactic acid level [R79.89]  Fall, initial encounter [W19.XXXA]         History of Present Illness:   Uzbek-speaking; daughter-in-law translates for son  "and patient, HPI limited by patient's baseline mental status    This is a 88 year old old female with PMH stroke (1/2011, residual symptoms of hemiparesis of the left side upper and lower, chronic anticoagulation with Plavix), DM2 who presents to [facility] ED with left hip pain after a ground level fall on 3/20 sustaining left femur fracture, leukocytosis, 1/2 bottles in 1/2 sets blood cultures positive for GPBs with urinalysis suspicious for UTI and UC pending concerning for urosepsis.    ID is consulted for understanding significance of a positive blood culture.    Presented to [facility] on 3/20 status post mechanical fall, afebrile, and hemodynamically stable. Labs remarkable for leukocytosis. CMP unremarkable and lactic acid elevated at 3.9. Urinalysis positive for nitrites, moderate blood, large leuk esterase, few bacteria, >182 WBC and WBC clumps present. UC polymicrobial with GNBs, GPBs, GPBs resembling diptheroids. Blood cultures 1/2 bottles in 1/2 sets (3/20) with Paenibacillus species. Imaging with XR pelvis and left hip positive for acute fracture of left femoral neck with possible underlying lytic lesion. CT lumbar spine mentions L4-5 instrumentation without hardware failure. CT C/A/P with RUL spiculated mass noted on prior CT (2010) as a ground glass nodule concerning for slow growing adenocarcinoma vs infection or scarring from prior infection, calcified and noncalcified prominent mediastinal lymph nodes (possible 2/2 prior granulomatous disease vs malignancy), unchanged bladder wall thickening with \"faint surrounding fat stranding, suggestive of cystitis\".     Today, leukocytosis resolved, CMP unremarkable. Lactic acidosis resolved (3/21). Repeat blood cultures ordered (3/24).    Underwent left hip hemiarthroplasty 3/21 with Dr. Paulino.    ID history  UC (6/3/22) Kelbsiella oxytoca (R: ampicillin)    OSH ID history  UC: 1/12/23 - Staph epidermidis (pan-sens)  UC: 1/2/23 - Klebsiella pneumoniae (R: nitrofurantoin; " "I: Zosyn)  UC: 9/15/22 - Klebsiella pneumoniae (I: nitrofurantoin)  UC: 6/22/22 - Staph hominis (R: oxacillin)         Review of Systems:   12-point ROS obtained, pertinent positives and negatives per above         Past Medical History:     Past Medical History:   Diagnosis Date     Diabetes mellitus (H)      Unspecified cerebral artery occlusion with cerebral infarction             Past Surgical History:     Past Surgical History:   Procedure Laterality Date     BACK SURGERY       HEMIARTHROPLASTY HIP Left 3/21/2023    Procedure: Left Hip Hemiarthroplasty;  Surgeon: Jeffrey Paulino MD;  Location: [facility] OR     ORTHOPEDIC SURGERY              Family History:   Reviewed and non-contributory.   No family history on file.         Social History:     Social History     Tobacco Use     Smoking status: Never     Smokeless tobacco: Never   Substance Use Topics     Alcohol use: Not Currently     History   Sexual Activity     Sexual activity: Not Currently            Current Medications:       acetaminophen  975 mg Oral Q8H     amLODIPine  2.5 mg Oral Daily     atorvastatin  20 mg Oral Daily     cefTRIAXone  1 g Intravenous Q24H     enoxaparin ANTICOAGULANT  30 mg Subcutaneous Q24H     fexofenadine  90 mg Oral BID     insulin aspart  1-7 Units Subcutaneous TID AC     insulin aspart  1-5 Units Subcutaneous At Bedtime     insulin glargine  20 Units Subcutaneous BID     metFORMIN  500 mg Oral BID w/meals     mirtazapine  7.5 mg Oral At Bedtime     polyethylene glycol  17 g Oral Daily     senna-docusate  1 tablet Oral BID     triamcinolone   Topical BID            Allergies:     Allergies   Allergen Reactions     Aspirin Unknown     Irbesartan Unknown     Penicillins Other (See Comments)     Pt has some sort of reaction at dentist office after PEN supposedly. Pt cannot verify nor can family. Pt has no reaction to \"stephane\" products as was previously indicated. This was verified by familly. All this reviewed with opthalmalogist " and anestheiologist on 5/17/2018            Physical Exam:   Vitals were reviewed  Patient Vitals for the past 24 hrs:   BP Temp Temp src Pulse Resp SpO2   03/24/23 0754 (!) 157/55 96.8  F (36  C) Oral 79 15 99 %   03/23/23 1600 138/58 (!) 96.7  F (35.9  C) Oral 91 16 96 %   03/23/23 1257 127/59 -- -- -- -- --       Physical Examination:  GENERAL:  well-developed, well-nourished, in bed in no acute distress.   HEENT:  Head is normocephalic, atraumatic   EYES:  Eyes have anicteric sclerae without conjunctival injection or stigmata of endocarditis.    ENT:  Oropharynx is moist without exudates or ulcers. Tongue is midline  NECK:  Supple. No cervical lymphadenopathy  LUNGS:  Clear to auscultation bilateral.   CARDIOVASCULAR:  Regular rate and rhythm with no murmurs, gallops or rubs.  ABDOMEN:  Normal bowel sounds, soft, nontender. No appreciable hepatosplenomegaly  SKIN:  No acute rashes.  Line(s) are in place without any surrounding erythema or exudate. No stigmata of endocarditis.  NEUROLOGIC:  Grossly nonfocal. Active x4 extremities         Laboratory Data:     Inflammatory Markers  No lab results found.    Hematology Studies    Recent Labs   Lab Test 03/23/23  0655 03/22/23  0416 03/21/23 2203 03/20/23  1652 06/03/22  1524   WBC 10.1 12.8*  --  10.2 9.2   HGB 9.5* 10.1* 10.3* 11.4* 11.6*    95  --  104* 96    181  --  210 221       Metabolic Studies     Recent Labs   Lab Test 03/23/23  0655 03/22/23  0410 03/21/23  2204 03/21/23  0848 03/20/23  1652    132* 135* 137 140   POTASSIUM 3.8 3.8 3.6 3.7 4.0   CHLORIDE 104 99 100 101 107   CO2 25 18* 23 24 20*   BUN 15.1 12.8 10.2 10.9 17.2   CR 0.83 0.66 0.67 0.67 0.78   GFRESTIMATED 67 84 84 84 73       Hepatic Studies    Recent Labs   Lab Test 03/20/23  1652   BILITOTAL 0.4   ALKPHOS 43   ALBUMIN 3.5   AST 19   ALT 7*       Microbiology:  Culture   Date Value Ref Range Status   03/20/2023 10,000-50,000 CFU/mL Gram negative bacilli (A)  Final    03/20/2023 10,000-50,000 CFU/mL Gram positive bacilli (A)  Final   03/20/2023 <10,000 CFU/mL Gram negative bacilli (A)  Final   03/20/2023 (A)  Final    <10,000 CFU/mL Gram positive bacilli, resembling diphtheroids   03/20/2023 Positive on the 2nd day of incubation (A)  Preliminary   03/20/2023 Gram positive bacilli (AA)  Preliminary     Comment:     1 of 2 bottles   03/20/2023 No growth after 3 days  Preliminary   06/03/2022 >100,000 CFU/mL Klebsiella oxytoca (A)  Final       Urine Studies    Recent Labs   Lab Test 03/20/23  2245 06/03/22  1556   LEUKEST Large* Large*   WBCU >182* >182*       Vancomycin Levels  No lab results found.    Invalid input(s): VANCO    Hepatitis B Testing No lab results found.  Hepatitis C Testing   No results found for: HCVAB, HQTG, HCGENO, HCPCR, HQTRNA, HEPRNA  Respiratory Virus Testing    RSV Rapid Antigen Result   Date Value Ref Range Status   03/19/2010 Negative NEG Final             Imaging:   EXAM: XR PELVIS AND HIP LEFT 1 VIEW  LOCATION: [facility]  DATE/TIME: 3/20/2023 5:39 PM    IMPRESSION: Acute fracture of the left femoral neck with mild displacement and varus angulation. The margins of the fracture are ill-defined and an underlying lytic lesion is possible but not considered likely. CT could assess further.     There is diffuse bony demineralisation. Mild degenerative arthritis left hip joint. Sclerotic lesions involving the pubic bones are stable. Postoperative and degenerative changes in lumbar spine.      EXAMINATION: CT CHEST/ABDOMEN/PELVIS W CONTRAST, 3/20/2023 9:14 PM    IMPRESSION:   1. Right upper lobe spiculated mass. On review of prior from 2010  there was a groundglass nodule in the same region which could indicate  a slow growing adenocarcinoma. Differential include infection and  scarring from prior infection.  2. Unchanged calcified nodules at the left apex, likely old  granulomatous disease.  3. Calcified and  noncalcified prominent mediastinal lymph nodes. These  are possibly secondary to prior granulomatous disease although  superimposed malignant lymph nodes cannot be excluded.  4.. Visualized left femoral neck fracture with diffuse osteopenia.  Pathologic fracture not excluded given the findings in impression #1.  No additional suspicious osseous lesions are visualized.  5. Unchanged appearance of the wall thickening with faint surrounding  fat stranding, suggestive of cystitis. Correlate with urinalysis.  6. 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 Stable since prior CT of the abdomen dictated  6/3/2022.

## 2023-03-24 NOTE — CONSULTS
[facility] Pulmonology Consultation    Julio Grier  MRN: [id number]  : 1934    Date of Admission: 3/20/2023  Date of Service: 23    Reason for consult:     Requesting physician:        Assessment:  RUL mass, concern for TB  Incidental finding on CT CAP on 3/20 of spiculated mass of the RUL with some cavitations and consolidation. Was there in a prior CT as ground glass opacity. Had a history in  for TB that was treated for 4 months. Does not endorse sick exposures. Does not have any respiratory symptoms, and vitally stable, afebrile and normal WBC. Location of the mass in addition to the cavitations that it has along with her history is concerning for TB. Another differential is adenocarcinoma vs other malignancy with how it was a GGO and evolved slowly. Would need TB rule out with AFB cultures. If negative then would consider bronchoscopy.      Recommendations:  - Recommend AFB cultures and TB rule out  -Will consider a bronchoscopy inpt vs outpt if negative for TB    Steve Chavez MD  Internal Medicine, PGY2    Patient discussed and seen with Dr. Mchugh.    Attending note:  Patient seen, examined, and discussed with Dr. Chavez.  All data reviewed.  Agree with the assessment and plan as outlined in the above note.  Progressive RUL nodular infiltrate with GGO and calcification; overall suggestive of recurrent Tb.  Obtain sputum for AFB, if negative x2 will discuss with family bronchoscopy.    Becky Mchugh MD  [phone]        Chief complaint:  RUL spiculated mass      HPI:    Julio Grier is an 88 y.o. female with Hx CVA with right sided hemiplegia on plavix, DM2, Prior UTI, Hx of suspected TB treated with RIBE for 4 months was admitted after being found down off the commode. She was found to have a left femoral neck fracture. On her CT CAP there was an incidental finding of RUL mass with some cavitation that was concerning. Pt without pulmonary symptoms and denies cough, shortness of  breath, or chest pain. No fever or chills. Denies any history of night sweats. She has been in the [place] since [place], and has been traveling in and out until 2011 where she got her stroke. No history of smoking, or alcohol reported.     Review of systems: complete 10 point review of systems completed and negative except as noted above in HPI.    Medical history:    Past Medical History:   Diagnosis Date     Diabetes mellitus (H)      Unspecified cerebral artery occlusion with cerebral infarction        Social history:   Social History     Socioeconomic History     Marital status:      Spouse name: Not on file     Number of children: Not on file     Years of education: Not on file     Highest education level: Not on file   Occupational History     Not on file   Tobacco Use     Smoking status: Never     Smokeless tobacco: Never   Substance and Sexual Activity     Alcohol use: Not Currently     Drug use: Never     Sexual activity: Not Currently   Other Topics Concern     Not on file   Social History Narrative     Not on file     Social Determinants of Health     Financial Resource Strain: Not on file   Food Insecurity: Not on file   Transportation Needs: Not on file   Physical Activity: Not on file   Stress: Not on file   Social Connections: Not on file   Intimate Partner Violence: Not on file   Housing Stability: Not on file         Surgical history:    Past Surgical History:   Procedure Laterality Date     BACK SURGERY       HEMIARTHROPLASTY HIP Left 3/21/2023    Procedure: Left Hip Hemiarthroplasty;  Surgeon: Jeffrey Paulino MD;  Location: [facility] OR     ORTHOPEDIC SURGERY         Family history: I have reviewed family history in EMR.  No family history on file.      Immunizations:      Allergies:    Allergies   Allergen Reactions     Aspirin Unknown     Irbesartan Unknown     Penicillins Other (See Comments)     Pt has some sort of reaction at dentist office after PEN supposedly. Pt cannot verify nor can  "family. Pt has no reaction to \"stephane\" products as was previously indicated. This was verified by familly. All this reviewed with opthalmalogist and anestheiologist on 5/17/2018       Medications:    Current Facility-Administered Medications   Medication     acetaminophen (TYLENOL) tablet 650 mg    Or     acetaminophen (TYLENOL) solution 650 mg     acetaminophen (TYLENOL) tablet 975 mg     amLODIPine (NORVASC) tablet 2.5 mg     artificial tears (GENTEAL) 0.1-0.2-0.3 % ophthalmic solution 2 drop     atorvastatin (LIPITOR) tablet 20 mg     cefTRIAXone (ROCEPHIN) 1 g vial to attach to  mL bag for ADULTS or NS 50 mL bag for PEDS     glucose gel 15-30 g    Or     dextrose 50 % injection 25-50 mL    Or     glucagon injection 1 mg     enoxaparin ANTICOAGULANT (LOVENOX) injection 30 mg     fexofenadine (ALLEGRA) suspension 90 mg     hydrALAZINE (APRESOLINE) injection 10 mg     hydrocortisone (CORTAID) 1 % cream     HYDROmorphone (DILAUDID) injection 0.2 mg     insulin aspart (NovoLOG) injection (RAPID ACTING)     insulin aspart (NovoLOG) injection (RAPID ACTING)     insulin glargine (LANTUS PEN) injection 20 Units     metFORMIN (GLUCOPHAGE) tablet 500 mg     methocarbamol (ROBAXIN) tablet 500 mg     mirtazapine (REMERON) tablet TABS 7.5 mg     naloxone (NARCAN) injection 0.2 mg    Or     naloxone (NARCAN) injection 0.4 mg    Or     naloxone (NARCAN) injection 0.2 mg    Or     naloxone (NARCAN) injection 0.4 mg     ondansetron (ZOFRAN ODT) ODT tab 4 mg    Or     ondansetron (ZOFRAN) injection 4 mg     oxyCODONE IR (ROXICODONE) half-tab 2.5-5 mg     polyethylene glycol (MIRALAX) Packet 17 g     senna-docusate (SENOKOT-S/PERICOLACE) 8.6-50 MG per tablet 1 tablet     senna-docusate (SENOKOT-S/PERICOLACE) 8.6-50 MG per tablet 1 tablet    Or     senna-docusate (SENOKOT-S/PERICOLACE) 8.6-50 MG per tablet 2 tablet     triamcinolone (KENALOG) 0.1 % cream         Objective:    BP (!) 157/55 (BP Location: Right arm)   Pulse 79  " " Temp 96.8  F (36  C) (Oral)   Resp 15   Ht 1.575 m (5' 2.01\")   Wt 40.8 kg (89 lb 15.2 oz)   SpO2 99%   BMI 16.45 kg/m      Gen: in no acute distress, sitting upright in bed  HEENT: MMM, no oral lesions appreciated  CV: RRR, no murmurs appreciated, extremities warm, no peripheral edema  Pulm: no increased work of breathing, CTAB  GI: soft, not distended  MSK: no gross deformities, no joint swelling  Integ: no rashes or lesions appreciated  Neuro: speech clear, alert and oriented  Psych: calm, appropriate    Data:    I have personally reviewed laboratory data.  I have personally reviewed imaging available.   I have personally reviewed cardiac studies.   "

## 2023-03-24 NOTE — PROVIDER NOTIFICATION
FYI pt in 89 Y.O. just received critical result from microbiology lab stating pt's blood cultures from L) arm on 03/20 are now growing gram positive bacilli. (Not my pt but just helping the nurse out) Yin ROBB [id number]

## 2023-03-24 NOTE — PROGRESS NOTES
"Orthopaedic Surgery Progress Note     Subjective: No acute events overnight. Pain well controlled. Tolerating PO.      Objective: /58 (BP Location: Right arm)   Pulse 91   Temp (!) 96.7  F (35.9  C) (Oral)   Resp 16   Ht 1.575 m (5' 2.01\")   Wt 40.8 kg (89 lb 15.2 oz)   SpO2 96%   BMI 16.45 kg/m      General: NAD, alert and oriented, cooperative with exam.   Cardio: RRR, extremities wwp.   Respiratory: Non-labored breathing.  MSK:   - LLE: Dressing c/d/i. Wiggles toes. Exam limited by no      Labs:  Hemoglobin   Date Value Ref Range Status   03/23/2023 9.5 (L) 11.7 - 15.7 g/dL Final   01/01/2013 13.1 11.7 - 15.7 g/dL Final   ]  All cultures:  No results for input(s): CULT in the last 168 hours.    Assessment and Plan: Julio Grier is a 88 year old female now s/p L hip hemiarthroplasty on 3/21 with Dr. Paulino.      Medicine Primary  Activity: Up with assist until independent. Posterior hip precautions   Weight bearing status: WBAT  Pain management: Transition from IV to PO as tolerated.    Diet: Begin with clear fluids and progress diet as tolerated.   DVT prophylaxis: lovenox and mechanical while in the hospital, discharge on lovenox x 4 weeks.  Imaging: AP/Lat L hip in PACU   Labs: Hgb POD#1.  Bracing/Splinting: Abduction pillow at rest and if confused   Dressings: Keep clean, dry and intact x 7 days.   Elevation: Elevate LLE on pillows to keep above the level of the heart as much as possible.   Physical Therapy/Occupational Therapy: Eval and treat.     Follow-up: Clinic with wound nurse in 2 weeks for suture removal no x-rays needed.   Disposition: Pending progress with therapies, pain control on orals, and medical stability, anticipate discharge to home on POD #2-3.     Rosa Ernandez MD  Orthopaedic Surgery Resident, PGY-4    "

## 2023-03-24 NOTE — PROGRESS NOTES
Brief Medicine Cross-Cover Note:    Was notified by RN regarding one bottle of one set blood cultures from admission positive for gram positive bacilli. Patient is clinically stable with improved WBC count, normalized lactate, no fevers, and BP stable.   - Repeat blood cultures ordered   - Continue current antibiotics (Rocephin) while awaiting speciation   - Consider ID consult in am     Flynn Villavicencio PA-C on 3/23/2023 at 9:44 PM

## 2023-03-24 NOTE — DISCHARGE INSTRUCTIONS
YOUNG and Son.    10/5/18    ERIKA would benefit from a horizontal or vertical grab bar placed so she can stand independently.      You would need to buy one and have it installed and have permission from the [facility].  She did very well with it.    Continue all home exercises.      Shannon BEGUM    
no

## 2023-03-24 NOTE — PROGRESS NOTES
Care Management Follow Up    Length of Stay (days): 4    Expected Discharge Date: 03/27/2023     Concerns to be Addressed: home care  Patient plan of care discussed at interdisciplinary rounds: Yes    Anticipated Discharge Disposition:  Home w/ family and home health care services; unlikely to need IV abx at discharge (per med provider) but ID is following     Anticipated Discharge Services:  Home PT/OT/HHA and resumption of skilled nursing  Anticipated Discharge DME:  TBD    Patient/family educated on Medicare website which has current facility and service quality ratings:  no  Education Provided on the Discharge Plan:  Have not yet updated pt or family that home PT/OT/HHA has been retained.  They are aware that skilled nursing will resume.  Patient/Family in Agreement with the Plan:  TBD    Referrals Placed by CM/SW:  None (Pt open to home care already)    Additional Information:  During rounds this AM, provider stated pt is estimated to be discharge-ready in approximately 2 days.    10:50am - This writer was verbally notified by therapy team member that home PT/OT/HHA would be of benefit for this pt.    This writer sees ID is consulted for this pt.  Contacted ortho provider, inquired if pt will require IV abx at discharge or if it is too early to tell.  Ortho stated medicine is primary.    11:05am - Paged medicine provider, received call back.  Inquired about ID situation.  Provider stated pt may not require abx, and if she does, it would likely be orals, but it is too early to tell and ID needs to weigh in.    12:42pm - Called [facility]. (ph:[phone] fx:[phone]).  Spoke w/ intake, informed them pt is estimated to be discharge-ready by Sunday or Monday.  Inquired if pt can receive PT, OT, and HHA in addition to the skilled nursing for med mgmt that she already receives.  Representative stated that it should not be a problem and that this writer can send orders for those additional services at  "discharge.    Resumption of care order and order for home PT/OT/HHA placed \"per staff recommendation: signature required\".  Will need to send signed orders to [facility] at discharge.    Pt may need a ride arranged at discharge.          RNCC will continue to follow.    Doyle Celaya, RNCC    "

## 2023-03-24 NOTE — PROGRESS NOTES
"[facility], [place]   Internal Medicine Daily Note           Interval History/Events     Overnight events reviewed  Family at bedside.  Patient reports no nausea vomiting chest pain shortness of breath.  Reports pain is controlled           Review of Systems        4 point ROS including Respiratory, CV, GI and , other than that noted above is negative      Medications   I have reviewed current medications  in the \"current medication\" section of Epic.  Relevant changes include:     Physical Exam   General:       Vital signs:    Blood pressure (!) 157/55, pulse 79, temperature 96.8  F (36  C), temperature source Oral, resp. rate 15, height 1.575 m (5' 2.01\"), weight 40.8 kg (89 lb 15.2 oz), SpO2 99 %.  Estimated body mass index is 16.45 kg/m  as calculated from the following:    Height as of this encounter: 1.575 m (5' 2.01\").    Weight as of this encounter: 40.8 kg (89 lb 15.2 oz).      Intake/Output Summary (Last 24 hours) at 3/23/2023 1148  Last data filed at 3/23/2023 0100  Gross per 24 hour   Intake 655 ml   Output 945 ml   Net -290 ml        Constitutional: Laying in bed in no acute distress  Eye: No icterus no pallor  Mouth/ENT: Normal oral mucosa  Cardiovascular: S1-S2 normal  Respiratory: Bilateral CTA  GI: Soft non tender, bowel sound positive  :   Neurology: Alert awake oriented  Psych:   MSK:   Integumentary:   Heme/Lymph/Imm:      Laboratory and Imaging Studies     I have reviewed  laboratory and imaging studies in the Epic. Pertinent findings are as below:    BMP  Recent Labs   Lab 03/24/23  1249 03/24/23  1154 03/24/23  0759 03/23/23  2156 03/23/23  0736 03/23/23  0655 03/22/23  0759 03/22/23  0410 03/21/23  2316 03/21/23  2204     --   --   --   --  140  --  132*  --  135*   POTASSIUM 4.0  --   --   --   --  3.8  --  3.8  --  3.6   CHLORIDE 105  --   --   --   --  104  --  99  --  100   MARY 8.7*  --   --   --   --  8.7*  --  8.1*  --  8.6*   CO2 23  --   --   --   " --  25  --  18*  --  23   BUN 16.0  --   --   --   --  15.1  --  12.8  --  10.2   CR 0.83  --   --   --   --  0.83  --  0.66  --  0.67   * 201* 128* 202*   < > 185*   < > 261*   < > 166*    < > = values in this interval not displayed.     CBC  Recent Labs   Lab 03/24/23  1249 03/23/23  0655 03/22/23  0416 03/21/23  2203 03/20/23  1652   WBC 9.5 10.1 12.8*  --  10.2   RBC 2.59* 2.89* 3.04*  --  3.40*   HGB 8.8* 9.5* 10.1*   < > 11.4*   HCT 26.3* 28.8* 29.0*  --  35.2   * 100 95  --  104*   MCH 34.0* 32.9 33.2*  --  33.5*   MCHC 33.5 33.0 34.8  --  32.4   RDW 14.8 14.5 14.0  --  14.8    165 181  --  210    < > = values in this interval not displayed.     INR  Recent Labs   Lab 03/20/23  1652   INR 1.06     LFTs  Recent Labs   Lab 03/20/23  1652   ALKPHOS 43   AST 19   ALT 7*   BILITOTAL 0.4   PROTTOTAL 6.0*   ALBUMIN 3.5      PANC  Recent Labs   Lab 03/20/23  1652   LIPASE 12*           Impression/Plan        88 year old Malay speaking female with a medical history significant for 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 Suspicion for urosepsis given hypotension and tachycardia and UA positive for nitrites, leukocyte esterase and large amounts of WBCs. Patient transfer to [facility] for Left Hip Arthroplasty. Surgical procedure was uncomplicated and patient is in stable condition.         # Hx of CVA c/b Hemiparesis and Hemiplegia  - Noted some h/o dysphagia in the chart.   - consult  SLP to see.   - Aspiration precautions for now. Clear liquid diet until SLP can evaluate.   - PTA Plavix 75mg daily on hold; resume  when okay with surgery   - Maintain circadian rhythm. Lights on during the day. Off at night, minimize cares at night.  OOB during the day.  - cont Mirtazapine at bedtime        # No acute issues   - Acapella for aggressive pulmonary  toileting      # HTN   # HLD  Arterial line has been removed  - resume  Amlodipine   - hold fish oil  - continue PTA Lipitor 20mg daily        # Hyponatremia  # Hypomagnesia   - monitor for now   - Discontinue LR  for IV fluid hydration.   - electrolyte replacement protocol  In place.      #  Hx of urinary retention  - hold oxybutynin for now      # Stress hyperglycemia superimposed on   # Diabetes Mellitus type II   PTA medications: Glipizide, Metformin, Insulin Glargine 40/32  Blood glucose now starting to run high.  Patient was started on Lantus 20 units twice daily  - increase  Lantus to 30 unitstwice daily  -Restart metformin  -Insulin aspart medium intensity correction before meals and bedtime  -Continue to monitor and titrate     # Concern for Urosepsis at time of admit with elevated lactate, initially 4.9  received IV Vancomycin and Cefepime x 1. Lactate initially 4.9-->1.1   >  3/20 UA: pyuria, +nitrates. UC with multiple microorganism: Gram-negative bacilli, positive bacilli, gram-negative bacilli, gram-positive bacilli, resembling diphtheroids.   Urine culture is likely contaminant  > 3/20 BC:  NGTD   > White count is normal on 3/23/2023  - Continue Rocephin for 5 days    # Positive blood culture from 3/20/2023: Blood culture from Paenibacillus species.    - Repeat blood culture  - ID consult.       # Acute blood loss anemia due to fracture and  surgical blood loss   > Threshold for transfusion if hgb <7.0 or signs/symptoms of hypoperfusion.     > EBL was reported at 100cc  > Hemoglobin level at 9.5 g/dL  -Transfuse if hemoglobin less than 7 or signs of hypoperfusion       # Left femoral neck fracture due to mechanical fall   # S/p LEFT hip hemiarthroplasty on 3/21   Regional anesthesia placed left iliac fascia block at [place].  Head CT negative for trauma x 2. trauma eval completed [place] [facility] [place]   -  Fall precautions  - PT/OT.  Appreciate PT OT recommendations  - Posterior hip  precautions. WBAT. Up with assist     # Left hemiparesis due to stroke/CVA:    Due to hx of CVA, does stand and transfer     # Right upper lobe spiculated mass:    Seen  on the CT scan on 3/20/2023.  Pulmonology consulted and case discussed.  Pulmonary has advised to rule out tuberculosis.  -Airborne isolation placed  -Sputum AFB ordered x3 days  -Pulmonary will give further recommendations after seeing the patient       DVT Prophylaxis: Defer to primary service, confer with orthopedics re lovenox/heparin   GI Prophylaxis: Not indicated  Restraints: Restraints for medical healing needed: NO     Lines/ tubes/ drains:  - PIV's  - Modi     Disposition:  - Transfer to floor, no ICU needs        Pt's care was discussed with bedside RN, patient and  during Care Team Rounds.               Aristeo Beck MD  Hospitalist ( Internal medicine)  Pager: [phone]

## 2023-03-24 NOTE — PLAN OF CARE
PT: per discussion with OT, pt non-ambulatory at baseline, patient transfers with family assist. OT following for caregiver training, no acute PT needs as needs can be met with single discipline. PT will complete orders, OT will continue to follow.

## 2023-03-25 LAB
ANION GAP SERPL CALCULATED.3IONS-SCNC: 8 MMOL/L (ref 7–15)
BACTERIA BLD CULT: ABNORMAL
BACTERIA BLD CULT: ABNORMAL
BACTERIA BLD CULT: NO GROWTH
BUN SERPL-MCNC: 12.7 MG/DL (ref 8–23)
CALCIUM SERPL-MCNC: 8.7 MG/DL (ref 8.8–10.2)
CHLORIDE SERPL-SCNC: 106 MMOL/L (ref 98–107)
CREAT SERPL-MCNC: 0.8 MG/DL (ref 0.51–0.95)
DEPRECATED HCO3 PLAS-SCNC: 25 MMOL/L (ref 22–29)
ERYTHROCYTE [DISTWIDTH] IN BLOOD BY AUTOMATED COUNT: 14.6 % (ref 10–15)
GFR SERPL CREATININE-BSD FRML MDRD: 70 ML/MIN/1.73M2
GLUCOSE BLDC GLUCOMTR-MCNC: 123 MG/DL (ref 70–99)
GLUCOSE BLDC GLUCOMTR-MCNC: 162 MG/DL (ref 70–99)
GLUCOSE BLDC GLUCOMTR-MCNC: 163 MG/DL (ref 70–99)
GLUCOSE SERPL-MCNC: 70 MG/DL (ref 70–99)
GLUCOSE SERPL-MCNC: 70 MG/DL (ref 70–99)
HCT VFR BLD AUTO: 26.5 % (ref 35–47)
HGB BLD-MCNC: 8.7 G/DL (ref 11.7–15.7)
MAGNESIUM SERPL-MCNC: 1.5 MG/DL (ref 1.7–2.3)
MAGNESIUM SERPL-MCNC: 1.6 MG/DL (ref 1.7–2.3)
MCH RBC QN AUTO: 33.1 PG (ref 26.5–33)
MCHC RBC AUTO-ENTMCNC: 32.8 G/DL (ref 31.5–36.5)
MCV RBC AUTO: 101 FL (ref 78–100)
PHOSPHATE SERPL-MCNC: 3.5 MG/DL (ref 2.5–4.5)
PLATELET # BLD AUTO: 211 10E3/UL (ref 150–450)
POTASSIUM SERPL-SCNC: 4.2 MMOL/L (ref 3.4–5.3)
RBC # BLD AUTO: 2.63 10E6/UL (ref 3.8–5.2)
SODIUM SERPL-SCNC: 139 MMOL/L (ref 136–145)
WBC # BLD AUTO: 9.5 10E3/UL (ref 4–11)

## 2023-03-25 PROCEDURE — 82310 ASSAY OF CALCIUM: CPT

## 2023-03-25 PROCEDURE — 250N000011 HC RX IP 250 OP 636: Performed by: PHYSICIAN ASSISTANT

## 2023-03-25 PROCEDURE — 250N000013 HC RX MED GY IP 250 OP 250 PS 637: Performed by: INTERNAL MEDICINE

## 2023-03-25 PROCEDURE — 250N000013 HC RX MED GY IP 250 OP 250 PS 637: Performed by: PHYSICIAN ASSISTANT

## 2023-03-25 PROCEDURE — 120N000002 HC R&B MED SURG/OB UMMC

## 2023-03-25 PROCEDURE — 250N000013 HC RX MED GY IP 250 OP 250 PS 637: Performed by: NURSE PRACTITIONER

## 2023-03-25 PROCEDURE — 250N000009 HC RX 250: Performed by: INTERNAL MEDICINE

## 2023-03-25 PROCEDURE — 250N000013 HC RX MED GY IP 250 OP 250 PS 637: Performed by: SURGERY

## 2023-03-25 PROCEDURE — 250N000013 HC RX MED GY IP 250 OP 250 PS 637: Performed by: STUDENT IN AN ORGANIZED HEALTH CARE EDUCATION/TRAINING PROGRAM

## 2023-03-25 PROCEDURE — 36415 COLL VENOUS BLD VENIPUNCTURE: CPT | Performed by: INTERNAL MEDICINE

## 2023-03-25 PROCEDURE — 85014 HEMATOCRIT: CPT

## 2023-03-25 PROCEDURE — 83735 ASSAY OF MAGNESIUM: CPT | Performed by: INTERNAL MEDICINE

## 2023-03-25 PROCEDURE — 36415 COLL VENOUS BLD VENIPUNCTURE: CPT

## 2023-03-25 PROCEDURE — 84100 ASSAY OF PHOSPHORUS: CPT | Performed by: INTERNAL MEDICINE

## 2023-03-25 PROCEDURE — 99232 SBSQ HOSP IP/OBS MODERATE 35: CPT | Performed by: INTERNAL MEDICINE

## 2023-03-25 RX ORDER — MAGNESIUM SULFATE HEPTAHYDRATE 40 MG/ML
2 INJECTION, SOLUTION INTRAVENOUS ONCE
Status: DISCONTINUED | OUTPATIENT
Start: 2023-03-25 | End: 2023-03-25

## 2023-03-25 RX ORDER — SODIUM CHLORIDE FOR INHALATION 7 %
10 VIAL, NEBULIZER (ML) INHALATION DAILY
Status: COMPLETED | OUTPATIENT
Start: 2023-03-25 | End: 2023-03-27

## 2023-03-25 RX ORDER — MAGNESIUM OXIDE 400 MG/1
400 TABLET ORAL 2 TIMES DAILY
Status: DISCONTINUED | OUTPATIENT
Start: 2023-03-25 | End: 2023-03-31 | Stop reason: HOSPADM

## 2023-03-25 RX ORDER — SODIUM CHLORIDE FOR INHALATION 10 %
10 VIAL, NEBULIZER (ML) INHALATION DAILY
Status: DISCONTINUED | OUTPATIENT
Start: 2023-03-25 | End: 2023-03-25

## 2023-03-25 RX ADMIN — METFORMIN HYDROCHLORIDE 500 MG: 500 TABLET ORAL at 07:48

## 2023-03-25 RX ADMIN — HYDROCORTISONE: 1 CREAM TOPICAL at 17:16

## 2023-03-25 RX ADMIN — ATORVASTATIN CALCIUM 20 MG: 20 TABLET, FILM COATED ORAL at 07:49

## 2023-03-25 RX ADMIN — ACETAMINOPHEN 975 MG: 325 TABLET, FILM COATED ORAL at 23:02

## 2023-03-25 RX ADMIN — TRIAMCINOLONE ACETONIDE: 1 CREAM TOPICAL at 19:38

## 2023-03-25 RX ADMIN — METFORMIN HYDROCHLORIDE 500 MG: 500 TABLET ORAL at 17:08

## 2023-03-25 RX ADMIN — FEXOFENADINE HYDROCHLORIDE 90 MG: 30 SUSPENSION ORAL at 19:36

## 2023-03-25 RX ADMIN — MAGNESIUM OXIDE TAB 400 MG (241.3 MG ELEMENTAL MG) 400 MG: 400 (241.3 MG) TAB at 19:37

## 2023-03-25 RX ADMIN — AMLODIPINE BESYLATE 2.5 MG: 2.5 TABLET ORAL at 07:50

## 2023-03-25 RX ADMIN — POTASSIUM & SODIUM PHOSPHATES POWDER PACK 280-160-250 MG 1 PACKET: 280-160-250 PACK at 02:53

## 2023-03-25 RX ADMIN — ENOXAPARIN SODIUM 30 MG: 30 INJECTION SUBCUTANEOUS at 17:08

## 2023-03-25 RX ADMIN — SENNOSIDES AND DOCUSATE SODIUM 1 TABLET: 50; 8.6 TABLET ORAL at 07:48

## 2023-03-25 RX ADMIN — HYDROCORTISONE: 1 CREAM TOPICAL at 06:00

## 2023-03-25 RX ADMIN — MIRTAZAPINE 7.5 MG: 7.5 TABLET, FILM COATED ORAL at 23:03

## 2023-03-25 RX ADMIN — Medication 10 ML: at 15:24

## 2023-03-25 RX ADMIN — ACETAMINOPHEN 975 MG: 325 TABLET, FILM COATED ORAL at 17:02

## 2023-03-25 RX ADMIN — SENNOSIDES AND DOCUSATE SODIUM 1 TABLET: 50; 8.6 TABLET ORAL at 19:38

## 2023-03-25 RX ADMIN — FEXOFENADINE HYDROCHLORIDE 90 MG: 30 SUSPENSION ORAL at 07:59

## 2023-03-25 RX ADMIN — POLYETHYLENE GLYCOL 3350 17 G: 17 POWDER, FOR SOLUTION ORAL at 07:47

## 2023-03-25 RX ADMIN — POTASSIUM & SODIUM PHOSPHATES POWDER PACK 280-160-250 MG 1 PACKET: 280-160-250 PACK at 00:10

## 2023-03-25 RX ADMIN — ACETAMINOPHEN 975 MG: 325 TABLET, FILM COATED ORAL at 00:10

## 2023-03-25 RX ADMIN — MAGNESIUM OXIDE TAB 400 MG (241.3 MG ELEMENTAL MG) 400 MG: 400 (241.3 MG) TAB at 10:58

## 2023-03-25 RX ADMIN — SENNOSIDES AND DOCUSATE SODIUM 2 TABLET: 50; 8.6 TABLET ORAL at 17:08

## 2023-03-25 RX ADMIN — BISACODYL 10 MG: 10 SUPPOSITORY RECTAL at 08:06

## 2023-03-25 RX ADMIN — TRIAMCINOLONE ACETONIDE: 1 CREAM TOPICAL at 07:51

## 2023-03-25 RX ADMIN — ACETAMINOPHEN 975 MG: 325 TABLET, FILM COATED ORAL at 07:49

## 2023-03-25 ASSESSMENT — ACTIVITIES OF DAILY LIVING (ADL)
ADLS_ACUITY_SCORE: 76
ADLS_ACUITY_SCORE: 74
ADLS_ACUITY_SCORE: 76
ADLS_ACUITY_SCORE: 76
ADLS_ACUITY_SCORE: 74
ADLS_ACUITY_SCORE: 76

## 2023-03-25 NOTE — PROGRESS NOTES
"[facility], [place]   Internal Medicine Daily Note           Interval History/Events     No acute concerns per patient or family.  Pain controlled.  Family feels patient is close to being able to be managed at home.  No reported cough, chest pain, shortness of breath.  Per nursing staff, patient has not been able to bring up any sputum.         Review of Systems        4 point ROS including Respiratory, CV, GI and , other than that noted above is negative      Medications   I have reviewed current medications  in the \"current medication\" section of Epic.  Relevant changes include:     Physical Exam   General:       Vital signs:    Blood pressure 132/62, pulse 77, temperature (!) 96.1  F (35.6  C), temperature source Oral, resp. rate 16, height 1.575 m (5' 2.01\"), weight 40.8 kg (89 lb 15.2 oz), SpO2 97 %.  Estimated body mass index is 16.45 kg/m  as calculated from the following:    Height as of this encounter: 1.575 m (5' 2.01\").    Weight as of this encounter: 40.8 kg (89 lb 15.2 oz).          Constitutional: Laying in bed in no acute distress  Mouth/ENT: Normal oral mucosa  Cardiovascular: S1-S2 normal  Respiratory: Clear bilaterally  GI: Soft non tender, bowel sound positive  :   Neurology: Alert awake oriented, left hemiplegia  No edema  Left hip incision was not examined by me.     Laboratory and Imaging Studies     I have reviewed  laboratory and imaging studies in the Epic. Pertinent findings are as below:    BMP  Recent Labs   Lab 03/25/23  0551 03/24/23  2136 03/24/23  1722 03/24/23  1249 03/23/23  0736 03/23/23  0655 03/22/23  0759 03/22/23  0410     --   --  138  --  140  --  132*   POTASSIUM 4.2  --   --  4.0  --  3.8  --  3.8   CHLORIDE 106  --   --  105  --  104  --  99   MARY 8.7*  --   --  8.7*  --  8.7*  --  8.1*   CO2 25  --   --  23  --  25  --  18*   BUN 12.7  --   --  16.0  --  15.1  --  12.8   CR 0.80  --   --  0.83  --  0.83  --  0.66   GLC 70  70 145* " 241* 242*   < > 185*   < > 261*    < > = values in this interval not displayed.     CBC  Recent Labs   Lab 03/25/23  0551 03/24/23  1249 03/23/23  0655 03/22/23  0416   WBC 9.5 9.5 10.1 12.8*   RBC 2.63* 2.59* 2.89* 3.04*   HGB 8.7* 8.8* 9.5* 10.1*   HCT 26.5* 26.3* 28.8* 29.0*   * 102* 100 95   MCH 33.1* 34.0* 32.9 33.2*   MCHC 32.8 33.5 33.0 34.8   RDW 14.6 14.8 14.5 14.0    172 165 181     INR  Recent Labs   Lab 03/20/23  1652   INR 1.06     LFTs  Recent Labs   Lab 03/20/23  1652   ALKPHOS 43   AST 19   ALT 7*   BILITOTAL 0.4   PROTTOTAL 6.0*   ALBUMIN 3.5      PANC  Recent Labs   Lab 03/20/23  1652   LIPASE 12*           Impression/Plan        88 year old Malay speaking female with a medical history significant for 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 Suspicion for urosepsis given hypotension and tachycardia and UA positive for nitrites, leukocyte esterase and large amounts of WBCs. Patient transfer to [facility] for [facility] [place] [facility]. Surgical procedure was uncomplicated and patient is in stable condition.         # Hx of CVA c/b Hemiparesis and Hemiplegia  -Stable neuro status.  Functional status improving to the point that family feels they can manage her at home  -     # HTN   # HLD  Stable on resumed amlodipine.  Lipitor         # Hyponatremia, resolved  # Hypomagnesia, mild  Plan: Oral magnesium replacement    #  Hx of urinary retention  History of recurrent UTI  Positive urine culture on admission likely representing contaminant  Status post 5-day course of Rocephin  No further treatment indicated.  Family to consider outpatient urology evaluation  Bowel regimen as patient has been constipated since surgery       # Stress hyperglycemia superimposed on   # Diabetes Mellitus type II   Fair control.,  On Lantus 30 units twice daily and  metformin.  Not back on  Glipizide  Plan: Continue to monitor    # Positive blood culture from 3/20/2023: Blood culture from Paenibacillus species.    F/u BC neg, likely contaminant      # Acute blood loss anemia due to fracture and  surgical blood loss   Hgb 8.7, stable  Plan monitor       # Left femoral neck fracture due to mechanical fall   # S/p LEFT hip hemiarthroplasty on 3/21   Regional anesthesia placed left iliac fascia block at [place].  Head CT negative for trauma x 2. trauma eval completed prior [place] [facility] [place]   -  Fall precautions  - PT/OT.  Appreciate PT OT recommendations  - Posterior hip precautions. WBAT. Up with assist       # Right upper lobe spiculated mass:    Seen  on the CT scan on 3/20/2023.  Pulmonology consulted and case discussed.  Pulmonary has advised to rule out tuberculosis.  -Airborne isolation placed  -Sputum AFB ordered x3 days (will need to induce)  -Pulmonary will give further recommendations after seeing the patient  Family and patient would like to have bronchoscopy performed during hospitalization if possible     DVT Prophylaxis: Defer to primary service, confer with orthopedics re lovenox/heparin   Enoxaparin per orthopedics      Pt's care was discussed with bedside RN, patient and  during Care Team Rounds.         Spencer Colin MD

## 2023-03-25 NOTE — PLAN OF CARE
"1900-0730  VS: BP (!) 142/74 (BP Location: Right arm, Patient Position: Semi-Steward's, Cuff Size: Adult Small)   Pulse 94   Temp (!) 96.1  F (35.6  C) (Axillary)   Resp 16   Ht 1.575 m (5' 2.01\")   Wt 40.8 kg (89 lb 15.2 oz)   SpO2 96%   BMI 16.45 kg/m      O2: SpO2 > 90% and stable on RA. LS clear and equal bilaterally. Denies chest pain and SOB.    Output: Incont at times, briefs in place. Uses BSC/bedpan at home.   Last BM: 3/19, reporting intermittent abdominal discomfort with yawning and constipation. Soft and nontender during palpation. BS active, +fl. Sched senna given.    Activity: NOOB this shift, turned and repositioned. A2. Non-ambulatory at baseline.   Bed alarm.   Skin: WDL except, surgical incision to L hip. Generalized rash (baseline per son)- reporting generalized itching, sched and PRN creams applied.     Pain: Minimal pain reported. Scheduled Tylenol given. Pt declined ice packs.    CMS: Intact, Disoriented to situation and time. Denies numbness and tingling.  Hx stroke, L sided hemiparesis and hemiplegia.    Dressing: Surgical dressing, dried drainage marked with no change. CDI. Sacral mepilex on for protection.    Diet: Breakfast already ordered for this AM. Soft and bite sized diet (lvl 6), slightly thickened liquids (lvl 1). Denies nausea/vomiting. Poor appetite, didn't touch dinner tray. Offering sips of water/juice and snacks with each interaction.   Pills crushed and given with applesauce/pudding.   BG checks before meals and at bedtime. BG check at bedtime 145, BG overnight 100. Per son request, BG check this AM 70   LDA: L and R PIV SL   Equipment: IV pole, personal belongings, abd pillow, PCDs.    Plan: Bed alarm. Airborne, fall, and aspiration precautions maintained. Continue with plan of care. Call light within reach, pt needs anticipated.     Additional Info: -Son and daughter in law very involved in pt care and helpful with translating.  Pt Frisian speaking.  iPad in " room.   -RN replacement protocol- K+ WNL. Phos replaced this shift, recheck this AM WNL. Mag this AM 1.5, replacement ordered.  -No cough this shift, unable to collect sputum sample for AFBC. Ruling out TB, Pulm following.

## 2023-03-25 NOTE — PROGRESS NOTES
"  VS: /62 (BP Location: Right arm, Patient Position: Semi-Steward's, Cuff Size: Adult Small)   Pulse 77   Temp (!) 96.1  F (35.6  C) (Oral)   Resp 16   Ht 1.575 m (5' 2.01\")   Wt 40.8 kg (89 lb 15.2 oz)   SpO2 97%   BMI 16.45 kg/m     O2: Room air saturations 97%. Continues to be in isolation for rule out for TB   Output: Pt is incontinent of bowel and bladder   Last BM: Last Sunday. Gave patient bowel med's and suppository this am .   Activity: Pt at baseline has left sided weakness due to previous stroke. Pt at baseline in home with her son only gets up to chair and commode. Spends a lot of her time in the bed.    Skin: Pt had previous condition on bottom area that is covered with meplix   Pain: Pt denied pain this am.    CMS:    Dressing: Left hip dressing has shadowing on it   Diet: Small bites of food with thick it in fluids.    LDA:    Equipment: Isolation, Kyrgyz speaking ( interpretor ipad in room) Son and daughter in law do a lot of interpreting at bedside. Very helpful.    Plan: Pt to continue to be monitored.    Additional Info: Pt normally lives at home with son and daughter in law. They are hoping to have her come back home with them. Pt is very pleasant and cooperative. Status of patient will continue to be monitored.        "

## 2023-03-26 ENCOUNTER — APPOINTMENT (OUTPATIENT)
Dept: OCCUPATIONAL THERAPY | Facility: CLINIC | Age: 88
DRG: 521 | End: 2023-03-26
Payer: COMMERCIAL

## 2023-03-26 LAB
ANION GAP SERPL CALCULATED.3IONS-SCNC: 11 MMOL/L (ref 7–15)
BUN SERPL-MCNC: 15.9 MG/DL (ref 8–23)
CALCIUM SERPL-MCNC: 9.6 MG/DL (ref 8.8–10.2)
CHLORIDE SERPL-SCNC: 104 MMOL/L (ref 98–107)
CREAT SERPL-MCNC: 0.77 MG/DL (ref 0.51–0.95)
DEPRECATED HCO3 PLAS-SCNC: 26 MMOL/L (ref 22–29)
ERYTHROCYTE [DISTWIDTH] IN BLOOD BY AUTOMATED COUNT: 14.6 % (ref 10–15)
GFR SERPL CREATININE-BSD FRML MDRD: 74 ML/MIN/1.73M2
GLUCOSE BLDC GLUCOMTR-MCNC: 114 MG/DL (ref 70–99)
GLUCOSE BLDC GLUCOMTR-MCNC: 130 MG/DL (ref 70–99)
GLUCOSE BLDC GLUCOMTR-MCNC: 168 MG/DL (ref 70–99)
GLUCOSE BLDC GLUCOMTR-MCNC: 227 MG/DL (ref 70–99)
GLUCOSE BLDC GLUCOMTR-MCNC: 56 MG/DL (ref 70–99)
GLUCOSE BLDC GLUCOMTR-MCNC: 59 MG/DL (ref 70–99)
GLUCOSE BLDC GLUCOMTR-MCNC: 98 MG/DL (ref 70–99)
GLUCOSE SERPL-MCNC: 155 MG/DL (ref 70–99)
HCT VFR BLD AUTO: 28.4 % (ref 35–47)
HGB BLD-MCNC: 9.4 G/DL (ref 11.7–15.7)
MCH RBC QN AUTO: 33.3 PG (ref 26.5–33)
MCHC RBC AUTO-ENTMCNC: 33.1 G/DL (ref 31.5–36.5)
MCV RBC AUTO: 101 FL (ref 78–100)
PLATELET # BLD AUTO: 263 10E3/UL (ref 150–450)
POTASSIUM SERPL-SCNC: 4.7 MMOL/L (ref 3.4–5.3)
RBC # BLD AUTO: 2.82 10E6/UL (ref 3.8–5.2)
SODIUM SERPL-SCNC: 141 MMOL/L (ref 136–145)
WBC # BLD AUTO: 10.2 10E3/UL (ref 4–11)

## 2023-03-26 PROCEDURE — 250N000013 HC RX MED GY IP 250 OP 250 PS 637: Performed by: NURSE PRACTITIONER

## 2023-03-26 PROCEDURE — 85027 COMPLETE CBC AUTOMATED: CPT

## 2023-03-26 PROCEDURE — 80048 BASIC METABOLIC PNL TOTAL CA: CPT

## 2023-03-26 PROCEDURE — 36415 COLL VENOUS BLD VENIPUNCTURE: CPT

## 2023-03-26 PROCEDURE — 250N000011 HC RX IP 250 OP 636: Performed by: PHYSICIAN ASSISTANT

## 2023-03-26 PROCEDURE — 250N000013 HC RX MED GY IP 250 OP 250 PS 637: Performed by: PHYSICIAN ASSISTANT

## 2023-03-26 PROCEDURE — 250N000013 HC RX MED GY IP 250 OP 250 PS 637: Performed by: SURGERY

## 2023-03-26 PROCEDURE — 250N000009 HC RX 250: Performed by: INTERNAL MEDICINE

## 2023-03-26 PROCEDURE — 999N000157 HC STATISTIC RCP TIME EA 10 MIN

## 2023-03-26 PROCEDURE — 94640 AIRWAY INHALATION TREATMENT: CPT

## 2023-03-26 PROCEDURE — 250N000013 HC RX MED GY IP 250 OP 250 PS 637: Performed by: INTERNAL MEDICINE

## 2023-03-26 PROCEDURE — 97535 SELF CARE MNGMENT TRAINING: CPT | Mod: GO

## 2023-03-26 PROCEDURE — 99232 SBSQ HOSP IP/OBS MODERATE 35: CPT | Performed by: INTERNAL MEDICINE

## 2023-03-26 PROCEDURE — 120N000002 HC R&B MED SURG/OB UMMC

## 2023-03-26 RX ADMIN — ACETAMINOPHEN 975 MG: 325 TABLET, FILM COATED ORAL at 19:18

## 2023-03-26 RX ADMIN — ATORVASTATIN CALCIUM 20 MG: 20 TABLET, FILM COATED ORAL at 09:43

## 2023-03-26 RX ADMIN — DEXTRAN 70, GLYCERIN, HYPROMELLOSE 2 DROP: 1; 2; 3 SOLUTION/ DROPS OPHTHALMIC at 09:44

## 2023-03-26 RX ADMIN — MAGNESIUM OXIDE TAB 400 MG (241.3 MG ELEMENTAL MG) 400 MG: 400 (241.3 MG) TAB at 19:19

## 2023-03-26 RX ADMIN — METFORMIN HYDROCHLORIDE 500 MG: 500 TABLET ORAL at 18:15

## 2023-03-26 RX ADMIN — FEXOFENADINE HYDROCHLORIDE 90 MG: 30 SUSPENSION ORAL at 19:25

## 2023-03-26 RX ADMIN — Medication 4 ML: at 11:34

## 2023-03-26 RX ADMIN — SENNOSIDES AND DOCUSATE SODIUM 1 TABLET: 50; 8.6 TABLET ORAL at 19:19

## 2023-03-26 RX ADMIN — ACETAMINOPHEN 975 MG: 325 TABLET, FILM COATED ORAL at 09:42

## 2023-03-26 RX ADMIN — SENNOSIDES AND DOCUSATE SODIUM 1 TABLET: 50; 8.6 TABLET ORAL at 09:42

## 2023-03-26 RX ADMIN — AMLODIPINE BESYLATE 2.5 MG: 2.5 TABLET ORAL at 09:43

## 2023-03-26 RX ADMIN — BISACODYL 10 MG: 10 SUPPOSITORY RECTAL at 20:07

## 2023-03-26 RX ADMIN — MIRTAZAPINE 7.5 MG: 7.5 TABLET, FILM COATED ORAL at 22:19

## 2023-03-26 RX ADMIN — Medication 2.5 MG: at 19:19

## 2023-03-26 RX ADMIN — MAGNESIUM OXIDE TAB 400 MG (241.3 MG ELEMENTAL MG) 400 MG: 400 (241.3 MG) TAB at 09:42

## 2023-03-26 RX ADMIN — METFORMIN HYDROCHLORIDE 500 MG: 500 TABLET ORAL at 09:43

## 2023-03-26 RX ADMIN — FEXOFENADINE HYDROCHLORIDE 90 MG: 30 SUSPENSION ORAL at 09:44

## 2023-03-26 RX ADMIN — ENOXAPARIN SODIUM 30 MG: 30 INJECTION SUBCUTANEOUS at 16:44

## 2023-03-26 RX ADMIN — TRIAMCINOLONE ACETONIDE: 1 CREAM TOPICAL at 09:45

## 2023-03-26 RX ADMIN — POLYETHYLENE GLYCOL 3350 17 G: 17 POWDER, FOR SOLUTION ORAL at 09:43

## 2023-03-26 RX ADMIN — TRIAMCINOLONE ACETONIDE: 1 CREAM TOPICAL at 19:40

## 2023-03-26 ASSESSMENT — ACTIVITIES OF DAILY LIVING (ADL)
ADLS_ACUITY_SCORE: 76

## 2023-03-26 NOTE — PROGRESS NOTES
Brief Pulmonary Note    Chart reviewed and spoke with primary team. Pt has been unable to produce any sputum (even with induction). Wondering about bronchoscopy and potential timing of procedure. Will discuss her case with oncoming consult staff Dr. Barillas on Monday 3/27 and reach out to the team with our recommendations. Would keep pt in resp iso for the time being.     Bessy Quintero MD on 3/26/2023 at 1:13 PM

## 2023-03-26 NOTE — PROGRESS NOTES
Administered 7% NaCl neb as order for sputum induction.  Pt unable to produce sputum despite good cough (dry cough). Will try tomorrow am. RT to follow and reassess prn per [facility]AT.

## 2023-03-26 NOTE — PROGRESS NOTES
"Care Management Follow Up    Length of Stay (days): 6    Expected Discharge Date: 03/27/2023, pending      Concerns to be Addressed: Discharge planning    Patient plan of care discussed at interdisciplinary rounds: Yes    Anticipated Discharge Disposition: Home  Anticipated Discharge Services: resumption of home care and PCA services  Anticipated Discharge DME: TBD    Education Provided on the Discharge Plan: yes   Patient/Family in Agreement with the Plan: yes     Private pay costs discussed: Not applicable    Additional Information:  Spoke to patient's daughter in law- Angel. Informed her that previous RNCC notified [employer] that patient will need PT/OT/HHA added to her home care services. Angel stated that HHA can be removed since patient has PCA services. Home Care order was adjusted. RNCC to update [employer] day of discharge. Angel would like assist with transportation at discharge. Patient is wheelchair bound and they do not have a wheelchair accessible vehicle. Discussed [facility] transportation. RNCC to assess need for stretcher vs wheelchair closer to discharge. Of note, patient is currently on Airborne precautions. Patient lives in an apartment with an elevator (no stairs to enter building). Left VM for patient's [facility]. Angel stated that [facility] has been in contact with them and is aware that patient is in the hospital.     Per RNCC note on 3/24/23, \"Called [employer] [facility]. (ph:[phone] fx:[phone]).  Spoke w/ intake, informed them pt is estimated to be discharge-ready by Sunday or Monday. Inquired if pt can receive PT, OT, and HHA in addition to the skilled nursing for med mgmt that she already receives.  Representative stated that it should not be a problem and that this writer can send orders for those additional services at discharge\".     [facility] IRENE Kimball, [phone] (out of office until Thursday    [facility]. (ph:[phone] " fx:[phone])       SAIDA Eastman RNCC  RN Care Coordinator   Office: [phone]   Pager: [phone]

## 2023-03-26 NOTE — PLAN OF CARE
VS: VSS, pt denied CP or SOB.   O2: Room air sat. > 90%.   Output: Voiding adequate amount incontinent of bowel and bladder.    Last BM: 03/25/23 passing gas.    Activity: Repositioned in bed, pt was up with PT.    Skin: Intact except surgical incision and some redness on coccyx.    Pain: Denied pain this morning.    Neuro: CMS and neuro intact to baseline.    Dressing: Slight dry drainage noted.    Diet: Regular tolerating okay.    LDA: MICHAEL VASQUEZ.    Equipment: Walker, and personal belongings.    Plan: TBD.    Additional Info:

## 2023-03-26 NOTE — PLAN OF CARE
Occupational Therapy Discharge Summary    Reason for therapy discharge:    All goals and outcomes met, no further needs identified.    Progress towards therapy goal(s). See goals on Care Plan in [facility] electronic health record for goal details.  Goals met    Therapy recommendation(s):    Home with assist from family for transfers/ADLs, and home OT safety eval and for progression of caregiver training as needed, home health aide to assist with bathing task

## 2023-03-26 NOTE — PLAN OF CARE
Pt is A&Ox4. Czech speaking. VSS. LS clear, on RA. BS active, LBM on 3/25/2023. Pt is incontinent of bladder and bowel. Pt denies pain. Meds crushed and taken with applesauce. L PIV is patent and SL. Pt up with 2 assist. Continue to monitor.

## 2023-03-26 NOTE — PROGRESS NOTES
"[facility], [place]   Internal Medicine Daily Note           Interval History/Events     No acute concerns per patient or family.  Pain controlled.  Family feels patient is close to being able to be managed at home.  No reported cough, chest pain, shortness of breath.  RT has been unable to obtain sputum even with hypertonic neb.         Review of Systems        4 point ROS including Respiratory, CV, GI and , other than that noted above is negative      Medications   I have reviewed current medications  in the \"current medication\" section of Epic.  Relevant changes include:     Physical Exam   General:       Vital signs:    Blood pressure 133/63, pulse 76, temperature (!) 96.7  F (35.9  C), temperature source Oral, resp. rate 16, height 1.575 m (5' 2.01\"), weight 40.8 kg (89 lb 15.2 oz), SpO2 94 %.  Estimated body mass index is 16.45 kg/m  as calculated from the following:    Height as of this encounter: 1.575 m (5' 2.01\").    Weight as of this encounter: 40.8 kg (89 lb 15.2 oz).          Constitutional: Laying in bed in no acute distress  Mouth/ENT: Normal oral mucosa  Cardiovascular: S1-S2 normal  Respiratory: Clear bilaterally  GI: Soft non tender, bowel sound positive  :   Neurology: Alert awake oriented, left hemiplegia  No edema  Left hip incision was not examined by me.  No lower extremity edema     Laboratory and Imaging Studies     I have reviewed  laboratory and imaging studies in the Epic. Pertinent findings are as below:    BMP  Recent Labs   Lab 03/26/23  0909 03/26/23  0852 03/26/23  0836 03/26/23  0816 03/25/23  1333 03/25/23  0551 03/24/23  1722 03/24/23  1249 03/23/23  0736 03/23/23  0655 03/22/23  0759 03/22/23  0410   NA  --   --   --   --   --  139  --  138  --  140  --  132*   POTASSIUM  --   --   --   --   --  4.2  --  4.0  --  3.8  --  3.8   CHLORIDE  --   --   --   --   --  106  --  105  --  104  --  99   MARY  --   --   --   --   --  8.7*  --  8.7*  --  8.7*  " --  8.1*   CO2  --   --   --   --   --  25  --  23  --  25  --  18*   BUN  --   --   --   --   --  12.7  --  16.0  --  15.1  --  12.8   CR  --   --   --   --   --  0.80  --  0.83  --  0.83  --  0.66   * 98 59* 56*   < > 70  70   < > 242*   < > 185*   < > 261*    < > = values in this interval not displayed.     CBC  Recent Labs   Lab 03/25/23  0551 03/24/23  1249 03/23/23  0655 03/22/23  0416   WBC 9.5 9.5 10.1 12.8*   RBC 2.63* 2.59* 2.89* 3.04*   HGB 8.7* 8.8* 9.5* 10.1*   HCT 26.5* 26.3* 28.8* 29.0*   * 102* 100 95   MCH 33.1* 34.0* 32.9 33.2*   MCHC 32.8 33.5 33.0 34.8   RDW 14.6 14.8 14.5 14.0    172 165 181     INR  Recent Labs   Lab 03/20/23  1652   INR 1.06     LFTs  Recent Labs   Lab 03/20/23  1652   ALKPHOS 43   AST 19   ALT 7*   BILITOTAL 0.4   PROTTOTAL 6.0*   ALBUMIN 3.5      PANC  Recent Labs   Lab 03/20/23  1652   LIPASE 12*           Impression/Plan        88 year old Romanian speaking female with a medical history significant for 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 Suspicion for urosepsis given hypotension and tachycardia and UA positive for nitrites, leukocyte esterase and large amounts of WBCs. Patient transfer to [facility] for Left Hip Arthroplasty. Surgical procedure was uncomplicated and patient is in stable condition.         # Hx of CVA c/b Hemiparesis and Hemiplegia  -Stable neuro status.  Functional status improving to the point that family feels they can manage her at home  -     # HTN   # HLD  Stable on resumed amlodipine.  Lipitor         # Hyponatremia, resolved  # Hypomagnesia, mild  Plan: Oral magnesium replacement    #  Hx of urinary retention  History of recurrent UTI  Positive urine culture on admission likely representing contaminant  Status post 5-day course of Rocephin  No further treatment  indicated.  Family to consider outpatient urology evaluation  Bowel regimen as patient has been constipated since surgery       # Stress hyperglycemia superimposed on   # Diabetes Mellitus type II   Blood glucoses have been trending down particular in the morning.  Currently receiving Lantus 30 units twice daily plus metformin, not yet back on glipizide.  Fair intake per nursing staff  Plan: Reduce Lantus to 20 units twice daily, continue metformin, continue to hold glipizide.  We will need to reassess at time of discharge as patient is currently on considerably less medication than baseline    # Positive blood culture from 3/20/2023: Blood culture from Paenibacillus species.    F/u BC neg, likely contaminant      # Acute blood loss anemia due to fracture and  surgical blood loss   Hgb 8.7, stable  Plan monitor       # Left femoral neck fracture due to mechanical fall   # S/p LEFT hip hemiarthroplasty on 3/21   Regional anesthesia placed left iliac fascia block at [place].  Head CT negative for trauma x 2. trauma eval completed prior to transfer to [facility]   -  Fall precautions  - PT/OT.  Appreciate PT OT recommendations  - Posterior hip precautions. WBAT. Up with assist       # Right upper lobe spiculated mass:    Seen  on the CT scan on 3/20/2023.  Pulmonology consulted and case discussed.  Pulmonary has advised to rule out tuberculosis.  -Airborne isolation placed  -RT has not been able to induce sputum as noted above.      Discussed with pulmonary fellow on 3/26/2023, will determine tomorrow whether patient may have bronchoscopy while inpatient or if it should be scheduled as an outpatient in which case patient can probably discharge to home tomorrow     DVT Prophylaxis: Orthopedics recommends 4 weeks of enoxaparin    Pt's care was discussed with bedside RN, patient and  during Care Team Rounds.         Spencer Colin MD

## 2023-03-26 NOTE — PLAN OF CARE
Writer had pt from [id number]. There are orders in for sputum sample. Pt unable to provide. RT came to try to collect a sample between 1430 and 1500 and were unable to collect one. RT stated they would try to collect a sputum sample from 546 between 0600 and 0700 on 03/26/23.

## 2023-03-27 LAB
ANION GAP SERPL CALCULATED.3IONS-SCNC: 8 MMOL/L (ref 7–15)
BUN SERPL-MCNC: 19.3 MG/DL (ref 8–23)
CALCIUM SERPL-MCNC: 9.5 MG/DL (ref 8.8–10.2)
CHLORIDE SERPL-SCNC: 103 MMOL/L (ref 98–107)
CREAT SERPL-MCNC: 0.85 MG/DL (ref 0.51–0.95)
DEPRECATED HCO3 PLAS-SCNC: 28 MMOL/L (ref 22–29)
ERYTHROCYTE [DISTWIDTH] IN BLOOD BY AUTOMATED COUNT: 14.5 % (ref 10–15)
GFR SERPL CREATININE-BSD FRML MDRD: 66 ML/MIN/1.73M2
GLUCOSE BLDC GLUCOMTR-MCNC: 103 MG/DL (ref 70–99)
GLUCOSE BLDC GLUCOMTR-MCNC: 113 MG/DL (ref 70–99)
GLUCOSE BLDC GLUCOMTR-MCNC: 148 MG/DL (ref 70–99)
GLUCOSE BLDC GLUCOMTR-MCNC: 180 MG/DL (ref 70–99)
GLUCOSE BLDC GLUCOMTR-MCNC: 247 MG/DL (ref 70–99)
GLUCOSE BLDC GLUCOMTR-MCNC: 44 MG/DL (ref 70–99)
GLUCOSE BLDC GLUCOMTR-MCNC: 58 MG/DL (ref 70–99)
GLUCOSE SERPL-MCNC: 102 MG/DL (ref 70–99)
HCT VFR BLD AUTO: 27.5 % (ref 35–47)
HGB BLD-MCNC: 9.2 G/DL (ref 11.7–15.7)
MCH RBC QN AUTO: 33.5 PG (ref 26.5–33)
MCHC RBC AUTO-ENTMCNC: 33.5 G/DL (ref 31.5–36.5)
MCV RBC AUTO: 100 FL (ref 78–100)
PLATELET # BLD AUTO: 248 10E3/UL (ref 150–450)
POTASSIUM SERPL-SCNC: 4.8 MMOL/L (ref 3.4–5.3)
RBC # BLD AUTO: 2.75 10E6/UL (ref 3.8–5.2)
SODIUM SERPL-SCNC: 139 MMOL/L (ref 136–145)
WBC # BLD AUTO: 8.9 10E3/UL (ref 4–11)

## 2023-03-27 PROCEDURE — 250N000013 HC RX MED GY IP 250 OP 250 PS 637: Performed by: INTERNAL MEDICINE

## 2023-03-27 PROCEDURE — 120N000002 HC R&B MED SURG/OB UMMC

## 2023-03-27 PROCEDURE — 99232 SBSQ HOSP IP/OBS MODERATE 35: CPT | Performed by: INTERNAL MEDICINE

## 2023-03-27 PROCEDURE — 250N000013 HC RX MED GY IP 250 OP 250 PS 637: Performed by: SURGERY

## 2023-03-27 PROCEDURE — 250N000013 HC RX MED GY IP 250 OP 250 PS 637: Performed by: PHYSICIAN ASSISTANT

## 2023-03-27 PROCEDURE — 36415 COLL VENOUS BLD VENIPUNCTURE: CPT

## 2023-03-27 PROCEDURE — 250N000011 HC RX IP 250 OP 636: Performed by: PHYSICIAN ASSISTANT

## 2023-03-27 PROCEDURE — 85027 COMPLETE CBC AUTOMATED: CPT

## 2023-03-27 PROCEDURE — 99232 SBSQ HOSP IP/OBS MODERATE 35: CPT | Mod: 24 | Performed by: STUDENT IN AN ORGANIZED HEALTH CARE EDUCATION/TRAINING PROGRAM

## 2023-03-27 PROCEDURE — 250N000013 HC RX MED GY IP 250 OP 250 PS 637: Performed by: NURSE PRACTITIONER

## 2023-03-27 PROCEDURE — 99232 SBSQ HOSP IP/OBS MODERATE 35: CPT | Mod: 24 | Performed by: REGISTERED NURSE

## 2023-03-27 PROCEDURE — 99207 PR NON-BILLABLE SERV PER CHARTING: CPT | Performed by: REGISTERED NURSE

## 2023-03-27 PROCEDURE — 80048 BASIC METABOLIC PNL TOTAL CA: CPT

## 2023-03-27 RX ADMIN — MIRTAZAPINE 7.5 MG: 7.5 TABLET, FILM COATED ORAL at 19:59

## 2023-03-27 RX ADMIN — FEXOFENADINE HYDROCHLORIDE 90 MG: 30 SUSPENSION ORAL at 09:55

## 2023-03-27 RX ADMIN — AMLODIPINE BESYLATE 2.5 MG: 2.5 TABLET ORAL at 08:59

## 2023-03-27 RX ADMIN — TRIAMCINOLONE ACETONIDE: 1 CREAM TOPICAL at 19:59

## 2023-03-27 RX ADMIN — SENNOSIDES AND DOCUSATE SODIUM 1 TABLET: 50; 8.6 TABLET ORAL at 08:56

## 2023-03-27 RX ADMIN — SENNOSIDES AND DOCUSATE SODIUM 2 TABLET: 50; 8.6 TABLET ORAL at 19:58

## 2023-03-27 RX ADMIN — TRIAMCINOLONE ACETONIDE: 1 CREAM TOPICAL at 09:00

## 2023-03-27 RX ADMIN — FEXOFENADINE HYDROCHLORIDE 90 MG: 30 SUSPENSION ORAL at 19:59

## 2023-03-27 RX ADMIN — ACETAMINOPHEN 975 MG: 325 TABLET, FILM COATED ORAL at 08:55

## 2023-03-27 RX ADMIN — ENOXAPARIN SODIUM 30 MG: 30 INJECTION SUBCUTANEOUS at 17:50

## 2023-03-27 RX ADMIN — ACETAMINOPHEN 975 MG: 325 TABLET, FILM COATED ORAL at 17:50

## 2023-03-27 RX ADMIN — MAGNESIUM OXIDE TAB 400 MG (241.3 MG ELEMENTAL MG) 400 MG: 400 (241.3 MG) TAB at 20:00

## 2023-03-27 RX ADMIN — METFORMIN HYDROCHLORIDE 500 MG: 500 TABLET ORAL at 17:51

## 2023-03-27 RX ADMIN — MAGNESIUM OXIDE TAB 400 MG (241.3 MG ELEMENTAL MG) 400 MG: 400 (241.3 MG) TAB at 08:56

## 2023-03-27 RX ADMIN — METFORMIN HYDROCHLORIDE 500 MG: 500 TABLET ORAL at 08:57

## 2023-03-27 RX ADMIN — ATORVASTATIN CALCIUM 20 MG: 20 TABLET, FILM COATED ORAL at 08:55

## 2023-03-27 RX ADMIN — DEXTROSE 15 G: 15 GEL ORAL at 03:13

## 2023-03-27 RX ADMIN — ACETAMINOPHEN 975 MG: 325 TABLET, FILM COATED ORAL at 01:22

## 2023-03-27 ASSESSMENT — ACTIVITIES OF DAILY LIVING (ADL)
ADLS_ACUITY_SCORE: 76

## 2023-03-27 NOTE — PLAN OF CARE
VS: VSS, pt denied CP or SOB.   O2: Room air sat. > 90%.   Output: Voiding adequate amount incontinent of bowel and bladder.    Last BM: 03/26/23 passing gas.    Activity: Repositioned in bed, pt was up with PT.    Skin: Intact except surgical incision and some redness on coccyx GEOFF protective dressing on..    Pain: Denied pain this morning.    Neuro: CMS and neuro intact to baseline.    Dressing: Slight dry drainage noted.    Diet: NPO after midnight for procedure tomorrow morning.    LDA: MICHAEL VASQUEZ.    Equipment: Walker, and personal belongings.    Plan: TBD.    Additional Info:  bed alarm on for safety, call light in reach.                      Internal Medicine Progress Note Patient's Name: Alem Briggs Admit Date: 1/24/2019 Length of Stay: 3 Assessment/Plan Active Hospital Problems Diagnosis Date Noted  Anemia 01/25/2019  Wound infection 01/25/2019  Lower extremity ulceration, left, with fat layer exposed ([facility] 01/24/2019  DM (diabetes mellitus) ([facility] 12/22/2018  
 HTN (hypertension) 12/22/2018  PAD (peripheral artery disease) ([facility] 09/17/2012  
 
- Diet and mobilization per primary team 
- Pain control PRN 
- PT/OT 
- Afebrile w/o leukocytosis - Cont doxy (3/7) - BP in good range 
- Hgb trending down, no si/sx active bleeding - Trend CBC 
- Diabetic diet 
- Cont acceptable home medications for chronic conditions  
- DVT protocol I have personally reviewed all pertinent labs and films that have officially resulted over the last 24 hours. I have personally checked for all pending labs that are awaiting final results. Subjective Pt s/e @ bedside No major events overnight Doing well Pain tolerable Denies CP or SOB Objective Visit Vitals /65 (BP 1 Location: Right arm, BP Patient Position: At rest) Pulse 78 Temp 97.9 °F (36.6 °C) Resp 16 Ht 5' 10\" (1.778 m) Wt 57.2 kg (126 lb) SpO2 99% BMI 18.08 kg/m² Physical Exam: 
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 
BMP:  
Lab Results Component Value Date/Time  01/27/2019 04:50 AM  
 K 3.9 01/27/2019 04:50 AM  
  01/27/2019 04:50 AM  
 CO2 27 01/27/2019 04:50 AM  
 AGAP 6 01/27/2019 04:50 AM  
  (H) 01/27/2019 04:50 AM  
 BUN 23 (H) 01/27/2019 04:50 AM  
 CREA 1.03 01/27/2019 04:50 AM  
 GFRAA >60 01/27/2019 04:50 AM  
 GFRNA >60 01/27/2019 04:50 AM  
 
CBC:  
Lab Results Component Value Date/Time WBC 6.5 01/27/2019 04:50 AM  
 HGB 7.8 (L) 01/27/2019 04:50 AM  
 HCT 24.7 (L) 01/27/2019 04:50 AM  
  01/27/2019 04:50 AM  
 
 
Imaging Reviewed: 
No results found. Medications Reviewed: 
Current Facility-Administered Medications Medication Dose Route Frequency  doxycycline (MONODOX) capsule 100 mg  100 mg Oral Q12H  clopidogrel (PLAVIX) tablet 75 mg  75 mg Oral DAILY  carvedilol (COREG) tablet 25 mg  25 mg Oral BID WITH MEALS  furosemide (LASIX) tablet 20 mg  20 mg Oral DAILY  gabapentin (NEURONTIN) capsule 400 mg  400 mg Oral TID  memantine (NAMENDA) tablet 10 mg  10 mg Oral DAILY  tamsulosin (FLOMAX) capsule 0.4 mg  0.4 mg Oral DAILY  docusate sodium (COLACE) capsule 100 mg  100 mg Oral DAILY  allopurinol (ZYLOPRIM) tablet 100 mg  100 mg Oral DAILY  atorvastatin (LIPITOR) tablet 40 mg  40 mg Oral QHS  oxyCODONE-acetaminophen (PERCOCET) 5-325 mg per tablet 1 Tab  1 Tab Oral Q4H PRN  
 levETIRAcetam (KEPPRA) tablet 750 mg  750 mg Oral BID  tiotropium (SPIRIVA) inhalation capsule 18 mcg  1 Cap Inhalation DAILY  albuterol (PROVENTIL VENTOLIN) nebulizer solution 2.5 mg  2.5 mg Nebulization Q6H PRN  
 famotidine (PEPCID) tablet 20 mg  20 mg Oral BID  colchicine tablet 0.6 mg  0.6 mg Oral DAILY  olmesartan (BENICAR) tablet 40 mg  40 mg Oral DAILY  sodium chloride (NS) flush 5-40 mL  5-40 mL IntraVENous Q8H  
 sodium chloride (NS) flush 5-40 mL  5-40 mL IntraVENous PRN  
 heparin (porcine) injection 5,000 Units  5,000 Units SubCUTAneous Q8H  
 amLODIPine (NORVASC) tablet 5 mg  5 mg Oral DAILY  acetaminophen (TYLENOL) tablet 650 mg  650 mg Oral Q6H PRN Lela Samson DO Internal Medicine, Hospitalist 
Pager: [phone] [facility]

## 2023-03-27 NOTE — PROGRESS NOTES
MEDICINE CROSS COVER:  Notified by RN that patient was hypoglycemic to 44 around 0200, improved to 113 with dextrose administration.    Will hold lantus this AM until daytime provider can review and make adjustments, if appropriate.     Radha Hilton MD  Internal Medicine/Pediatrics Hospitalist   of Internal Medicine and Pediatrics  [facility]  Pager: [phone]

## 2023-03-27 NOTE — PROGRESS NOTES
[facility] GENERAL INFECTIOUS DISEASES Progress Note     Patient:  Julio rGier   Date of birth 9/13/1934, Medical record number [id number]  Date of Visit:  03/27/2023  Date of Admission: 3/20/2023  Consult Requester:Brenna Page*          Assessment and Recommendations:   ASSESSMENT:  1. Blood culture positive 1/2 bottles in 1/2 sets (Paenibacillus species)     Likely environmental contaminant    Repeat blood culture (3/24);  72 hours with no growth to date, follow until finalized    2. Urinalysis concerning for UTI with UC pending  a. UC polymicrobial  b. History of positive UC (6/3/22) Kelbsiella oxytoca (R: ampicillin) - treated with nitrofurantoin  c. History of multiple UTIs s/p treatment with antibiotics over 2022-23    3. Right upper lobe spiculated mass    History of Tuberculosis (treated 2010); per chart review: started on a 4-drug regimen given high concern for tuberculosis as she has had close contact exposures in the past. She had a grossly positive PPD to 20 cm. She was also connected with [facility].     Pulmonary consulted this hospitalization and following    4. Left femur fracture s/p mechanical fall s/p L hip hemiarthroplasty on 3/21 with Dr. Paulino    5. Leukocytosis; resolved    6. Lactic acidosis; resolved    DISCUSSION:     Patient is well-appearing today but tire per patient and her family and has been off antibiotics since 3/25. Continues to improve with her transfers from bed to chair. She remains afebrile and hemodynamically stable Blood culture growing Paenibacillus species, an environmental bacteria, in one of four bottles; likely representative of contamination. Repeat blood cultures are 72 hours negative, which provides further support that the prior positive blood culture was likely a contaminant. However, recommend following blood cultures until they finalize. Urinary frequency is noted by family to be every 30 minutes to an hour with occasional  blood-tinged/orange urine. Family states that patient had complained of dysuria prior to hospitalization and this has resolved. Initial leukocytosis and lactic acidosis resolved. She has had one-time dose of IV vancomycin, IV cefepime on the first day, and 4 days of IV ceftriaxone, along with a dose of IV cefazolin. Three days of IV antibiotics should be adequate for adequate for cystitis. No signs of pyelonephritis on imaging. Concern for bladder malignancy with recurrent blood in urinalysis and visualized by family. Recommend follow up with outpatient urology to help with further workup of recurrent UTIs and malignancy rule out. Agree with pulmonary consultation given findings on CT C/A/P with history of TB versus concern for malignancy. ID will sign off today. Please don't hesitate to contact [email or web address] ID provider on call with any further questions.      RECOMMENDATION:  1. Continue to follow repeat blood cultures  2. Monitor for worsening symptoms and labs off antibiotics  3. Recommend referral to urology outpatient to help with further workup with malignancy versus frequent recurrent UTIs given hematuria; will likely need cystoscopy for biopsy      Thank you for allowing us to participate in the care of this patient. ID will sign off at this time, please don't hesitate to contact the [email or web address] ID team should further questions arise.      Patient was discussed with Dr. Hobbs.     SHELL Galdamez, CNP  Infectious Diseases  Pager# [phone]  ________________________________________________________________    Consult Question: Positive blood culture.  Admission Diagnosis: Elevated troponin [R77.8]  Elevated lactic acid level [R79.89]  Fall, initial encounter [W19.XXXA]           Interval History:     Patient is tired today but per family she is improving with her transfers from bed to chair. She is waiting to hear if pulmonary consult will occur in the hospital to assess the upper lobe nodule seen on recent  imaging; may follow up outpatient. Able to have a bowel movement today after many days of constipation.     Denies fever, chills, night sweats, cough, dyspnea, nausea, abdominal pain, diarrhea, dysuria, myalgias, arthralgias, lymphadenopathy, acute rash.         Review of Systems:   9-point ROS obtained, pertinent positives and negatives per above         Past Medical History:     Past Medical History:   Diagnosis Date     Diabetes mellitus (H)      Unspecified cerebral artery occlusion with cerebral infarction             Past Surgical History:     Past Surgical History:   Procedure Laterality Date     BACK SURGERY       HEMIARTHROPLASTY HIP Left 3/21/2023    Procedure: Left Hip Hemiarthroplasty;  Surgeon: Jeffrey Paulino MD;  Location: [facility] OR     ORTHOPEDIC SURGERY              Family History:   Reviewed and non-contributory.   No family history on file.         Social History:     Social History     Tobacco Use     Smoking status: Never     Smokeless tobacco: Never   Substance Use Topics     Alcohol use: Not Currently     History   Sexual Activity     Sexual activity: Not Currently            Current Medications:       acetaminophen  975 mg Oral Q8H     amLODIPine  2.5 mg Oral Daily     atorvastatin  20 mg Oral Daily     enoxaparin ANTICOAGULANT  30 mg Subcutaneous Q24H     fexofenadine  90 mg Oral BID     insulin aspart  1-7 Units Subcutaneous TID AC     insulin aspart  1-5 Units Subcutaneous At Bedtime     [Held by provider] insulin glargine  20 Units Subcutaneous BID     magnesium oxide  400 mg Oral BID     metFORMIN  500 mg Oral BID w/meals     mirtazapine  7.5 mg Oral At Bedtime     polyethylene glycol  17 g Oral Daily     senna-docusate  1 tablet Oral BID     triamcinolone   Topical BID            Allergies:     Allergies   Allergen Reactions     Aspirin Unknown     Irbesartan Unknown     Penicillins Other (See Comments)     Pt has some sort of reaction at dentist office after PEN supposedly. Pt  "cannot verify nor can family. Pt has no reaction to \"stephane\" products as was previously indicated. This was verified by familly. All this reviewed with opthalmalogist and anestheiologist on 5/17/2018            Physical Exam:   Vitals were reviewed  Patient Vitals for the past 24 hrs:   BP Temp Temp src Pulse Resp SpO2   03/27/23 0729 131/66 (!) 96.4  F (35.8  C) Oral 82 16 99 %   03/26/23 2322 122/57 (!) 96.7  F (35.9  C) Oral 83 16 97 %   03/26/23 1500 130/60 (!) 96.7  F (35.9  C) Oral 93 16 --       Physical Examination:  GENERAL:  well-developed, well-nourished, in bed in no acute distress.   HEENT:  Head is normocephalic, atraumatic   EYES:  Eyes have anicteric sclerae without conjunctival injection or stigmata of endocarditis.    ENT:  Oropharynx is moist without exudates or ulcers. Tongue is midline  NECK:  Supple. No cervical lymphadenopathy  LUNGS:  Clear to auscultation bilateral.   CARDIOVASCULAR:  Regular rate and rhythm with no murmurs, gallops or rubs.  ABDOMEN:  Normal bowel sounds, soft, nontender. No appreciable hepatosplenomegaly  SKIN:  No acute rashes.  Line(s) are in place without any surrounding erythema or exudate. No stigmata of endocarditis.  NEUROLOGIC:  Grossly nonfocal. Active x4 extremities         Laboratory Data:     Inflammatory Markers  No lab results found.    Hematology Studies    Recent Labs   Lab Test 03/27/23  0738 03/26/23  1001 03/25/23  0551 03/24/23  1249 03/23/23  0655 03/22/23  0416   WBC 8.9 10.2 9.5 9.5 10.1 12.8*   HGB 9.2* 9.4* 8.7* 8.8* 9.5* 10.1*    101* 101* 102* 100 95    263 211 172 165 181       Metabolic Studies     Recent Labs   Lab Test 03/27/23  0738 03/26/23  1001 03/25/23  0551 03/24/23  1249 03/23/23  0655    141 139 138 140   POTASSIUM 4.8 4.7 4.2 4.0 3.8   CHLORIDE 103 104 106 105 104   CO2 28 26 25 23 25   BUN 19.3 15.9 12.7 16.0 15.1   CR 0.85 0.77 0.80 0.83 0.83   GFRESTIMATED 66 74 70 67 67       Hepatic Studies    Recent Labs "   Lab Test 03/20/23  1652   BILITOTAL 0.4   ALKPHOS 43   ALBUMIN 3.5   AST 19   ALT 7*       Microbiology:  Culture   Date Value Ref Range Status   03/24/2023 No growth after 2 days  Preliminary   03/24/2023 No growth after 2 days  Preliminary   03/20/2023 10,000-50,000 CFU/mL Gram negative bacilli (A)  Final   03/20/2023 10,000-50,000 CFU/mL Gram positive bacilli (A)  Final   03/20/2023 <10,000 CFU/mL Gram negative bacilli (A)  Final   03/20/2023 (A)  Final    <10,000 CFU/mL Gram positive bacilli, resembling diphtheroids   03/20/2023 Positive on the 2nd day of incubation (A)  Final   03/20/2023 Paenibacillus species (AA)  Final     Comment:     1 of 2 bottlesIdentification obtained by MALDI-TOF mass spectrometry research use only database. Test characteristics determined and verified by the [facility].   03/20/2023 No Growth  Final   06/03/2022 >100,000 CFU/mL Klebsiella oxytoca (A)  Final       Urine Studies    Recent Labs   Lab Test 03/20/23  2245 06/03/22  1556   LEUKEST Large* Large*   WBCU >182* >182*       Vancomycin Levels  No lab results found.    Invalid input(s): VANCO    Hepatitis B Testing No lab results found.  Hepatitis C Testing   No results found for: HCVAB, HQTG, HCGENO, HCPCR, HQTRNA, HEPRNA  Respiratory Virus Testing    RSV Rapid Antigen Result   Date Value Ref Range Status   03/19/2010 Negative NEG Final             Imaging:   EXAM: XR PELVIS AND HIP LEFT 1 VIEW  LOCATION: [facility]  DATE/TIME: 3/20/2023 5:39 PM    IMPRESSION: Acute fracture of the left femoral neck with mild displacement and varus angulation. The margins of the fracture are ill-defined and an underlying lytic lesion is possible but not considered likely. CT could assess further.     There is diffuse bony demineralisation. Mild degenerative arthritis left hip joint. Sclerotic lesions involving the pubic bones are stable. Postoperative and degenerative  changes in lumbar spine.      EXAMINATION: CT CHEST/ABDOMEN/PELVIS W CONTRAST, 3/20/2023 9:14 PM    IMPRESSION:   1. Right upper lobe spiculated mass. On review of prior from 2010  there was a groundglass nodule in the same region which could indicate  a slow growing adenocarcinoma. Differential include infection and  scarring from prior infection.  2. Unchanged calcified nodules at the left apex, likely old  granulomatous disease.  3. Calcified and noncalcified prominent mediastinal lymph nodes. These  are possibly secondary to prior granulomatous disease although  superimposed malignant lymph nodes cannot be excluded.  4.. Visualized left femoral neck fracture with diffuse osteopenia.  Pathologic fracture not excluded given the findings in impression #1.  No additional suspicious osseous lesions are visualized.  5. Unchanged appearance of the wall thickening with faint surrounding  fat stranding, suggestive of cystitis. Correlate with urinalysis.  6. 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 Stable since prior CT of the abdomen dictated  6/3/2022.

## 2023-03-27 NOTE — PLAN OF CARE
Pt is A&Ox4. Bengali speaking. VSS. LS clear, on RA. BS active, LBM on 3/27/2023. Pt is incontinent of bladder and bowel. Pain managed with oxycodone. Meds crushed and taken with applesauce. L PIV is patent and SL. Pt up with 2 assist. Continue to monitor.     0200: Pt BG 44. 15g PO glucose given. BG recheck is 113. MG notified, plan to hold lantus. Continue to monitor.

## 2023-03-27 NOTE — PLAN OF CARE
"Goal Outcome Evaluation:  VS: /60 (BP Location: Right arm)   Pulse 93   Temp (!) 96.7  F (35.9  C) (Oral)   Resp 16   Ht 1.575 m (5' 2.01\")   Wt 40.8 kg (89 lb 15.2 oz)   SpO2 100%   BMI 16.45 kg/m       O2: SpO2 > 90% and stable on RA. LS clear and equal bilaterally. Denies chest pain and SOB.    Output: Incontinent of urine.   Last BM: LBM 3/26 after suppository. Pt is incontinent and has brief on.     Activity: Pt is not OOB. repositined with assist X2.    Skin: WDL except, left hip incision and healed wound pressure on coccyx.    Pain: Pain managed with prn oxycodone and scheduled tylenol.     CMS: Intact, AOx4. Denies numbness and tingling.   Dressing: New Sacral Mapilex applied to coccyx for prevention.     Diet: Soft and bite sized diet (level 6), slightly thickened liquids (level1). Denies nausea/vomiting. pt had 50% of her meal.   Pills crushed and given with applesauce/pudding.   BG checks before meals 168 and 130 at bedtime. Given sliding scale insulin per protocol.   LDA:  PIV SL  Into left forearm.   Equipment: IV pole, personal belongings,    Plan: Airborne  precautions maintained / Continue with plan of care. Call light within reach, pt able to make needs known.    Additional Info:                              "

## 2023-03-27 NOTE — PROGRESS NOTES
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 Suspicion for urosepsis given hypotension and tachycardia and UA positive for nitrites, leukocyte esterase and large amounts of WBCs. Patient transfer to [facility] [place] for Left Hip Arthroplasty. Surgical procedure was uncomplicated and patient is in stable condition.      # Hx of CVA c/b Hemiparesis and Hemiplegia  -Stable neuro status.  Functional status improving to the point that family feels they can manage her at home     # Right upper lobe spiculated mass:    Seen  on the CT scan on 3/20/2023.  Pulmonology consulted and case discussed.  Pulmonary has advised to rule out tuberculosis.  -Airborne isolation placed  -RT has not been able to induce sputum as noted above.  -inpatient bronch plan for 3/28    # Stress hyperglycemia superimposed on   # Diabetes Mellitus type II   Blood glucoses have been trending down particular in the morning.  Currently receiving Lantus 30 units twice daily plus metformin, not yet back on glipizide.  Fair intake per nursing staff  Plan: Reduce Lantus to 20 units twice daily, continue metformin, continue to hold glipizide.  We will need to reassess at time of discharge as patient is currently on considerably less medication than baseline    # Left femoral neck fracture due to mechanical fall   # S/p LEFT hip hemiarthroplasty on 3/21   Regional anesthesia placed left iliac fascia block at [place].  Head CT negative for trauma x 2. trauma eval completed prior to transfer to [facility] [place]   -  Fall precautions  - PT/OT.  Appreciate PT OT  "recommendations  - Posterior hip precautions. WBAT. Up with assist     # Positive blood culture from 3/20/2023: Blood culture from Paenibacillus species.    F/u BC neg, likely contaminant      # Acute blood loss anemia due to fracture and  surgical blood loss   Hgb 8.7, stable  Plan monitor        # HTN   # HLD  Stable on resumed amlodipine.  Lipitor     # Hyponatremia, resolved  # Hypomagnesia, mild  Plan: Oral magnesium replacement     #  Hx of urinary retention  History of recurrent UTI  Positive urine culture on admission likely representing contaminant  Status post 5-day course of Rocephin  No further treatment indicated.  Family to consider outpatient urology evaluation  Bowel regimen as patient has been constipated since surgery       Diet: Soft & Bite Sized Diet (level 6) Slightly Thick (level 1)  Snacks/Supplements Adult: Ensure Enlive; With Meals    DVT Prophylaxis: Enoxaparin (Lovenox) SQ  Modi Catheter: Not present  Lines: None     Cardiac Monitoring: None  Code Status: Full Code      Clinically Significant Risk Factors            # Hypomagnesemia: Lowest Mg = 1.6 mg/dL in last 2 days, will replace as needed             # Cachexia: Estimated body mass index is 16.45 kg/m  as calculated from the following:    Height as of this encounter: 1.575 m (5' 2.01\").    Weight as of this encounter: 40.8 kg (89 lb 15.2 oz).   # Severe Malnutrition: based on nutrition assessment        Disposition Plan     Expected Discharge Date: 03/27/2023,  3:00 PM    Destination: home            Karma Lombardi MD  Hospitalist Service, GOLD TEAM 19  [facility]  Securely message with [employer] (more info)  Text page via [facility] Paging/Directory   See signed in provider for up to date coverage information  ______________________________________________________________________    Interval History   No distress  pulm plans bronch tomorrow 3/28 AM   No chest pain/sob/NVD    Physical Exam "   Vital Signs: Temp: (!) 96.4  F (35.8  C) Temp src: Oral BP: 131/66 Pulse: 82   Resp: 16 SpO2: 99 % O2 Device: None (Room air)    Weight: 89 lbs 15.16 oz    General: No acute distress, breathing comfortably on room air  Neuro: EOMI, PERRLA. Facial muscles symmetric, strength/sensation grossly intact.  HEENT: Anicteric sclera. Oropharynx is clear. No lymphadenopathy.  Chest/Lungs: No accessory respiratory muscle use. Adequate air movement throughout. CTAB.  CV: Normal rate, regular rhythm. nl S1/S2. No m/r/g. Cap refill &lt;2s.  Abd: BS+. Soft, NTND.  Ext: Warm, well-perfused. Strong distal pulses      Medical Decision Making       40 MINUTES SPENT BY ME on the date of service doing chart review, history, exam, documentation & further activities per the note.      Data     I have personally reviewed the following data over the past 24 hrs:    8.9  \   9.2 (L)   / 248     141 104 15.9 /  103 (H)   4.7 26 0.77 \       Imaging results reviewed over the past 24 hrs:   No results found for this or any previous visit (from the past 24 hour(s)).  Recent Labs   Lab 03/27/23  0742 03/27/23  0738 03/27/23  0330 03/27/23  0300 03/26/23  1236 03/26/23  1001 03/25/23  1333 03/25/23  0551 03/24/23  1722 03/24/23  1249 03/20/23  2047 03/20/23  1652   WBC  --  8.9  --   --   --  10.2  --  9.5  --  9.5   < > 10.2   HGB  --  9.2*  --   --   --  9.4*  --  8.7*  --  8.8*   < > 11.4*   MCV  --  100  --   --   --  101*  --  101*  --  102*   < > 104*   PLT  --  248  --   --   --  263  --  211  --  172   < > 210   INR  --   --   --   --   --   --   --   --   --   --   --  1.06   NA  --   --   --   --   --  141  --  139  --  138   < > 140   POTASSIUM  --   --   --   --   --  4.7  --  4.2  --  4.0   < > 4.0   CHLORIDE  --   --   --   --   --  104  --  106  --  105   < > 107   CO2  --   --   --   --   --  26  --  25  --  23   < > 20*   BUN  --   --   --   --   --  15.9  --  12.7  --  16.0   < > 17.2   CR  --   --   --   --   --  0.77  --   0.80  --  0.83   < > 0.78   ANIONGAP  --   --   --   --   --  11  --  8  --  10   < > 13   MARY  --   --   --   --   --  9.6  --  8.7*  --  8.7*   < > 9.1   *  --  113* 58*   < > 155*   < > 70  70   < > 242*   < > 192*   ALBUMIN  --   --   --   --   --   --   --   --   --   --   --  3.5   PROTTOTAL  --   --   --   --   --   --   --   --   --   --   --  6.0*   BILITOTAL  --   --   --   --   --   --   --   --   --   --   --  0.4   ALKPHOS  --   --   --   --   --   --   --   --   --   --   --  43   ALT  --   --   --   --   --   --   --   --   --   --   --  7*   AST  --   --   --   --   --   --   --   --   --   --   --  19   LIPASE  --   --   --   --   --   --   --   --   --   --   --  12*    < > = values in this interval not displayed.

## 2023-03-27 NOTE — PROGRESS NOTES
"Pulmonary Consult Note    Date of Service: 03/27/23    Assessment and Recommendations:  88F CVA w/ R hemiplegia (on clopidogrel), DM2, hx Tb s/p 4mo RIPE therapy (2010) admitted 3/20 following ground-level fall. On trauma eval, found to have spiculated RUL mass, 2.1x2x3.1cm, w/ areas of calcification. Prior CT in 2020 w/ ground-glass nodule in this area, this image is not available to me. This is concerning for adenocarcinoma vs tuberculosis vs other malignancy. Unfortunately, we have been unable to obtain induced sputum. Will pursue bronchoscopy to further evaluate.     - bronchoscopy scheduled for 0800 3/28/23 in OR  - consent signed and in chart  - continue airborne precautions     Chief Complaint   Patient presents with     Fall       Summary:  On RA. Afebrile. Without leukocytosis. Denies cough. Denies SOB. Denies CP. She has known prior Tb hx > 10 years ago. 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 No recent Tb exposures. She is a lifelong non-smoker, but does have second-hand smoke exposure. No known lung Ca hx in the family.     10 point review of systems negative, aside from that mentioned above    /66 (BP Location: Right arm)   Pulse 82   Temp (!) 96.4  F (35.8  C) (Oral)   Resp 16   Ht 1.575 m (5' 2.01\")   Wt 40.8 kg (89 lb 15.2 oz)   SpO2 99%   BMI 16.45 kg/m    Gen: NAD  HEENT: anicteric, OP clear, Mallampati IV  Card: RRR  Pulm: clear bilaterally   Abd: soft, NTND  MSK: no LE edema, no acute joint abnormalities  Skin: no obvious rash  Psych: normal affect  Neuro: answering questions appropriately     Labs: personally reviewed    Imaging: personally reviewed    Past Medical History:   Diagnosis Date     Diabetes mellitus (H)      Unspecified cerebral artery occlusion with cerebral infarction        Past Surgical History:   Procedure Laterality Date     BACK SURGERY       HEMIARTHROPLASTY HIP Left 3/21/2023    Procedure: Left Hip " Hemiarthroplasty;  Surgeon: Jeffrey Paulino MD;  Location: [facility] OR     ORTHOPEDIC SURGERY         No family history on file.    Social History     Socioeconomic History     Marital status:      Spouse name: Not on file     Number of children: Not on file     Years of education: Not on file     Highest education level: Not on file   Occupational History     Not on file   Tobacco Use     Smoking status: Never     Smokeless tobacco: Never   Substance and Sexual Activity     Alcohol use: Not Currently     Drug use: Never     Sexual activity: Not Currently   Other Topics Concern     Not on file   Social History Narrative     Not on file     Social Determinants of Health     Financial Resource Strain: Not on file   Food Insecurity: Not on file   Transportation Needs: Not on file   Physical Activity: Not on file   Stress: Not on file   Social Connections: Not on file   Intimate Partner Violence: Not on file   Housing Stability: Not on file       Harpreet Bower MD  Pulmonary and Critical Care Medicine  [facility]

## 2023-03-28 ENCOUNTER — ANESTHESIA (OUTPATIENT)
Dept: SURGERY | Facility: CLINIC | Age: 88
DRG: 521 | End: 2023-03-28
Payer: COMMERCIAL

## 2023-03-28 ENCOUNTER — ANESTHESIA EVENT (OUTPATIENT)
Dept: SURGERY | Facility: CLINIC | Age: 88
DRG: 521 | End: 2023-03-28
Payer: COMMERCIAL

## 2023-03-28 LAB
% LINING CELLS, BODY FLUID: 2 %
ANION GAP SERPL CALCULATED.3IONS-SCNC: 12 MMOL/L (ref 7–15)
APPEARANCE FLD: CLEAR
BRONCHOSCOPY: NORMAL
BUN SERPL-MCNC: 31.7 MG/DL (ref 8–23)
C PNEUM DNA SPEC QL NAA+PROBE: NOT DETECTED
CALCIUM SERPL-MCNC: 9.5 MG/DL (ref 8.8–10.2)
CD19 CELLS NFR BRONCH: 1 %
CD3 CELLS NFR BRONCH: 88 %
CD3+CD4+ CELLS NFR BRONCH: 59 %
CD3+CD4+ CELLS/CD3+CD8+ CLL BRONCH: 1.9 %
CD3+CD8+ CELLS NFR BRONCH: 31 %
CD3-CD16+CD56+ CELLS NFR SPEC: 8 %
CELL COUNT BODY FLUID SOURCE: NORMAL
CHLORIDE SERPL-SCNC: 97 MMOL/L (ref 98–107)
COLOR FLD: COLORLESS
CREAT SERPL-MCNC: 0.93 MG/DL (ref 0.51–0.95)
DEPRECATED HCO3 PLAS-SCNC: 25 MMOL/L (ref 22–29)
EOSINOPHIL NFR FLD MANUAL: 2 %
ERYTHROCYTE [DISTWIDTH] IN BLOOD BY AUTOMATED COUNT: 14.6 % (ref 10–15)
FLUAV H1 2009 PAND RNA SPEC QL NAA+PROBE: NOT DETECTED
FLUAV H1 RNA SPEC QL NAA+PROBE: NOT DETECTED
FLUAV H3 RNA SPEC QL NAA+PROBE: NOT DETECTED
FLUAV RNA SPEC QL NAA+PROBE: NOT DETECTED
FLUBV RNA SPEC QL NAA+PROBE: NOT DETECTED
GFR SERPL CREATININE-BSD FRML MDRD: 59 ML/MIN/1.73M2
GLUCOSE BLDC GLUCOMTR-MCNC: 101 MG/DL (ref 70–99)
GLUCOSE BLDC GLUCOMTR-MCNC: 108 MG/DL (ref 70–99)
GLUCOSE BLDC GLUCOMTR-MCNC: 155 MG/DL (ref 70–99)
GLUCOSE BLDC GLUCOMTR-MCNC: 255 MG/DL (ref 70–99)
GLUCOSE BLDC GLUCOMTR-MCNC: 271 MG/DL (ref 70–99)
GLUCOSE SERPL-MCNC: 262 MG/DL (ref 70–99)
GRAM STAIN RESULT: NORMAL
GRAM STAIN RESULT: NORMAL
HADV DNA SPEC QL NAA+PROBE: NOT DETECTED
HCOV PNL SPEC NAA+PROBE: NOT DETECTED
HCT VFR BLD AUTO: 25 % (ref 35–47)
HGB BLD-MCNC: 8.2 G/DL (ref 11.7–15.7)
HMPV RNA SPEC QL NAA+PROBE: NOT DETECTED
HPIV1 RNA SPEC QL NAA+PROBE: NOT DETECTED
HPIV2 RNA SPEC QL NAA+PROBE: NOT DETECTED
HPIV3 RNA SPEC QL NAA+PROBE: NOT DETECTED
HPIV4 RNA SPEC QL NAA+PROBE: NOT DETECTED
LYMPHOCYTE SUBSET BRONCHIAL EXTERNAL COMMENT: NORMAL
LYMPHOCYTES NFR FLD MANUAL: 2 %
M PNEUMO DNA SPEC QL NAA+PROBE: NOT DETECTED
MCH RBC QN AUTO: 33.2 PG (ref 26.5–33)
MCHC RBC AUTO-ENTMCNC: 32.8 G/DL (ref 31.5–36.5)
MCV RBC AUTO: 101 FL (ref 78–100)
MONOS+MACROS NFR FLD MANUAL: NORMAL %
NEUTS BAND NFR FLD MANUAL: 7 %
OTHER CELLS FLD MANUAL: 87 %
PLATELET # BLD AUTO: 288 10E3/UL (ref 150–450)
POTASSIUM SERPL-SCNC: 5.6 MMOL/L (ref 3.4–5.3)
RBC # BLD AUTO: 2.47 10E6/UL (ref 3.8–5.2)
RSV RNA SPEC QL NAA+PROBE: NOT DETECTED
RSV RNA SPEC QL NAA+PROBE: NOT DETECTED
RV+EV RNA SPEC QL NAA+PROBE: NOT DETECTED
SODIUM SERPL-SCNC: 134 MMOL/L (ref 136–145)
WBC # BLD AUTO: 9.1 10E3/UL (ref 4–11)

## 2023-03-28 PROCEDURE — 86356 MONONUCLEAR CELL ANTIGEN: CPT | Performed by: STUDENT IN AN ORGANIZED HEALTH CARE EDUCATION/TRAINING PROGRAM

## 2023-03-28 PROCEDURE — 88108 CYTOPATH CONCENTRATE TECH: CPT | Mod: 26 | Performed by: PATHOLOGY

## 2023-03-28 PROCEDURE — 89051 BODY FLUID CELL COUNT: CPT | Performed by: STUDENT IN AN ORGANIZED HEALTH CARE EDUCATION/TRAINING PROGRAM

## 2023-03-28 PROCEDURE — 250N000013 HC RX MED GY IP 250 OP 250 PS 637: Performed by: INTERNAL MEDICINE

## 2023-03-28 PROCEDURE — 31624 DX BRONCHOSCOPE/LAVAGE: CPT | Performed by: STUDENT IN AN ORGANIZED HEALTH CARE EDUCATION/TRAINING PROGRAM

## 2023-03-28 PROCEDURE — 88313 SPECIAL STAINS GROUP 2: CPT | Mod: TC | Performed by: STUDENT IN AN ORGANIZED HEALTH CARE EDUCATION/TRAINING PROGRAM

## 2023-03-28 PROCEDURE — 370N000017 HC ANESTHESIA TECHNICAL FEE, PER MIN: Performed by: STUDENT IN AN ORGANIZED HEALTH CARE EDUCATION/TRAINING PROGRAM

## 2023-03-28 PROCEDURE — 250N000013 HC RX MED GY IP 250 OP 250 PS 637: Performed by: STUDENT IN AN ORGANIZED HEALTH CARE EDUCATION/TRAINING PROGRAM

## 2023-03-28 PROCEDURE — 250N000011 HC RX IP 250 OP 636: Performed by: REGISTERED NURSE

## 2023-03-28 PROCEDURE — 89050 BODY FLUID CELL COUNT: CPT | Performed by: STUDENT IN AN ORGANIZED HEALTH CARE EDUCATION/TRAINING PROGRAM

## 2023-03-28 PROCEDURE — 250N000013 HC RX MED GY IP 250 OP 250 PS 637: Performed by: PHYSICIAN ASSISTANT

## 2023-03-28 PROCEDURE — 250N000009 HC RX 250: Performed by: STUDENT IN AN ORGANIZED HEALTH CARE EDUCATION/TRAINING PROGRAM

## 2023-03-28 PROCEDURE — 87102 FUNGUS ISOLATION CULTURE: CPT | Performed by: STUDENT IN AN ORGANIZED HEALTH CARE EDUCATION/TRAINING PROGRAM

## 2023-03-28 PROCEDURE — 87798 DETECT AGENT NOS DNA AMP: CPT | Performed by: STUDENT IN AN ORGANIZED HEALTH CARE EDUCATION/TRAINING PROGRAM

## 2023-03-28 PROCEDURE — 999N000141 HC STATISTIC PRE-PROCEDURE NURSING ASSESSMENT: Performed by: STUDENT IN AN ORGANIZED HEALTH CARE EDUCATION/TRAINING PROGRAM

## 2023-03-28 PROCEDURE — 87206 SMEAR FLUORESCENT/ACID STAI: CPT | Performed by: STUDENT IN AN ORGANIZED HEALTH CARE EDUCATION/TRAINING PROGRAM

## 2023-03-28 PROCEDURE — 250N000013 HC RX MED GY IP 250 OP 250 PS 637: Performed by: SURGERY

## 2023-03-28 PROCEDURE — 250N000013 HC RX MED GY IP 250 OP 250 PS 637: Performed by: NURSE PRACTITIONER

## 2023-03-28 PROCEDURE — 88305 TISSUE EXAM BY PATHOLOGIST: CPT | Mod: 26 | Performed by: PATHOLOGY

## 2023-03-28 PROCEDURE — 360N000076 HC SURGERY LEVEL 3, PER MIN: Performed by: STUDENT IN AN ORGANIZED HEALTH CARE EDUCATION/TRAINING PROGRAM

## 2023-03-28 PROCEDURE — 87015 SPECIMEN INFECT AGNT CONCNTJ: CPT | Performed by: STUDENT IN AN ORGANIZED HEALTH CARE EDUCATION/TRAINING PROGRAM

## 2023-03-28 PROCEDURE — 87581 M.PNEUMON DNA AMP PROBE: CPT | Performed by: STUDENT IN AN ORGANIZED HEALTH CARE EDUCATION/TRAINING PROGRAM

## 2023-03-28 PROCEDURE — 87070 CULTURE OTHR SPECIMN AEROBIC: CPT | Performed by: STUDENT IN AN ORGANIZED HEALTH CARE EDUCATION/TRAINING PROGRAM

## 2023-03-28 PROCEDURE — 272N000001 HC OR GENERAL SUPPLY STERILE: Performed by: STUDENT IN AN ORGANIZED HEALTH CARE EDUCATION/TRAINING PROGRAM

## 2023-03-28 PROCEDURE — 80048 BASIC METABOLIC PNL TOTAL CA: CPT

## 2023-03-28 PROCEDURE — 85027 COMPLETE CBC AUTOMATED: CPT

## 2023-03-28 PROCEDURE — 99232 SBSQ HOSP IP/OBS MODERATE 35: CPT | Performed by: INTERNAL MEDICINE

## 2023-03-28 PROCEDURE — 87116 MYCOBACTERIA CULTURE: CPT | Performed by: STUDENT IN AN ORGANIZED HEALTH CARE EDUCATION/TRAINING PROGRAM

## 2023-03-28 PROCEDURE — 36415 COLL VENOUS BLD VENIPUNCTURE: CPT

## 2023-03-28 PROCEDURE — 250N000025 HC SEVOFLURANE, PER MIN: Performed by: STUDENT IN AN ORGANIZED HEALTH CARE EDUCATION/TRAINING PROGRAM

## 2023-03-28 PROCEDURE — 0B9C8ZX DRAINAGE OF RIGHT UPPER LUNG LOBE, VIA NATURAL OR ARTIFICIAL OPENING ENDOSCOPIC, DIAGNOSTIC: ICD-10-PCS | Performed by: STUDENT IN AN ORGANIZED HEALTH CARE EDUCATION/TRAINING PROGRAM

## 2023-03-28 PROCEDURE — 250N000009 HC RX 250: Performed by: REGISTERED NURSE

## 2023-03-28 PROCEDURE — 87486 CHLMYD PNEUM DNA AMP PROBE: CPT | Performed by: STUDENT IN AN ORGANIZED HEALTH CARE EDUCATION/TRAINING PROGRAM

## 2023-03-28 PROCEDURE — 250N000011 HC RX IP 250 OP 636: Performed by: PHYSICIAN ASSISTANT

## 2023-03-28 PROCEDURE — 88313 SPECIAL STAINS GROUP 2: CPT | Mod: 26 | Performed by: PATHOLOGY

## 2023-03-28 PROCEDURE — 120N000002 HC R&B MED SURG/OB UMMC

## 2023-03-28 PROCEDURE — 710N000010 HC RECOVERY PHASE 1, LEVEL 2, PER MIN: Performed by: STUDENT IN AN ORGANIZED HEALTH CARE EDUCATION/TRAINING PROGRAM

## 2023-03-28 PROCEDURE — 258N000003 HC RX IP 258 OP 636: Performed by: REGISTERED NURSE

## 2023-03-28 RX ORDER — HYDROMORPHONE HYDROCHLORIDE 1 MG/ML
0.2 INJECTION, SOLUTION INTRAMUSCULAR; INTRAVENOUS; SUBCUTANEOUS EVERY 5 MIN PRN
Status: DISCONTINUED | OUTPATIENT
Start: 2023-03-28 | End: 2023-03-28 | Stop reason: HOSPADM

## 2023-03-28 RX ORDER — ONDANSETRON 2 MG/ML
INJECTION INTRAMUSCULAR; INTRAVENOUS PRN
Status: DISCONTINUED | OUTPATIENT
Start: 2023-03-28 | End: 2023-03-28

## 2023-03-28 RX ORDER — OXYCODONE HYDROCHLORIDE 10 MG/1
10 TABLET ORAL
Status: DISCONTINUED | OUTPATIENT
Start: 2023-03-28 | End: 2023-03-28 | Stop reason: HOSPADM

## 2023-03-28 RX ORDER — LIDOCAINE HYDROCHLORIDE 10 MG/ML
INJECTION, SOLUTION INFILTRATION; PERINEURAL PRN
Status: DISCONTINUED | OUTPATIENT
Start: 2023-03-28 | End: 2023-03-28 | Stop reason: HOSPADM

## 2023-03-28 RX ORDER — DEXAMETHASONE SODIUM PHOSPHATE 4 MG/ML
INJECTION, SOLUTION INTRA-ARTICULAR; INTRALESIONAL; INTRAMUSCULAR; INTRAVENOUS; SOFT TISSUE PRN
Status: DISCONTINUED | OUTPATIENT
Start: 2023-03-28 | End: 2023-03-28

## 2023-03-28 RX ORDER — FENTANYL CITRATE 50 UG/ML
25 INJECTION, SOLUTION INTRAMUSCULAR; INTRAVENOUS EVERY 5 MIN PRN
Status: DISCONTINUED | OUTPATIENT
Start: 2023-03-28 | End: 2023-03-28 | Stop reason: HOSPADM

## 2023-03-28 RX ORDER — ONDANSETRON 2 MG/ML
4 INJECTION INTRAMUSCULAR; INTRAVENOUS EVERY 30 MIN PRN
Status: DISCONTINUED | OUTPATIENT
Start: 2023-03-28 | End: 2023-03-28 | Stop reason: HOSPADM

## 2023-03-28 RX ORDER — ONDANSETRON 4 MG/1
4 TABLET, ORALLY DISINTEGRATING ORAL EVERY 30 MIN PRN
Status: DISCONTINUED | OUTPATIENT
Start: 2023-03-28 | End: 2023-03-28 | Stop reason: HOSPADM

## 2023-03-28 RX ORDER — OXYCODONE HYDROCHLORIDE 5 MG/1
5 TABLET ORAL
Status: DISCONTINUED | OUTPATIENT
Start: 2023-03-28 | End: 2023-03-28 | Stop reason: HOSPADM

## 2023-03-28 RX ORDER — MAGNESIUM HYDROXIDE 1200 MG/15ML
LIQUID ORAL PRN
Status: DISCONTINUED | OUTPATIENT
Start: 2023-03-28 | End: 2023-03-28 | Stop reason: HOSPADM

## 2023-03-28 RX ORDER — LIDOCAINE HYDROCHLORIDE 20 MG/ML
INJECTION, SOLUTION INFILTRATION; PERINEURAL PRN
Status: DISCONTINUED | OUTPATIENT
Start: 2023-03-28 | End: 2023-03-28

## 2023-03-28 RX ORDER — FENTANYL CITRATE 50 UG/ML
50 INJECTION, SOLUTION INTRAMUSCULAR; INTRAVENOUS EVERY 5 MIN PRN
Status: DISCONTINUED | OUTPATIENT
Start: 2023-03-28 | End: 2023-03-28 | Stop reason: HOSPADM

## 2023-03-28 RX ORDER — SODIUM CHLORIDE, SODIUM LACTATE, POTASSIUM CHLORIDE, CALCIUM CHLORIDE 600; 310; 30; 20 MG/100ML; MG/100ML; MG/100ML; MG/100ML
INJECTION, SOLUTION INTRAVENOUS CONTINUOUS PRN
Status: DISCONTINUED | OUTPATIENT
Start: 2023-03-28 | End: 2023-03-28

## 2023-03-28 RX ORDER — PROPOFOL 10 MG/ML
INJECTION, EMULSION INTRAVENOUS CONTINUOUS PRN
Status: DISCONTINUED | OUTPATIENT
Start: 2023-03-28 | End: 2023-03-28

## 2023-03-28 RX ORDER — HYDROMORPHONE HYDROCHLORIDE 1 MG/ML
0.4 INJECTION, SOLUTION INTRAMUSCULAR; INTRAVENOUS; SUBCUTANEOUS EVERY 5 MIN PRN
Status: DISCONTINUED | OUTPATIENT
Start: 2023-03-28 | End: 2023-03-28 | Stop reason: HOSPADM

## 2023-03-28 RX ORDER — SODIUM CHLORIDE, SODIUM LACTATE, POTASSIUM CHLORIDE, CALCIUM CHLORIDE 600; 310; 30; 20 MG/100ML; MG/100ML; MG/100ML; MG/100ML
INJECTION, SOLUTION INTRAVENOUS CONTINUOUS
Status: DISCONTINUED | OUTPATIENT
Start: 2023-03-28 | End: 2023-03-28 | Stop reason: HOSPADM

## 2023-03-28 RX ORDER — PROPOFOL 10 MG/ML
INJECTION, EMULSION INTRAVENOUS PRN
Status: DISCONTINUED | OUTPATIENT
Start: 2023-03-28 | End: 2023-03-28

## 2023-03-28 RX ADMIN — METFORMIN HYDROCHLORIDE 500 MG: 500 TABLET ORAL at 17:34

## 2023-03-28 RX ADMIN — LIDOCAINE HYDROCHLORIDE 40 MG: 20 INJECTION, SOLUTION INFILTRATION; PERINEURAL at 08:06

## 2023-03-28 RX ADMIN — FEXOFENADINE HYDROCHLORIDE 90 MG: 30 SUSPENSION ORAL at 19:59

## 2023-03-28 RX ADMIN — ACETAMINOPHEN 650 MG: 325 SOLUTION ORAL at 09:58

## 2023-03-28 RX ADMIN — AMLODIPINE BESYLATE 2.5 MG: 2.5 TABLET ORAL at 11:20

## 2023-03-28 RX ADMIN — PROPOFOL 200 MCG/KG/MIN: 10 INJECTION, EMULSION INTRAVENOUS at 08:06

## 2023-03-28 RX ADMIN — POLYETHYLENE GLYCOL 3350 17 G: 17 POWDER, FOR SOLUTION ORAL at 11:20

## 2023-03-28 RX ADMIN — HYDROCORTISONE: 1 CREAM TOPICAL at 21:15

## 2023-03-28 RX ADMIN — MAGNESIUM OXIDE TAB 400 MG (241.3 MG ELEMENTAL MG) 400 MG: 400 (241.3 MG) TAB at 19:59

## 2023-03-28 RX ADMIN — ONDANSETRON 4 MG: 2 INJECTION INTRAMUSCULAR; INTRAVENOUS at 08:29

## 2023-03-28 RX ADMIN — PROPOFOL 100 MG: 10 INJECTION, EMULSION INTRAVENOUS at 07:55

## 2023-03-28 RX ADMIN — ATORVASTATIN CALCIUM 20 MG: 20 TABLET, FILM COATED ORAL at 11:20

## 2023-03-28 RX ADMIN — SENNOSIDES AND DOCUSATE SODIUM 1 TABLET: 50; 8.6 TABLET ORAL at 11:21

## 2023-03-28 RX ADMIN — METFORMIN HYDROCHLORIDE 500 MG: 500 TABLET ORAL at 11:21

## 2023-03-28 RX ADMIN — SUGAMMADEX 180 MG: 100 INJECTION, SOLUTION INTRAVENOUS at 08:29

## 2023-03-28 RX ADMIN — SENNOSIDES AND DOCUSATE SODIUM 1 TABLET: 50; 8.6 TABLET ORAL at 19:59

## 2023-03-28 RX ADMIN — ENOXAPARIN SODIUM 30 MG: 30 INJECTION SUBCUTANEOUS at 17:34

## 2023-03-28 RX ADMIN — SODIUM CHLORIDE, SODIUM LACTATE, POTASSIUM CHLORIDE, CALCIUM CHLORIDE: 600; 310; 30; 20 INJECTION, SOLUTION INTRAVENOUS at 07:55

## 2023-03-28 RX ADMIN — MIRTAZAPINE 7.5 MG: 7.5 TABLET, FILM COATED ORAL at 21:51

## 2023-03-28 RX ADMIN — Medication 20 MG: at 08:06

## 2023-03-28 RX ADMIN — ACETAMINOPHEN 975 MG: 325 TABLET, FILM COATED ORAL at 17:34

## 2023-03-28 RX ADMIN — DEXAMETHASONE SODIUM PHOSPHATE 4 MG: 4 INJECTION, SOLUTION INTRA-ARTICULAR; INTRALESIONAL; INTRAMUSCULAR; INTRAVENOUS; SOFT TISSUE at 08:29

## 2023-03-28 ASSESSMENT — ACTIVITIES OF DAILY LIVING (ADL)
ADLS_ACUITY_SCORE: 78
ADLS_ACUITY_SCORE: 76
ADLS_ACUITY_SCORE: 78
ADLS_ACUITY_SCORE: 76

## 2023-03-28 NOTE — ANESTHESIA PROCEDURE NOTES
Airway       Patient location during procedure: OR       Procedure Start/Stop Times: 3/28/2023 8:09 AM  Staff -        Anesthesiologist:  Joseph Che MD       CRNA: Audra Sosa APRN CRNA       Performed By: CRNA  Consent for Airway        Urgency: elective  Indications and Patient Condition       Indications for airway management: ashley-procedural       Induction type:intravenous       Mask difficulty assessment: 2 - vent by mask + OA or adjuvant +/- NMBA    Final Airway Details       Final airway type: endotracheal airway       Successful airway: ETT - single  Endotracheal Airway Details        ETT size (mm): 7.5       Cuffed: yes       Successful intubation technique: direct laryngoscopy       DL Blade Type: Powell 2       Grade View of Cords: 1       Adjucts: stylet       Position: Right       Measured from: lips       Secured at (cm): 21       Bite block used: None    Post intubation assessment        Placement verified by: capnometry, equal breath sounds and chest rise        Number of attempts at approach: 1       Secured with: silk tape       Ease of procedure: easy       Dentition: Unchanged    Medication(s) Administered   Medication Administration Time: 3/28/2023 8:09 AM

## 2023-03-28 NOTE — PROGRESS NOTES
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 Suspicion for urosepsis given hypotension and tachycardia and UA positive for nitrites, leukocyte esterase and large amounts of WBCs. Patient transfer to [facility] for Left Hip Arthroplasty. Surgical procedure was uncomplicated and patient is in stable condition.      # Hx of CVA c/b Hemiparesis and Hemiplegia  -Stable neuro status.  Functional status improving to the point that family feels they can manage her at home     # Right upper lobe spiculated mass:    Seen  on the CT scan on 3/20/2023.  Pulmonology consulted and case discussed.  Pulmonary has advised to rule out tuberculosis.  -Airborne isolation placed  -RT has not been able to induce sputum as noted above.  -inpatient bronch plan for 3/28, labs pending     # Stress hyperglycemia superimposed on   # Diabetes Mellitus type II   Blood glucoses have been trending down particular in the morning.  Currently receiving Lantus 30 units twice daily plus metformin, not yet back on glipizide.  Fair intake per nursing staff  Plan: Reduce Lantus to 20 units twice daily, continue metformin, continue to hold glipizide.  We will need to reassess at time of discharge as patient is currently on considerably less medication than baseline    # Left femoral neck fracture due to mechanical fall   # S/p LEFT hip hemiarthroplasty on 3/21   Regional anesthesia placed left iliac fascia block at [place].  Head CT negative for trauma x 2. trauma eval completed prior to transfer to [facility]   -  Fall precautions  - PT/OT.  Appreciate PT OT  "recommendations  - Posterior hip precautions. WBAT. Up with assist     # Positive blood culture from 3/20/2023: Blood culture from Paenibacillus species.    F/u BC neg, likely contaminant      # Acute blood loss anemia due to fracture and  surgical blood loss   Hgb 8.7, stable  Plan monitor        # HTN   # HLD  Stable on resumed amlodipine.  Lipitor     # Hyponatremia, resolved  # Hypomagnesia, mild  Plan: Oral magnesium replacement     #  Hx of urinary retention  #sepsis ruled out   History of recurrent UTI  Positive urine culture on admission likely representing contaminant  Status post 5-day course of Rocephin  No further treatment indicated.  Family to consider outpatient urology evaluation  Bowel regimen as patient has been constipated since surgery       Diet: Snacks/Supplements Adult: Ensure Enlive; With Meals  NPO per Anesthesia Guidelines for Procedure/Surgery Except for: No Exceptions    DVT Prophylaxis: Enoxaparin (Lovenox) SQ  Modi Catheter: Not present  Lines: None     Cardiac Monitoring: None  Code Status: Full Code      Clinically Significant Risk Factors                        # Cachexia: Estimated body mass index is 16.45 kg/m  as calculated from the following:    Height as of this encounter: 1.575 m (5' 2.01\").    Weight as of this encounter: 40.8 kg (89 lb 15.2 oz).   # Severe Malnutrition: based on nutrition assessment        Disposition Plan           Karma Lombardi MD  Hospitalist Service, GOLD TEAM [address] [place] [address]  Securely message with [employer] (more info)  Text page via [facility] Paging/Directory   See signed in provider for up to date coverage information  ______________________________________________________________________    Interval History     S/p bronch - tolerated well  Labs pending  No chest pain/sob.NVD  Eating lunch OK     Physical Exam   Vital Signs: Temp: 97.8  F (36.6  C) Temp src: Oral BP: (!) 140/72 Pulse: 79   Resp: 16 SpO2: 95 " % O2 Device: None (Room air)    Weight: 89 lbs 15.16 oz    General: No acute distress, breathing comfortably on room air  Neuro: EOMI, PERRLA. Facial muscles symmetric, strength/sensation grossly intact.  HEENT: Anicteric sclera. Oropharynx is clear. No lymphadenopathy.  Chest/Lungs: No accessory respiratory muscle use. Adequate air movement throughout. CTAB.  CV: Normal rate, regular rhythm. nl S1/S2. No m/r/g. Cap refill &lt;2s.  Abd: BS+. Soft, NTND.  Ext: Warm, well-perfused. Strong distal pulses      Medical Decision Making       40 MINUTES SPENT BY ME on the date of service doing chart review, history, exam, documentation & further activities per the note.      Data     I have personally reviewed the following data over the past 24 hrs:    8.9  \   9.2 (L)   / 248     139 103 19.3 /  108 (H)   4.8 28 0.85 \       Imaging results reviewed over the past 24 hrs:   No results found for this or any previous visit (from the past 24 hour(s)).  Recent Labs   Lab 03/28/23  0243 03/27/23  2128 03/27/23  1736 03/27/23  0742 03/27/23  0738 03/26/23  1236 03/26/23  1001 03/25/23  1333 03/25/23  0551   WBC  --   --   --   --  8.9  --  10.2  --  9.5   HGB  --   --   --   --  9.2*  --  9.4*  --  8.7*   MCV  --   --   --   --  100  --  101*  --  101*   PLT  --   --   --   --  248  --  263  --  211   NA  --   --   --   --  139  --  141  --  139   POTASSIUM  --   --   --   --  4.8  --  4.7  --  4.2   CHLORIDE  --   --   --   --  103  --  104  --  106   CO2  --   --   --   --  28  --  26  --  25   BUN  --   --   --   --  19.3  --  15.9  --  12.7   CR  --   --   --   --  0.85  --  0.77  --  0.80   ANIONGAP  --   --   --   --  8  --  11  --  8   MARY  --   --   --   --  9.5  --  9.6  --  8.7*   * 247* 180*   < > 102*   < > 155*   < > 70  70    < > = values in this interval not displayed.

## 2023-03-28 NOTE — PLAN OF CARE
VS: VSS, pt denied CP or SOB.   O2: Room air sat. > 90%.   Output: Voiding adequate amount incontinent of bowel and bladder.    Last BM: 03/26/23 passing gas.    Activity: Repositioned in bed, not out of bed today.    Skin: Intact except surgical incision and some redness on coccyx GEOFF protective dressing on..    Pain: Denied pain or discomfort, scheduled Tylenol given.    Neuro: CMS and neuro intact to baseline.    Dressing: Slight dry drainage noted.    Diet: Slightly tick fluid, and on soft and Bite sized diet level 6    LDA: PIV SL.    Equipment: Walker, and personal belongings.    Plan: TBD.    Additional Info:  pt came back from PACU after a procedure about 10 am via bed, pt denied pain or discomfort, resting comfortably, makes need known call light in reach, will continue to monitor  bed alarm on for safety, call light in reach.

## 2023-03-28 NOTE — ANESTHESIA POSTPROCEDURE EVALUATION
Patient: Young J Oh    Procedure: Procedure(s):  BRONCHOSCOPY FLEXIBLE - REQUIRES NEGATIVE AIRFLOW ROOM in PACU 28       Anesthesia Type:  General    Note:  Disposition: Inpatient   Postop Pain Control: Uneventful            Sign Out: Well controlled pain   PONV: No   Neuro/Psych: Uneventful            Sign Out: Acceptable/Baseline neuro status   Airway/Respiratory: Uneventful            Sign Out: Acceptable/Baseline resp. status   CV/Hemodynamics: Uneventful            Sign Out: Acceptable CV status; No obvious hypovolemia; No obvious fluid overload   Other NRE: NONE   DID A NON-ROUTINE EVENT OCCUR? No           Last vitals:  Vitals Value Taken Time   /75 03/28/23 0930   Temp 36.1  C (97  F) 03/28/23 0930   Pulse 83 03/28/23 0941   Resp 17 03/28/23 0941   SpO2 95 % 03/28/23 0941   Vitals shown include unvalidated device data.    Electronically Signed By: Joseph Che MD  March 28, 2023  9:42 AM

## 2023-03-28 NOTE — ANESTHESIA CARE TRANSFER NOTE
Patient: Young J Oh    Procedure: Procedure(s):  BRONCHOSCOPY FLEXIBLE - REQUIRES NEGATIVE AIRFLOW ROOM in PACU 28       Diagnosis: Pulmonary mass [R91.8]  Diagnosis Additional Information: No value filed.    Anesthesia Type:   No value filed.     Note:    Oropharynx: oropharynx clear of all foreign objects and spontaneously breathing  Level of Consciousness: drowsy  Oxygen Supplementation: face mask  Level of Supplemental Oxygen (L/min / FiO2): 10  Independent Airway: airway patency satisfactory and stable  Dentition: dentition unchanged  Vital Signs Stable: post-procedure vital signs reviewed and stable  Report to RN Given: handoff report given  Patient transferred to: PACU    Handoff Report: Identifed the Patient, Identified the Reponsible Provider, Reviewed the pertinent medical history, Discussed the surgical course, Reviewed Intra-OP anesthesia mangement and issues during anesthesia, Set expectations for post-procedure period and Allowed opportunity for questions and acknowledgement of understanding      Vitals:  Vitals Value Taken Time   /75 03/28/23 0846   Temp     Pulse 85 03/28/23 0849   Resp 16 03/28/23 0849   SpO2 100 % 03/28/23 0849   Vitals shown include unvalidated device data.    Electronically Signed By: SHELL Blood CRNA  March 28, 2023  8:50 AM

## 2023-03-28 NOTE — ANESTHESIA PREPROCEDURE EVALUATION
"Anesthesia Pre-Procedure Evaluation    Patient: Julio Grier   MRN: [id number] : 1934        Procedure : Procedure(s):  BRONCHOSCOPY FLEXIBLE - REQUIRES NEGATIVE AIRFLOW ROOM in PACU 28          Past Medical History:   Diagnosis Date     Diabetes mellitus (H)      Unspecified cerebral artery occlusion with cerebral infarction       Past Surgical History:   Procedure Laterality Date     BACK SURGERY       HEMIARTHROPLASTY HIP Left 3/21/2023    Procedure: Left Hip Hemiarthroplasty;  Surgeon: Jeffrey Paulino MD;  Location: [facility] OR     ORTHOPEDIC SURGERY        Allergies   Allergen Reactions     Aspirin Unknown     Irbesartan Unknown     Penicillins Other (See Comments)     Pt has some sort of reaction at dentist office after PEN supposedly. Pt cannot verify nor can family. Pt has no reaction to \"stephane\" products as was previously indicated. This was verified by familly. All this reviewed with opthalmalogist and anestheiologist on 2018      Social History     Tobacco Use     Smoking status: Never     Smokeless tobacco: Never   Substance Use Topics     Alcohol use: Not Currently      Wt Readings from Last 1 Encounters:   23 40.8 kg (89 lb 15.2 oz)        Anesthesia Evaluation            ROS/MED HX  ENT/Pulmonary:       Neurologic:     (+) CVA, with deficits, - L Hemiparesis and Hemiplegia.     Cardiovascular:     (+) Dyslipidemia -----Taking blood thinners     METS/Exercise Tolerance: 1 - Eating, dressing    Hematologic:     (+) anemia,     Musculoskeletal: Comment: L hip hemiarthroplasty on 23      GI/Hepatic:       Renal/Genitourinary:       Endo:     (+) type II DM,     Psychiatric/Substance Use:       Infectious Disease: Comment: H/o UTIs      Malignancy:       Other:            Physical Exam    Airway        Mallampati: II   TM distance: > 3 FB   Neck ROM: limited     Respiratory Devices and Support         Dental     Comment: States lower right first molar loose through   "   (+) Minor Abnormalities - some fillings, tiny chips      Cardiovascular   cardiovascular exam normal          Pulmonary   pulmonary exam normal                OUTSIDE LABS:  CBC:   Lab Results   Component Value Date    WBC 8.9 03/27/2023    WBC 10.2 03/26/2023    HGB 9.2 (L) 03/27/2023    HGB 9.4 (L) 03/26/2023    HCT 27.5 (L) 03/27/2023    HCT 28.4 (L) 03/26/2023     03/27/2023     03/26/2023     BMP:   Lab Results   Component Value Date     03/27/2023     03/26/2023    POTASSIUM 4.8 03/27/2023    POTASSIUM 4.7 03/26/2023    CHLORIDE 103 03/27/2023    CHLORIDE 104 03/26/2023    CO2 28 03/27/2023    CO2 26 03/26/2023    BUN 19.3 03/27/2023    BUN 15.9 03/26/2023    CR 0.85 03/27/2023    CR 0.77 03/26/2023     (H) 03/28/2023     (H) 03/27/2023     COAGS:   Lab Results   Component Value Date    PTT 28 01/27/2011    INR 1.06 03/20/2023     POC:   Lab Results   Component Value Date     (H) 02/08/2011     HEPATIC:   Lab Results   Component Value Date    ALBUMIN 3.5 03/20/2023    PROTTOTAL 6.0 (L) 03/20/2023    ALT 7 (L) 03/20/2023    AST 19 03/20/2023    ALKPHOS 43 03/20/2023    BILITOTAL 0.4 03/20/2023     OTHER:   Lab Results   Component Value Date    PH 7.43 03/22/2023    LACT 1.5 03/22/2023    A1C 8.8 (H) 01/27/2011    MARY 9.5 03/27/2023    PHOS 3.5 03/25/2023    MAG 1.6 (L) 03/25/2023    LIPASE 12 (L) 03/20/2023    CRP 54.3 (H) 03/16/2010       Anesthesia Plan    ASA Status:  3   NPO Status:  NPO Appropriate    Anesthesia Type: General.     - Airway: ETT   Induction: Intravenous.   Maintenance: TIVA.        Consents    Anesthesia Plan(s) and associated risks, benefits, and realistic alternatives discussed. Questions answered and patient/representative(s) expressed understanding.    - Discussed:     - Discussed with:  Patient,       - Extended Intubation/Ventilatory Support Discussed: No.      - Patient is DNR/DNI Status: No    Use of blood products  discussed: No .     Postoperative Care    Pain management: Multi-modal analgesia, IV analgesics.   PONV prophylaxis: Background Propofol Infusion     Comments:                Joseph Che MD

## 2023-03-28 NOTE — OR NURSING
Patient waking up from anesthesia. I used the  for about 10 minutes to see how the patient was doing. No complaints except for the need to cough.

## 2023-03-28 NOTE — OR NURSING
"PACU to Inpatient Nursing Handoff    Patient Julio Grier is a 88 year old female who speaks Faroese.   Procedure Procedure(s):  BRONCHOSCOPY FLEXIBLE - REQUIRES NEGATIVE AIRFLOW ROOM in PACU 28   Surgeon(s) Primary: Harpreet Bower MD     Allergies   Allergen Reactions     Aspirin Unknown     Irbesartan Unknown     Penicillins Other (See Comments)     Pt has some sort of reaction at dentist office after PEN supposedly. Pt cannot verify nor can family. Pt has no reaction to \"stephane\" products as was previously indicated. This was verified by familly. All this reviewed with opthalmalogist and anestheiologist on 5/17/2018       Isolation  Airborne     Past Medical History   has a past medical history of Diabetes mellitus (H) and Unspecified cerebral artery occlusion with cerebral infarction.    Anesthesia MAC   Dermatome Level     Preop Meds Not applicable   Nerve block Not applicable   Intraop Meds dexamethasone (Decadron)  ondansetron (Zofran): last given at 0829   Local Meds No   Antibiotics Not applicable     Pain Patient Currently in Pain: denies   PACU meds  Not applicable   PCA / epidural No   Capnography Respiratory Monitoring (EtCO2): 25 mmHg   Telemetry ECG Rhythm: Normal sinus rhythm   Inpatient Telemetry Monitor Ordered? No        Labs Glucose Lab Results   Component Value Date     03/28/2023     06/03/2022     01/01/2013       Hgb Lab Results   Component Value Date    HGB 9.2 03/27/2023    HGB 13.1 01/01/2013       INR Lab Results   Component Value Date    INR 1.06 03/20/2023    INR 0.95 08/26/2012      PACU Imaging Not applicable     Wound/Incision Wound Sacrum Pressure injury community acquired Stage 1 (Active)   Wound Bed Dusky;Black 03/26/23 1700   Barbie-wound Assessment Black;Dusky 03/22/23 0800   Drainage Amount None 03/22/23 0800   Wound Care/Cleansing Other (Comment) 03/25/23 0947   Dressing Foam 03/27/23 2000   Dressing Status New dressing;Clean, dry, intact 03/26/23 1700 "   Number of days: 7       Incision/Surgical Site 03/21/23 Left Hip (Active)   Incision Assessment UTV 03/28/23 0856   Barbie-Incision Assessment UTV 03/24/23 0100   Closure GEOFF 03/24/23 0100   Incision Drainage Amount None 03/24/23 0100   Drainage Description Other (Comment) 03/22/23 1600   Dressing Intervention Dressing marked;Dried drainage 03/27/23 1221   Number of days: 7      CMS        Equipment abductor pillow   Other LDA       IV Access Peripheral IV 03/21/23 Left Lower forearm (Active)   Site Assessment WDL 03/28/23 0850   Line Status Infusing 03/28/23 0850   Phlebitis Scale 0-->no symptoms 03/28/23 0850   Infiltration Scale 0 03/27/23 2000   Number of days: 7      Blood Products Not applicable EBL 0 mL   Intake/Output Date 03/28/23 0700 - 03/29/23 0659   Shift 1828-4627 5177-9928 8353-8552 24 Hour Total   INTAKE   I.V. 175   175   Shift Total(mL/kg) 175(4.29)   175(4.29)   OUTPUT   Shift Total(mL/kg)       Weight (kg) 40.8 40.8 40.8 40.8      Drains / Sy External Urinary Catheter (Active)   Skin no redness;no breakdown 03/27/23 2000   Collection Container Standard 03/27/23 2000   Securement Method Other (Comment) 03/27/23 2000   External Catheter changed Done 03/28/23 0600   Number of days: 1      Time of void PreOp Void Prior to Procedure: 0500 (purewick) (03/28/23 0727)    PostOp Voided (mL): 300 mL (sy) (03/24/23 1100)  Urine Occurrence: 1 (03/28/23 0600)  Urinary Incontinence: Yes (03/28/23 0600)    Diapered? No   Bladder Scan      mL (03/25/23 0943)  tolerating sips and water     Vitals    B/P: (!) 160/70  T: 96.8  F (36  C)    Temp src: Axillary  P:  Pulse: 83 (03/28/23 0915)          R: 15  O2:  SpO2: 98 %    O2 Device: Oxymask (03/28/23 0900)    Oxygen Delivery: 10 LPM (03/28/23 0900)         Family/support present no one here   Patient belongings     Patient transported on bed   DC meds/scripts (obs/outpt) Not applicable   Inpatient Pain Meds Released? Yes       Special  needs/considerations Japanese    Tasks needing completion None       Breann Henao RN  [facility] [id number]

## 2023-03-28 NOTE — PROGRESS NOTES
2:22pm - Received call from Jeanne care coordinator w/ [employer], looking to speak w/ the inpatient care coordinator covering this pt.  Provided Jeanne w/ phone number for [facility], where this pt is currently roomed.        Doyle Celaya, RNCC

## 2023-03-29 LAB
ANION GAP SERPL CALCULATED.3IONS-SCNC: 9 MMOL/L (ref 7–15)
BACTERIA BLD CULT: NO GROWTH
BACTERIA BLD CULT: NO GROWTH
BUN SERPL-MCNC: 28.9 MG/DL (ref 8–23)
CALCIUM SERPL-MCNC: 9.8 MG/DL (ref 8.8–10.2)
CHLORIDE SERPL-SCNC: 99 MMOL/L (ref 98–107)
CMV DNA SPEC NAA+PROBE-ACNC: NOT DETECTED IU/ML
CREAT SERPL-MCNC: 0.93 MG/DL (ref 0.51–0.95)
DEPRECATED HCO3 PLAS-SCNC: 27 MMOL/L (ref 22–29)
ERYTHROCYTE [DISTWIDTH] IN BLOOD BY AUTOMATED COUNT: 14.6 % (ref 10–15)
GFR SERPL CREATININE-BSD FRML MDRD: 59 ML/MIN/1.73M2
GLUCOSE BLDC GLUCOMTR-MCNC: 125 MG/DL (ref 70–99)
GLUCOSE BLDC GLUCOMTR-MCNC: 158 MG/DL (ref 70–99)
GLUCOSE BLDC GLUCOMTR-MCNC: 176 MG/DL (ref 70–99)
GLUCOSE BLDC GLUCOMTR-MCNC: 247 MG/DL (ref 70–99)
GLUCOSE BLDC GLUCOMTR-MCNC: 320 MG/DL (ref 70–99)
GLUCOSE BLDC GLUCOMTR-MCNC: 392 MG/DL (ref 70–99)
GLUCOSE SERPL-MCNC: 122 MG/DL (ref 70–99)
HCT VFR BLD AUTO: 24.5 % (ref 35–47)
HGB BLD-MCNC: 8.1 G/DL (ref 11.7–15.7)
MCH RBC QN AUTO: 33.1 PG (ref 26.5–33)
MCHC RBC AUTO-ENTMCNC: 33.1 G/DL (ref 31.5–36.5)
MCV RBC AUTO: 100 FL (ref 78–100)
PATH REPORT.COMMENTS IMP SPEC: NORMAL
PATH REPORT.COMMENTS IMP SPEC: NORMAL
PATH REPORT.FINAL DX SPEC: NORMAL
PATH REPORT.GROSS SPEC: NORMAL
PATH REPORT.MICROSCOPIC SPEC OTHER STN: NORMAL
PATH REPORT.RELEVANT HX SPEC: NORMAL
PLATELET # BLD AUTO: 310 10E3/UL (ref 150–450)
POTASSIUM SERPL-SCNC: 4.6 MMOL/L (ref 3.4–5.3)
RBC # BLD AUTO: 2.45 10E6/UL (ref 3.8–5.2)
SODIUM SERPL-SCNC: 135 MMOL/L (ref 136–145)
WBC # BLD AUTO: 9.8 10E3/UL (ref 4–11)

## 2023-03-29 PROCEDURE — 250N000013 HC RX MED GY IP 250 OP 250 PS 637: Performed by: INTERNAL MEDICINE

## 2023-03-29 PROCEDURE — 36415 COLL VENOUS BLD VENIPUNCTURE: CPT

## 2023-03-29 PROCEDURE — 250N000013 HC RX MED GY IP 250 OP 250 PS 637: Performed by: PHYSICIAN ASSISTANT

## 2023-03-29 PROCEDURE — 85014 HEMATOCRIT: CPT

## 2023-03-29 PROCEDURE — 99232 SBSQ HOSP IP/OBS MODERATE 35: CPT | Performed by: INTERNAL MEDICINE

## 2023-03-29 PROCEDURE — 80048 BASIC METABOLIC PNL TOTAL CA: CPT

## 2023-03-29 PROCEDURE — 250N000013 HC RX MED GY IP 250 OP 250 PS 637: Performed by: SURGERY

## 2023-03-29 PROCEDURE — 250N000013 HC RX MED GY IP 250 OP 250 PS 637: Performed by: NURSE PRACTITIONER

## 2023-03-29 PROCEDURE — 250N000011 HC RX IP 250 OP 636: Performed by: PHYSICIAN ASSISTANT

## 2023-03-29 PROCEDURE — 120N000002 HC R&B MED SURG/OB UMMC

## 2023-03-29 RX ORDER — DIPHENHYDRAMINE HYDROCHLORIDE, ZINC ACETATE 2; .1 G/100G; G/100G
CREAM TOPICAL 3 TIMES DAILY PRN
Status: DISCONTINUED | OUTPATIENT
Start: 2023-03-29 | End: 2023-03-31 | Stop reason: HOSPADM

## 2023-03-29 RX ADMIN — DIPHENHYDRAMINE HYDROCHLORIDE, ZINC ACETATE: 2; .1 CREAM TOPICAL at 22:32

## 2023-03-29 RX ADMIN — DIPHENHYDRAMINE HYDROCHLORIDE, ZINC ACETATE: 2; .1 CREAM TOPICAL at 06:10

## 2023-03-29 RX ADMIN — FEXOFENADINE HYDROCHLORIDE 90 MG: 30 SUSPENSION ORAL at 19:42

## 2023-03-29 RX ADMIN — METFORMIN HYDROCHLORIDE 500 MG: 500 TABLET ORAL at 08:51

## 2023-03-29 RX ADMIN — ATORVASTATIN CALCIUM 20 MG: 20 TABLET, FILM COATED ORAL at 08:51

## 2023-03-29 RX ADMIN — MAGNESIUM OXIDE TAB 400 MG (241.3 MG ELEMENTAL MG) 400 MG: 400 (241.3 MG) TAB at 19:42

## 2023-03-29 RX ADMIN — METFORMIN HYDROCHLORIDE 500 MG: 500 TABLET ORAL at 17:12

## 2023-03-29 RX ADMIN — MIRTAZAPINE 7.5 MG: 7.5 TABLET, FILM COATED ORAL at 22:30

## 2023-03-29 RX ADMIN — ENOXAPARIN SODIUM 30 MG: 30 INJECTION SUBCUTANEOUS at 15:47

## 2023-03-29 RX ADMIN — MAGNESIUM OXIDE TAB 400 MG (241.3 MG ELEMENTAL MG) 400 MG: 400 (241.3 MG) TAB at 08:51

## 2023-03-29 RX ADMIN — ACETAMINOPHEN 975 MG: 325 TABLET, FILM COATED ORAL at 08:48

## 2023-03-29 RX ADMIN — ACETAMINOPHEN 975 MG: 325 TABLET, FILM COATED ORAL at 15:47

## 2023-03-29 RX ADMIN — TRIAMCINOLONE ACETONIDE: 1 CREAM TOPICAL at 08:52

## 2023-03-29 RX ADMIN — AMLODIPINE BESYLATE 2.5 MG: 2.5 TABLET ORAL at 08:51

## 2023-03-29 RX ADMIN — TRIAMCINOLONE ACETONIDE: 1 CREAM TOPICAL at 19:51

## 2023-03-29 RX ADMIN — SENNOSIDES AND DOCUSATE SODIUM 1 TABLET: 50; 8.6 TABLET ORAL at 08:47

## 2023-03-29 RX ADMIN — FEXOFENADINE HYDROCHLORIDE 90 MG: 30 SUSPENSION ORAL at 09:11

## 2023-03-29 ASSESSMENT — ACTIVITIES OF DAILY LIVING (ADL)
ADLS_ACUITY_SCORE: 78

## 2023-03-29 NOTE — PROGRESS NOTES
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 Suspicion for urosepsis given hypotension and tachycardia and UA positive for nitrites, leukocyte esterase and large amounts of WBCs. Patient transfer to [facility] for Left Hip Arthroplasty. Surgical procedure was uncomplicated and patient is in stable condition.      #Hx of CVA c/b hemiparesis and hemiplegia  - Stable neuro status  - Functional status improving to the point that family feels they can manage her at home     #Right upper lobe spiculated mass  Seen  on the CT scan on 3/20/2023.  Pulmonology consulted and case discussed.  Pulmonary has advised to rule out tuberculosis.  - Airborne isolation placed  - RT has not been able to induce sputum as noted above.  - Inpatient bronch plan for 3/28, labs pending   - Still pending pulmonology follow-up recommendations    #Stress hyperglycemia superimposed on   #Diabetes Mellitus type II   - Blood glucoses have been trending down particular in the morning. - Currently receiving Lantus 30 units twice daily plus metformin, not yet back on glipizide  - Reduce Lantus to 20 units twice daily, continue metformin, continue to hold glipizide  - We will need to reassess at time of discharge as patient is currently on considerably less medication than baseline    #Left femoral neck fracture due to mechanical fall   #S/p LEFT hip hemiarthroplasty on 3/21  Regional anesthesia placed left iliac fascia block at [place].  Head CT negative for trauma x 2. trauma eval completed prior to transfer to [facility]   - Fall precautions  - PT/OT.   "Appreciate PT OT recommendations  - Posterior hip precautions. WBAT. Up with assist     #Positive blood culture from 3/20/2023  - Blood culture from Paenibacillus species.    - F/u BC neg, likely contaminant      #Acute blood loss anemia due to fracture and  surgical blood loss   - Hgb 8.7, stable  - Plan monitor      #HTN   #HLD  - Stable on resumed amlodipine.  Lipitor     #Hyponatremia (resolved)  #Hypomagnesia, mild  - Oral magnesium replacement     #Hx of urinary retention  #Sepsis ruled out   #History of recurrent UTI  - Positive urine culture on admission likely representing contaminant  - Status post 5-day course of Rocephin  - No further treatment indicated.  - Family to consider outpatient urology evaluation  - Bowel regimen as patient has been constipated since surgery       Diet: Snacks/Supplements Adult: Ensure Enlive; With Meals  Soft & Bite Sized Diet (level 6) Slightly Thick (level 1)    DVT Prophylaxis: Enoxaparin (Lovenox) SQ  Modi Catheter: Not present  Lines: None     Cardiac Monitoring: None  Code Status: Full Code      Clinically Significant Risk Factors        # Hyperkalemia: Highest K = 5.6 mmol/L in last 2 days, will monitor as appropriate                 # Cachexia: Estimated body mass index is 16.45 kg/m  as calculated from the following:    Height as of this encounter: 1.575 m (5' 2.01\").    Weight as of this encounter: 40.8 kg (89 lb 15.2 oz).   # Severe Malnutrition: based on nutrition assessment        Disposition Plan        Discharge pending complete TB work-up.    Favian Lara DO, S  Hospitalist Service, GOLD TEAM 19  [facility]  Securely message with [email or web address] (more info)  Text page via [employer] Paging/Directory   See signed in provider for up to date coverage information  ______________________________________________________________________    Interval History   Interview limited by language barrier.  Attempted to use  " application.  Patient is very soft-spoken and it was difficult to translate.  Patient is appreciative; nursing staff reports no acute concerns.  Pulmonology managing TB work-up.    Physical Exam   Vital Signs: Temp: (!) 96.2  F (35.7  C) Temp src: Oral BP: 138/62 Pulse: 73   Resp: 16 SpO2: 97 % O2 Device: None (Room air)    Weight: 89 lbs 15.16 oz    GENERAL: Patient laying supine being changed; alert; awake; no acute distress.  HEENT: Normocephalic; atraumatic; PERRLA; MMM.  CV: RRR; normal S1, S2; no rubs, murmurs, or gallops.  RESP: Lung fields clear to aucultation B/L; no wheezing or crepitations.  GI: Abdomen is soft, nontender, nondistended; no organomegaly; normal bowel sounds.  : Deferred genital examination.   MSK: No clubbing, cyanosis, or edema.  DERM: Skin is intact; no rash, lesions, or skin breakdown.  NEURO: No focal deficits appreciated; strength & sensorium are grossly intact.  PSYCH: No active hallucinations; affect, insight appear within normal limits.    Medical Decision Making       45 MINUTES SPENT BY ME on the date of service doing chart review, history, exam, documentation & further activities per the note.      Data     I have personally reviewed the following data over the past 24 hrs:    9.8  \   8.1 (L)   / 310     135 (L) 99 28.9 (H) /  158 (H)   4.6 27 0.93 \       Imaging results reviewed over the past 24 hrs:   No results found for this or any previous visit (from the past 24 hour(s)).

## 2023-03-29 NOTE — PLAN OF CARE
Pt. Alert, disoriented to time and situation. Polish speaking,  utilized. VSS. Afebrile. Lung sounds CTA, diminished in R lower lobe. Maintaining sats on RA. Bowel sounds active, LBM 3/29, incontinent of bowel and bladder. Purewick & brief in place overnight. CMS and neuro's are intact. Denies numbness and tingling in all extremities. Denies nausea, shortness of breath, and chest pain. Denied pain this shift. Reported itching not relieved by hydrocortisone cream, order added for Benadryl cream. Tolerating soft & bite sized diet, level 6 slightly thick liquids.  at HS and 176 overnight. L hip incisional dressing is intact w/ no change to marked drainage. Sacral mepilex in place. Pt not OOB, bed alarm on. PIV patent and SL. Bilateral heels are elevated off the bed. Call light is within reach, calling appropriately, pt able to make needs known and is resting comfortably between cares. Will continue to monitor.

## 2023-03-29 NOTE — PLAN OF CARE
VS: VSS, pt denied CP or SOB.   O2: Room air sat. > 90%.   Output: Voiding adequate amount incontinent of bowel and bladder. purewick in place   Last BM: 03/29/23 passing gas.    Activity: Repositioned in bed every 2 hours.   Skin: Intact except surgical incision and some redness on coccyx GEOFF protective dressing on..    Pain: Denied pain this morning.    Neuro: CMS and neuro intact to baseline.    Dressing: Slight dry drainage noted.    Diet: NPO after midnight for procedure tomorrow morning.    LDA: MICHAEL VASQUEZ.    Equipment: Walker, and personal belongings.    Plan: TBD.    Additional Info:  bed alarm on for safety, call light in reach

## 2023-03-29 NOTE — PROVIDER NOTIFICATION
Text page sent to cross-cover, pt states she is itchy, hydrocortisone cream already used. Order added for prn Benadryl cream.

## 2023-03-30 LAB
ANION GAP SERPL CALCULATED.3IONS-SCNC: 11 MMOL/L (ref 7–15)
BACTERIA BRONCH: NO GROWTH
BUN SERPL-MCNC: 40 MG/DL (ref 8–23)
CALCIUM SERPL-MCNC: 9.7 MG/DL (ref 8.8–10.2)
CHLORIDE SERPL-SCNC: 98 MMOL/L (ref 98–107)
CREAT SERPL-MCNC: 1.11 MG/DL (ref 0.51–0.95)
DEPRECATED HCO3 PLAS-SCNC: 22 MMOL/L (ref 22–29)
ERYTHROCYTE [DISTWIDTH] IN BLOOD BY AUTOMATED COUNT: 14.6 % (ref 10–15)
GFR SERPL CREATININE-BSD FRML MDRD: 48 ML/MIN/1.73M2
GLUCOSE BLDC GLUCOMTR-MCNC: 255 MG/DL (ref 70–99)
GLUCOSE BLDC GLUCOMTR-MCNC: 298 MG/DL (ref 70–99)
GLUCOSE BLDC GLUCOMTR-MCNC: 303 MG/DL (ref 70–99)
GLUCOSE BLDC GLUCOMTR-MCNC: 340 MG/DL (ref 70–99)
GLUCOSE BLDC GLUCOMTR-MCNC: 351 MG/DL (ref 70–99)
GLUCOSE BLDC GLUCOMTR-MCNC: 355 MG/DL (ref 70–99)
GLUCOSE BLDC GLUCOMTR-MCNC: 413 MG/DL (ref 70–99)
GLUCOSE SERPL-MCNC: 299 MG/DL (ref 70–99)
HCT VFR BLD AUTO: 23.8 % (ref 35–47)
HGB BLD-MCNC: 8.1 G/DL (ref 11.7–15.7)
MCH RBC QN AUTO: 33.6 PG (ref 26.5–33)
MCHC RBC AUTO-ENTMCNC: 34 G/DL (ref 31.5–36.5)
MCV RBC AUTO: 99 FL (ref 78–100)
PLATELET # BLD AUTO: 257 10E3/UL (ref 150–450)
POTASSIUM SERPL-SCNC: 5 MMOL/L (ref 3.4–5.3)
RBC # BLD AUTO: 2.41 10E6/UL (ref 3.8–5.2)
SODIUM SERPL-SCNC: 131 MMOL/L (ref 136–145)
WBC # BLD AUTO: 11 10E3/UL (ref 4–11)

## 2023-03-30 PROCEDURE — 85014 HEMATOCRIT: CPT

## 2023-03-30 PROCEDURE — 250N000013 HC RX MED GY IP 250 OP 250 PS 637: Performed by: INTERNAL MEDICINE

## 2023-03-30 PROCEDURE — G0463 HOSPITAL OUTPT CLINIC VISIT: HCPCS

## 2023-03-30 PROCEDURE — 250N000013 HC RX MED GY IP 250 OP 250 PS 637: Performed by: PHYSICIAN ASSISTANT

## 2023-03-30 PROCEDURE — 250N000013 HC RX MED GY IP 250 OP 250 PS 637: Performed by: SURGERY

## 2023-03-30 PROCEDURE — 250N000013 HC RX MED GY IP 250 OP 250 PS 637: Performed by: NURSE PRACTITIONER

## 2023-03-30 PROCEDURE — 80048 BASIC METABOLIC PNL TOTAL CA: CPT

## 2023-03-30 PROCEDURE — 120N000002 HC R&B MED SURG/OB UMMC

## 2023-03-30 PROCEDURE — 250N000011 HC RX IP 250 OP 636: Performed by: PHYSICIAN ASSISTANT

## 2023-03-30 PROCEDURE — 99232 SBSQ HOSP IP/OBS MODERATE 35: CPT | Performed by: INTERNAL MEDICINE

## 2023-03-30 PROCEDURE — 36416 COLLJ CAPILLARY BLOOD SPEC: CPT

## 2023-03-30 PROCEDURE — 99232 SBSQ HOSP IP/OBS MODERATE 35: CPT | Mod: 24 | Performed by: STUDENT IN AN ORGANIZED HEALTH CARE EDUCATION/TRAINING PROGRAM

## 2023-03-30 RX ORDER — NICOTINE POLACRILEX 4 MG
15-30 LOZENGE BUCCAL
Status: DISCONTINUED | OUTPATIENT
Start: 2023-03-30 | End: 2023-03-31

## 2023-03-30 RX ORDER — CLOPIDOGREL BISULFATE 75 MG/1
75 TABLET ORAL DAILY
Status: DISCONTINUED | OUTPATIENT
Start: 2023-03-30 | End: 2023-03-31 | Stop reason: HOSPADM

## 2023-03-30 RX ORDER — DEXTROSE MONOHYDRATE 25 G/50ML
25-50 INJECTION, SOLUTION INTRAVENOUS
Status: DISCONTINUED | OUTPATIENT
Start: 2023-03-30 | End: 2023-03-31

## 2023-03-30 RX ADMIN — ACETAMINOPHEN 975 MG: 325 TABLET, FILM COATED ORAL at 08:27

## 2023-03-30 RX ADMIN — ATORVASTATIN CALCIUM 20 MG: 20 TABLET, FILM COATED ORAL at 08:26

## 2023-03-30 RX ADMIN — FEXOFENADINE HYDROCHLORIDE 90 MG: 30 SUSPENSION ORAL at 21:42

## 2023-03-30 RX ADMIN — TRIAMCINOLONE ACETONIDE: 1 CREAM TOPICAL at 08:32

## 2023-03-30 RX ADMIN — ACETAMINOPHEN 975 MG: 325 TABLET, FILM COATED ORAL at 15:15

## 2023-03-30 RX ADMIN — MIRTAZAPINE 7.5 MG: 7.5 TABLET, FILM COATED ORAL at 21:34

## 2023-03-30 RX ADMIN — ENOXAPARIN SODIUM 30 MG: 30 INJECTION SUBCUTANEOUS at 15:15

## 2023-03-30 RX ADMIN — AMLODIPINE BESYLATE 2.5 MG: 2.5 TABLET ORAL at 08:27

## 2023-03-30 RX ADMIN — FEXOFENADINE HYDROCHLORIDE 90 MG: 30 SUSPENSION ORAL at 08:26

## 2023-03-30 RX ADMIN — TRIAMCINOLONE ACETONIDE: 1 CREAM TOPICAL at 21:35

## 2023-03-30 RX ADMIN — MAGNESIUM OXIDE TAB 400 MG (241.3 MG ELEMENTAL MG) 400 MG: 400 (241.3 MG) TAB at 08:27

## 2023-03-30 RX ADMIN — CLOPIDOGREL BISULFATE 75 MG: 75 TABLET ORAL at 15:14

## 2023-03-30 RX ADMIN — SENNOSIDES AND DOCUSATE SODIUM 1 TABLET: 50; 8.6 TABLET ORAL at 08:27

## 2023-03-30 RX ADMIN — SENNOSIDES AND DOCUSATE SODIUM 1 TABLET: 50; 8.6 TABLET ORAL at 21:34

## 2023-03-30 RX ADMIN — METFORMIN HYDROCHLORIDE 500 MG: 500 TABLET ORAL at 08:26

## 2023-03-30 RX ADMIN — MAGNESIUM OXIDE TAB 400 MG (241.3 MG ELEMENTAL MG) 400 MG: 400 (241.3 MG) TAB at 21:34

## 2023-03-30 ASSESSMENT — ACTIVITIES OF DAILY LIVING (ADL)
ADLS_ACUITY_SCORE: 78

## 2023-03-30 NOTE — CONSULTS
Consult from ID Dr Perdomo:I think that she has not ruled out TB yet , but she could be discharged to home. The family should be advised that we are waiting for the TB tests to come back though and that we cannot completely rule out that possibility yet (just so they are aware of the possible risk). I would still want to see the results of the MTB Complex and Resistance lab [[id number]] and the sputum and BAL cultures for AFB to say that she is ruled out. I do think that either TB or another lung infection or malignancy are still in the differential diagnosis. If she stays in the hospital or comes back to clinic I would keep her in rule out TB isolation. I would think she might need a biopsy to rule out malignancy but it might be a high risk procedure. They want to work that up as an outpatient though it sounds like by having her seen in the lung nodule clinic.   Thanks,  Dr Fidelia Perdomo   Please let me know if further questions or follow up.  Norris CAMARGO   awake/alert

## 2023-03-30 NOTE — PROGRESS NOTES
Brief Medicine Cross Cover Note       #Hyperglycemia   Paged by nursing regarding hyperglycemia.  Per chart review, patient is on Lantus 30 units twice daily at home.  In perioperative setting she was restarted on 20 units twice daily today.  Per conversation with nursing, metformin held this evening due to mild МАРИЯ with GFR down to 48.  Approximately 1 hour after receiving 20 units of Lantus. blood sugar remains elevated at 355.  - Patient given additional 5 units of NovoLog at this time.  - Increase back to home dose Lantus of 30 twice daily (I ordered an additional 10 units now for evening total of 30). Dose given at 1844  - Blood glucose recheck at 2138 was further elevated at 413    Plan  -Case discussed with nursing, who is given 5 units of NovoLog per bedtime sliding scale  - I ordered an additional 5 units of NovoLog (10 total)  - Increased sliding scale to high intensity  - Advised nursing to recheck blood sugar in 1 hour and to update oncoming provider     Mik Gibbons PA-C  Internal Medicine [facility] Hospitalist  [facility]  Pager [phone]

## 2023-03-30 NOTE — PLAN OF CARE
Pt. Alert, disoriented to time and situation. Luxembourgish speaking,  utilized. VSS. Afebrile. Lung sounds CTA, diminished in R lower lobe. Maintaining sats on RA. Bowel sounds active, LBM 3/29, incontinent of bowel and bladder. Purewick & brief in place overnight. CMS and neuro's are intact. Denies numbness and tingling in all extremities. Denies nausea, shortness of breath, and chest pain. Denied pain this shift. Reported itching, Benadryl cream used. Tolerating soft & bite sized diet, level 6 slightly thick liquids.  at HS and 340 overnight. L hip incisional dressing is intact w/ no change to marked drainage, abductor wedge in place. Sacral mepilex in place. Pt not OOB, bed alarm on. PIV patent and SL. Bilateral heels are elevated off the bed. Call light is within reach, calling appropriately, pt able to make needs known and is resting comfortably between cares. Bed alarm on. Will continue to monitor.

## 2023-03-30 NOTE — PLAN OF CARE
"Goal Outcome Evaluation:      Plan of Care Reviewed With: patient    Overall Patient Progress: no changeOverall Patient Progress: no change       VS: VSS.  Vital signs:  Temp: (!) 96  F (35.6  C) Temp src: Oral BP: 132/60 Pulse: 70   Resp: 15 SpO2: 94 % O2 Device: None (Room air) Oxygen Delivery: 10 LPM Height: 157.5 cm (5' 2.01\") Weight: 40.8 kg (89 lb 15.2 oz)  Estimated body mass index is 16.45 kg/m  as calculated from the following:    Height as of this encounter: 1.575 m (5' 2.01\").    Weight as of this encounter: 40.8 kg (89 lb 15.2 oz).     O2: >94% on RA, maintaining sats.  Lung sounds clear except diminished in R lower lobe.   Output: Pt incontinent- purewick and brief in place   Last BM: 3/30 - pt incontinent with BM   Activity: Reposition in bed with assist of 2 with the help of family.  Assist of 2    Skin: Intact except surgical incision and WOC nurse staged wound on coccyx as Stage 2 pressure injury.   Clean wound and dressing change this shift.  Coccyx dressing/wound change due every 3 days- next due 4/2/23.   Pain: Pt denied any pain this shift   CMS: CMS and neuro intact to baseline.   Dressing: Surgical incision- clean dry and intact.  Coccyx- clean dry and intact, placed this shift.   Diet: Soft and bite sized diet level 6.  Pt takes oral medications crushed in apple sauce.  BG at meals, lunch: 298 given 4 units, dinner 303.  MD re-started Lantus 2 X daily, per MD requested 8pm lantus to be given with dinner.   LDA: PIV- L lower arm   Equipment: Walker, IV pole, personal belongings.   Plan: Waiting for TB result, plan to discharge home 4/1.   Additional Info: Ordered pulsate mattress d/t wound on coccyx.  Pt Greenlandic speaking, Ipad at bedside.         "

## 2023-03-30 NOTE — CONSULTS
[facility] Nurse Inpatient Assessment     Consulted for: sacrum    Patient History (according to provider note(s):      88 year old Georgian speaking female with a medical history significant for 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 Suspicion for urosepsis given hypotension and tachycardia and UA positive for nitrites, leukocyte esterase and large amounts of WBCs. Patient transfer to [facility] for Left Hip Arthroplasty. Surgical procedure was uncomplicated and patient is in stable condition.     Areas Assessed:      Areas visualized during today's visit: Focused:    Pressure Injury Location: sacrum    3/30  Last photo: 3/30/23  Wound type: Pressure Injury     Pressure Injury Stage: 2, hospital acquired (re-opened healed wound)   Wound history/plan of care: Per family pt had pressure injury that healed. On admission noted to have a Stage 1 pressure injury that evolved into stage 2. Very little fat pad over sacrum and coccyx. Per nurse and family pt often wiggles onto back when turned and repositioned. Sacral mepilex in use as well as regular turn and repositioning. Also noted chronic skin changes from friction. At home family uses barrier cream to area as well as turning and repositioning. Encouraged them to continue same plan at home.   Wound base: 100 % dermis     Palpation of the wound bed: normal      Drainage: scant     Description of drainage: serosanguinous     Measurements (length x width x depth, in cm) 0.5 x 1 x 0.1 cm  Periwound skin: Intact      Color: pink      Temperature: normal   Odor: none  Pain: no grimacing or signs of discomfort, none  Pain intervention prior to dressing change: slow and gentle cares   Treatment goal: Heal  and Protection  STATUS: initial assessment  Supplies ordered: discussed  "with RN and family      Treatment Plan:     Sacrum wound(s): Every 3 days     Cleanse the area with NS and pat dry.    Apply No sting film barrier to periwound skin.    Cover wound with Sacral Mepilex (#[id number])    Change dressing Q 3 days.    Turn and reposition Q 2hrs side to side only. Please document if pt declines or \"wiggles\" onto back again.    Ensure pt has Hakan-cushion while sitting up in the chair.    FYI- If pt has constant incontinent loose stools needing dressing changes Q shift please discontinue the Mepilex dressing and apply criticaid barrier paste BID and PRN.       Orders: Written    RECOMMEND PRIMARY TEAM ORDER: None, at this time  Education provided: importance of repositioning, plan of care and wound progress  Discussed plan of care with: Patient, Family and Nurse  [facility] nurse follow-up plan: weekly  Notify WOC if wound(s) deteriorate.  Nursing to notify the Provider(s) and re-consult the WO Nurse if new skin concern.    DATA:     Current support surface: Standard  Standard gel/foam mattress (IsoFlex, Atmos air, etc)  Containment of urine/stool: Incontinence Protocol and Purewick external catheter   BMI: Body mass index is 16.45 kg/m .   Active diet order: Orders Placed This Encounter      Soft & Bite Sized Diet (level 6) Slightly Thick (level 1)     Output: I/O last 3 completed shifts:  In: -   Out: 750 [Urine:750]     Labs: Recent Labs   Lab 03/30/23  0600   HGB 8.1*   WBC 11.0     Pressure injury risk assessment:   Sensory Perception: 3-->slightly limited  Moisture: 3-->occasionally moist  Activity: 2-->chairfast  Mobility: 3-->slightly limited  Nutrition: 3-->adequate  Friction and Shear: 3-->no apparent problem  Henok Score: 17    Smiley Soto RN CWOCN  Pager no longer is use, please contact through [employer] group: [facility] Nurse  Dept. Office Number: *[phone]    "

## 2023-03-30 NOTE — PROGRESS NOTES
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 Suspicion for urosepsis given hypotension and tachycardia and UA positive for nitrites, leukocyte esterase and large amounts of WBCs. Patient transfer to [facility] for Left Hip Arthroplasty. Surgical procedure was uncomplicated and patient is in stable condition.      #Hx of CVA c/b hemiparesis and hemiplegia  - Stable neuro status  - Functional status improving to the point that family feels they can manage her at home     #Right upper lobe spiculated mass  Seen  on the CT scan on 3/20/2023.  Pulmonology consulted and case discussed.  Pulmonary has advised to rule out tuberculosis.  - Airborne isolation placed  - RT has not been able to induce sputum as noted above.  - Inpatient bronch plan for 3/28, labs pending   - Still pending pulmonology follow-up recommendations    #Stress hyperglycemia superimposed on   #Diabetes mellitus type II   - Blood glucoses have been trending down particular in the morning.  - Currently receiving Lantus 30 units twice daily plus metformin, not yet back on glipizide  - Reduce Lantus to 20 units twice daily, continue metformin, continue to hold glipizide  - We will need to reassess at time of discharge as patient is currently on considerably less medication than baseline    #Left femoral neck fracture due to mechanical fall   #S/p LEFT hip hemiarthroplasty on 3/21  Regional anesthesia placed left iliac fascia block at [place].  Head CT negative for trauma x 2. trauma eval completed prior to transfer to [facility]   - Fall precautions  - PT/OT.   "Appreciate PT OT recommendations  - Posterior hip precautions. WBAT. Up with assist     #Positive blood culture from 3/20/2023  - Blood culture from Paenibacillus species.    - F/u BC neg, likely contaminant      #Acute blood loss anemia due to fracture and  surgical blood loss   - Hgb 8.7, stable  - Plan monitor      #HTN   #HLD  - Stable on resumed amlodipine.  Lipitor     #Hyponatremia (resolved)  #Hypomagnesia, mild  - Oral magnesium replacement     #Hx of urinary retention  #Sepsis ruled out   #History of recurrent UTI  - Positive urine culture on admission likely representing contaminant  - Status post 5-day course of Rocephin  - No further treatment indicated.  - Family to consider outpatient urology evaluation  - Bowel regimen as patient has been constipated since surgery       Diet: Snacks/Supplements Adult: Ensure Enlive; With Meals  Soft & Bite Sized Diet (level 6) Slightly Thick (level 1)    DVT Prophylaxis: Pneumatic Compression Devices  Modi Catheter: Not present  Lines: None     Cardiac Monitoring: None  Code Status: Full Code      Clinically Significant Risk Factors        # Hyperkalemia: Highest K = 5.6 mmol/L in last 2 days, will monitor as appropriate                 # Cachexia: Estimated body mass index is 16.45 kg/m  as calculated from the following:    Height as of this encounter: 1.575 m (5' 2.01\").    Weight as of this encounter: 40.8 kg (89 lb 15.2 oz).   # Severe Malnutrition: based on nutrition assessment        Disposition Plan      Expected Discharge Date: 04/01/2023      Destination: home            Favian Lara DO, MHS  Hospitalist Service, GOLD TEAM 19  [facility]  Securely message with [employer] (more info)  Text page via [facility] Paging/Directory   See signed in provider for up to date coverage information  ______________________________________________________________________    Interval History   Patient is resting comfortably upon entering " room.  Patient endorses no specific symptomatology.  Per nursing staff, no acute issues or clinical concerns.  Discussed case with both pulmonology and infectious disease.  Recommended I reach out to infection prevention.  We need to evaluate criteria for discharge.  AFB smears via bronchoscopy remain negative.    Physical Exam   Vital Signs: Temp: (!) 96  F (35.6  C) Temp src: Oral BP: 132/60 Pulse: 70   Resp: 15 SpO2: 94 % O2 Device: None (Room air)    Weight: 89 lbs 15.16 oz    GENERAL: Alert and oriented x 3; no acute distress; well-nourished.  HEENT: Normocephalic; atraumatic; PERRLA; MMM.  CV: RRR; normal S1, S2; no rubs, murmurs, or gallops.  RESP: Lung fields clear to aucultation B/L; no wheezing or crepitations.  GI: Abdomen is soft, nontender, nondistended; no organomegaly; normal bowel sounds.  : Deferred genital examination.   MSK: No clubbing, cyanosis, or edema.  DERM: Skin is intact; no rash, lesions, or skin breakdown.    Medical Decision Making       45 MINUTES SPENT BY ME on the date of service doing chart review, history, exam, documentation & further activities per the note.      Data     I have personally reviewed the following data over the past 24 hrs:    11.0  \   8.1 (L)   / 257     131 (L) 98 40.0 (H) /  255 (H)   5.0 22 1.11 (H) \       Imaging results reviewed over the past 24 hrs:   No results found for this or any previous visit (from the past 24 hour(s)).

## 2023-03-30 NOTE — PROGRESS NOTES
"Pulmonary Consult Note    Date of Service: 03/30/23    Assessment and Recommendations:  88F CVA w/ R hemiplegia (on clopidogrel), DM2, hx Tb s/p 4mo RIPE therapy (2010) admitted 3/20 following ground-level fall. On trauma eval, found to have spiculated RUL mass, 2.1x2x3.1cm, w/ areas of calcification. Prior CT in 2020 w/ ground-glass nodule in this area, this image is not available to me. This is concerning for adenocarcinoma vs tuberculosis vs other malignancy. Bronchoscopy performed 3/28, results unremarkable to date. Her preliminary AFB results are negative, but these may take time to have a true result. I spoke with lab this AM, they will run the MTB PCR regardless of AFB results. If Tb w/u is negative, this raises my concern for malignancy.     - recommend discussing with ID when we feel Tb is adequately ruled out so she may discharge, discussed this with Dr. Lara of medicine  - continue airborne precautions  - will continue to f/u BAL results   - if Tb negative, recommend follow-up with pulmonary nodule clinic - this referral may be placed by searching \"pulmonary nodule\" in orders, selecting \"(THORACIC) Oncology/Hematology Adult Referral,\" and then selecting \"Interventional Pulmonary (Lung nodule)\" under reason for referral    Chief Complaint   Patient presents with     Fall       Interval Hx:  On RA. Denies SOB, cough, CP. Resting comfortably in bed.     10 point review of systems negative, aside from that mentioned above    /60 (BP Location: Right arm)   Pulse 70   Temp (!) 96  F (35.6  C) (Oral)   Resp 15   Ht 1.575 m (5' 2.01\")   Wt 40.8 kg (89 lb 15.2 oz)   SpO2 94%   BMI 16.45 kg/m    Gen: NAD  HEENT: anicteric, OP clear  Card: RRR  Pulm: clear bilaterally   Abd: soft, NTND  MSK: no LE edema, no acute joint abnormalities  Skin: no obvious rash  Psych: normal affect  Neuro: answering questions appropriately     Labs: personally reviewed    Imaging: personally reviewed    Past Medical History: "   Diagnosis Date     Diabetes mellitus (H)      Unspecified cerebral artery occlusion with cerebral infarction        Past Surgical History:   Procedure Laterality Date     BACK SURGERY       BRONCHOSCOPY FLEXIBLE - REQUIRES NEGATIVE AIRFLOW ROOM N/A 3/28/2023    Procedure: BRONCHOSCOPY FLEXIBLE - REQUIRES NEGATIVE AIRFLOW ROOM in PACU 28;  Surgeon: Harpreet Bower MD;  Location: [facility] OR     HEMIARTHROPLASTY HIP Left 3/21/2023    Procedure: Left Hip Hemiarthroplasty;  Surgeon: Jeffrey Paulino MD;  Location: [facility] OR     ORTHOPEDIC SURGERY         History reviewed. No pertinent family history.    Social History     Socioeconomic History     Marital status:      Spouse name: Not on file     Number of children: Not on file     Years of education: Not on file     Highest education level: Not on file   Occupational History     Not on file   Tobacco Use     Smoking status: Never     Smokeless tobacco: Never   Substance and Sexual Activity     Alcohol use: Not Currently     Drug use: Never     Sexual activity: Not Currently   Other Topics Concern     Not on file   Social History Narrative     Not on file     Social Determinants of Health     Financial Resource Strain: Not on file   Food Insecurity: Not on file   Transportation Needs: Not on file   Physical Activity: Not on file   Stress: Not on file   Social Connections: Not on file   Intimate Partner Violence: Not on file   Housing Stability: Not on file       Harpreet Bower MD  Pulmonary and Critical Care Medicine  [facility]

## 2023-03-31 ENCOUNTER — TELEPHONE (OUTPATIENT)
Dept: ORTHOPEDICS | Facility: CLINIC | Age: 88
End: 2023-03-31
Payer: COMMERCIAL

## 2023-03-31 VITALS
DIASTOLIC BLOOD PRESSURE: 62 MMHG | OXYGEN SATURATION: 95 % | BODY MASS INDEX: 16.55 KG/M2 | RESPIRATION RATE: 16 BRPM | WEIGHT: 89.95 LBS | HEART RATE: 71 BPM | HEIGHT: 62 IN | SYSTOLIC BLOOD PRESSURE: 129 MMHG | TEMPERATURE: 97.1 F

## 2023-03-31 LAB
ANION GAP SERPL CALCULATED.3IONS-SCNC: 12 MMOL/L (ref 7–15)
ANNOTATION COMMENT IMP: NOT DETECTED
BUN SERPL-MCNC: 35.3 MG/DL (ref 8–23)
CALCIUM SERPL-MCNC: 10.2 MG/DL (ref 8.8–10.2)
CHLORIDE SERPL-SCNC: 98 MMOL/L (ref 98–107)
CREAT SERPL-MCNC: 0.85 MG/DL (ref 0.51–0.95)
DEPRECATED HCO3 PLAS-SCNC: 25 MMOL/L (ref 22–29)
DEPRECATED M TB RPOB XXX QL NAA+PROBE: NORMAL
ERYTHROCYTE [DISTWIDTH] IN BLOOD BY AUTOMATED COUNT: 14.8 % (ref 10–15)
GFR SERPL CREATININE-BSD FRML MDRD: 66 ML/MIN/1.73M2
GLUCOSE BLDC GLUCOMTR-MCNC: 219 MG/DL (ref 70–99)
GLUCOSE BLDC GLUCOMTR-MCNC: 340 MG/DL (ref 70–99)
GLUCOSE BLDC GLUCOMTR-MCNC: 346 MG/DL (ref 70–99)
GLUCOSE BLDC GLUCOMTR-MCNC: 369 MG/DL (ref 70–99)
GLUCOSE SERPL-MCNC: 298 MG/DL (ref 70–99)
HCT VFR BLD AUTO: 29.3 % (ref 35–47)
HGB BLD-MCNC: 9.4 G/DL (ref 11.7–15.7)
M PNEUMO DNA SPEC QL NAA+PROBE: NOT DETECTED
M TB DNA SPEC QL NAA+PROBE: NOT DETECTED
MAGNESIUM SERPL-MCNC: 4.3 MG/DL (ref 1.7–2.3)
MCH RBC QN AUTO: 32.9 PG (ref 26.5–33)
MCHC RBC AUTO-ENTMCNC: 32.1 G/DL (ref 31.5–36.5)
MCV RBC AUTO: 102 FL (ref 78–100)
PLATELET # BLD AUTO: 390 10E3/UL (ref 150–450)
POTASSIUM SERPL-SCNC: 5 MMOL/L (ref 3.4–5.3)
RBC # BLD AUTO: 2.86 10E6/UL (ref 3.8–5.2)
SODIUM SERPL-SCNC: 135 MMOL/L (ref 136–145)
WBC # BLD AUTO: 10.8 10E3/UL (ref 4–11)

## 2023-03-31 PROCEDURE — 250N000013 HC RX MED GY IP 250 OP 250 PS 637: Performed by: SURGERY

## 2023-03-31 PROCEDURE — 85014 HEMATOCRIT: CPT

## 2023-03-31 PROCEDURE — 250N000013 HC RX MED GY IP 250 OP 250 PS 637: Performed by: NURSE PRACTITIONER

## 2023-03-31 PROCEDURE — 36415 COLL VENOUS BLD VENIPUNCTURE: CPT

## 2023-03-31 PROCEDURE — 99239 HOSP IP/OBS DSCHRG MGMT >30: CPT | Performed by: INTERNAL MEDICINE

## 2023-03-31 PROCEDURE — 82310 ASSAY OF CALCIUM: CPT

## 2023-03-31 PROCEDURE — 250N000013 HC RX MED GY IP 250 OP 250 PS 637: Performed by: INTERNAL MEDICINE

## 2023-03-31 PROCEDURE — 250N000013 HC RX MED GY IP 250 OP 250 PS 637: Performed by: PHYSICIAN ASSISTANT

## 2023-03-31 PROCEDURE — 83735 ASSAY OF MAGNESIUM: CPT | Performed by: SURGERY

## 2023-03-31 RX ORDER — TRIAMCINOLONE ACETONIDE 1 MG/G
CREAM TOPICAL 2 TIMES DAILY
Qty: 30 G | Refills: 0 | Status: SHIPPED | OUTPATIENT
Start: 2023-03-31

## 2023-03-31 RX ORDER — GLIPIZIDE 10 MG/1
2 TABLET ORAL 4 TIMES DAILY PRN
Qty: 15 ML | Refills: 0 | Status: SHIPPED | OUTPATIENT
Start: 2023-03-31 | End: 2023-04-30

## 2023-03-31 RX ORDER — ACETAMINOPHEN 325 MG/1
975 TABLET ORAL EVERY 8 HOURS PRN
COMMUNITY
Start: 2023-03-31

## 2023-03-31 RX ADMIN — SENNOSIDES AND DOCUSATE SODIUM 1 TABLET: 50; 8.6 TABLET ORAL at 07:54

## 2023-03-31 RX ADMIN — AMLODIPINE BESYLATE 2.5 MG: 2.5 TABLET ORAL at 07:53

## 2023-03-31 RX ADMIN — CLOPIDOGREL BISULFATE 75 MG: 75 TABLET ORAL at 07:54

## 2023-03-31 RX ADMIN — FEXOFENADINE HYDROCHLORIDE 90 MG: 30 SUSPENSION ORAL at 07:58

## 2023-03-31 RX ADMIN — TRIAMCINOLONE ACETONIDE: 1 CREAM TOPICAL at 08:02

## 2023-03-31 RX ADMIN — POLYETHYLENE GLYCOL 3350 17 G: 17 POWDER, FOR SOLUTION ORAL at 07:51

## 2023-03-31 RX ADMIN — MAGNESIUM OXIDE TAB 400 MG (241.3 MG ELEMENTAL MG) 400 MG: 400 (241.3 MG) TAB at 07:55

## 2023-03-31 RX ADMIN — ACETAMINOPHEN 975 MG: 325 TABLET, FILM COATED ORAL at 07:52

## 2023-03-31 RX ADMIN — ATORVASTATIN CALCIUM 20 MG: 20 TABLET, FILM COATED ORAL at 07:53

## 2023-03-31 RX ADMIN — ACETAMINOPHEN 975 MG: 325 TABLET, FILM COATED ORAL at 00:05

## 2023-03-31 ASSESSMENT — ACTIVITIES OF DAILY LIVING (ADL)
ADLS_ACUITY_SCORE: 78
ADLS_ACUITY_SCORE: 74
ADLS_ACUITY_SCORE: 74
ADLS_ACUITY_SCORE: 78

## 2023-03-31 NOTE — PROGRESS NOTES
Care Management Discharge Note    Discharge Date: 03/31/2023       Discharge Disposition: Home Care    Discharge Services:  (resumption of PCA services)    Discharge DME:  (TBD)    Discharge Transportation: family or friend will provide    Private pay costs discussed: transportation costs    Education Provided on the Discharge Plan:  yes  Persons Notified of Discharge Plans: Angel  Patient/Family in Agreement with the Plan: yes    Handoff Referral Completed: Yes - external handoff    Additional Information:  During rounds this AM, provider stated pt can discharge today.    12:02pm - Called srinivasan Ortiz  requesting call back.    Pt's bedside RN informed this writer that pt's family is present in room.    Met w/ Angel, she inquired about transport to get pt home today.  Informed Angel that stretcher transport is required d/t airborne precautions, Angel expressed understanding and was agreeable.  Informed her it gets billed to insurance but there is no guarantee of coverage, she expressed understanding.  She stated they will wait until pt is going to get transported and get discharge teaching, then will get home to meet pt when she arrives.    PCS form completed and placed at nurses' station.    Rocio HANSEN scheduled stretcher transport w/ estimated pickup time 20 min from scheduling.    Informed bedside RN to pickup time, RN acknowledged.  Informed Angel, she stated that will work fine.    Orders sent to [facility] (ph:[phone] fx:[phone]) via [facility] [facility] tab.    Called [facility], spoke w/ Lady in intake, informed her that pt discharged and that orders were sent.  Lady confirmed that they were received, was appreciative for the call.          RNCC available as needed.    Doyle Celaya, RNCC

## 2023-03-31 NOTE — PLAN OF CARE
Goal Outcome Evaluation:         VS: VSS.  No chest pain, no SOB   O2: >94% on RA, maintaining sats.   Output: Pt incontinent- purewick and brief in place   Last BM: 3/30/23 (2 BMs) - pt incontinent with BM   Activity: Reposition in bed with assist of 2  Assist of 2 for ashley care    Skin: Intact except surgical incision and WOC nurse staged wound on coccyx as Stage 2 pressure injury.    Pain: Pt denied any pain this shift  Pain only when turing/ rolling to complete cares   CMS: CMS and neuro intact to baseline.   Dressing: Cleaned coccyx dressing/wound change due every 3 days- next due 4/2/23.  Surgical incision- dressing removed- open to air- clean dry and staples intact.   Diet: Soft and bite sized diet level 6.  Slightly thickened liquids  Pt takes oral medications crushed in apple sauce.  BGs at meals, lunch, dinner, and bedtime   LDA: PIV- L lower arm- SL   Equipment: Walker, IV pole, personal belongings.   Plan: Waiting for TB result, plan to discharge home 4/1.   Additional Info: Airborne Contact    Ordered pulsate mattress d/t wound on coccyx.  Pt Polish speaking, Ipad at bedside.    Hyperglycemic  MDs paged to address BG levels. Additional aspart given per STAT orders to address high numbers. See progress notes. Continue to monitor and notify MD of BGs gato Pate

## 2023-03-31 NOTE — DISCHARGE SUMMARY
[facility]  Hospitalist Discharge Summary      Date of Admission:  3/20/2023  Date of Discharge:  3/31/2023  Discharging Provider: Favian Lara DO, MHS  Discharge Service: Hospitalist Service, GOLD TEAM 19    Discharge Diagnoses   Left femoral neck fracture due to mechanical fall  Right upper lobe spiculated mass  Stress hyperglycemia superimposed on   Diabetes mellitus type II  Left femoral neck fracture due to mechanical fall  S/p LEFT hip hemiarthroplasty on 3/21  Positive blood culture from 3/20/2023  Acute blood loss anemia due to fracture and  surgical blood   HTN   HLD  Hyponatremia (resolved)  Hypomagnesia, mild  Hx of urinary retention  Sepsis ruled out   History of recurrent UTI    Follow-ups Needed After Discharge   Follow-up Appointments     Adult [facility] Follow-up and recommended labs and tests      Please follow-up with primary care provider at patient's earliest   convenience.  Please follow-up with pulmonary nodule clinic at patient's earliest   convenience.    Appointments on [place] and/or [facility] (with [facility] or [facility]   provider or service). Call [phone] if you haven't heard regarding   these appointments within 7 days of discharge.             Unresulted Labs Ordered in the Past 30 Days of this Admission     Date and Time Order Name Status Description    3/28/2023  8:30 AM Mycoplasma pneumoniae by PCR In process     3/28/2023  8:30 AM Fungal or Yeast Culture Routine Preliminary     3/28/2023  8:30 AM Nocardia species culture Preliminary     3/28/2023  8:30 AM Acid-Fast Bacilli Culture and Stain In process       These results will be followed up by pulmonology.    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 Suspicion for urosepsis given hypotension and tachycardia and UA positive for nitrites, leukocyte esterase and large amounts of WBCs. Patient transfer to [facility] for Left Hip Arthroplasty. Surgical procedure was uncomplicated and patient is in stable condition.      #Hx of CVA c/b hemiparesis and hemiplegia  - Stable neuro status  - Functional status improving to the point that family feels they can manage her at home     #Right upper lobe spiculated mass  Seen  on the CT scan on 3/20/2023.  Pulmonology consulted and case discussed.  Pulmonary has advised to rule out tuberculosis.  - Airborne isolation placed  - RT has not been able to induce sputum as noted above.  - Inpatient bronch plan for 3/28, labs pending   - Still pending pulmonology follow-up recommendations    #Stress hyperglycemia superimposed on   #Diabetes mellitus type II   - Blood glucoses have been trending down particular in the morning.  - Currently receiving Lantus 30 units twice daily plus metformin, not yet back on glipizide  - Reduce Lantus to 20 units twice daily, continue metformin, continue to hold glipizide  - We will need to reassess at time of discharge as patient is currently on considerably less medication than baseline    #Left femoral neck fracture due to mechanical fall   #S/p LEFT hip hemiarthroplasty on 3/21  Regional anesthesia placed left iliac fascia block at [place].  Head CT negative for trauma x 2. trauma eval completed [facility]   - Fall precautions  - PT/OT.  Appreciate PT OT recommendations  - Posterior hip precautions. WBAT. Up with assist     #Positive blood culture from 3/20/2023  - Blood culture from Paenibacillus species.    - F/u BC neg, likely contaminant      #Acute blood loss anemia due to fracture and  surgical blood loss   - Hgb 8.7, stable  - Plan monitor      #HTN   #HLD  - Stable on resumed amlodipine.   Lipitor     #Hyponatremia (resolved)  #Hypomagnesia, mild  - Oral magnesium replacement     #Hx of urinary retention  #Sepsis ruled out   #History of recurrent UTI  - Positive urine culture on admission likely representing contaminant  - Status post 5-day course of Rocephin  - No further treatment indicated.  - Family to consider outpatient urology evaluation  - Bowel regimen as patient has been constipated since surgery    Consultations This Hospital Stay   MEDICATION HISTORY IP PHARMACY CONSULT  PHARMACY TO DOSE VANCO  REGIONAL ANESTHESIA PAIN SERVICE ADULT IP CONSULT  ADVANCE DIRECTIVE IP CONSULT  PHYSICAL THERAPY ADULT IP CONSULT  OCCUPATIONAL THERAPY ADULT IP CONSULT  CARE MANAGEMENT / SOCIAL WORK IP CONSULT  INTERNAL MEDICINE ADULT IP CONSULT FOR [facility]  NUTRITION SERVICES ADULT IP CONSULT  SPEECH LANGUAGE PATH ADULT IP CONSULT  MEDICATION HISTORY IP PHARMACY CONSULT  INFECTIOUS DISEASE [facility] ADULT IP CONSULT  PULMONARY GENERAL ADULT IP CONSULT  WOUND OSTOMY CONTINENCE NURSE  IP CONSULT    Code Status   Full Code    Time Spent on this Encounter   IFavian DO, personally saw the patient today and spent greater than 30 minutes discharging this patient.       Favian Lara DO, [facility] MED SURG ORTHOPEDIC  [address] [place] [id number]  Phone: [phone]  Fax: [phone]  ______________________________________________________________________       Primary Care Physician   Liz Rodriguez    Discharge Orders      Home Care Referral      Resume Home Care Services    Resume skilled nursing services     Reason for your hospital stay    Patient was admitted to the hospital for left femoral neck fracture due to mechanical fall.  A right upper lobe spiculated mass was incidentally found on imaging.  This prompted a TB rule out.  Patient underwent bronchoscopy.  AFB smear from bronchoalveolar lavage is preliminarily negative to date.     Activity    Your  activity upon discharge: activity as tolerated.  Please contact health department for specific household TB considerations pending full rule out.     Adult [facility] Follow-up and recommended labs and tests    Please follow-up with primary care provider at patient's earliest convenience.  Please follow-up with pulmonary nodule clinic at patient's earliest convenience.    Appointments on [place] and/or [facility] (with [facility] or [facility] provider or service). Call [phone] if you haven't heard regarding these appointments within 7 days of discharge.     Diet    Follow this diet upon discharge: Orders Placed This Encounter      Snacks/Supplements Adult: Ensure Enlive; With Meals      Soft & Bite Sized Diet (level 6) Slightly Thick (level 1)       Significant Results and Procedures   Results for orders placed or performed during the hospital encounter of 03/20/23   CT Chest/Abdomen/Pelvis w Contrast    Narrative    EXAMINATION: CT CHEST/ABDOMEN/PELVIS W CONTRAST, 3/20/2023 9:14 PM    TECHNIQUE:  Helical CT images from the thoracic inlet through the  symphysis pubis were obtained  with contrast. Contrast dose: iopamidol  (ISOVUE-370) solution 55 mL    COMPARISON: 6/3/2022, 3/14/2010    HISTORY: fall, low chest/abdominal pain, left hip/leg pain    FINDINGS:    Chest: Subcentimeter hypoattenuating nodule within the inferior left  thyroid lobe. No suspicious base of the neck or axillary  lymphadenopathy. Nonspecific hyperattenuating periesophageal region,  favored to represent adjacent lymph node (series 3 image 72).  Additional prominent mediastinal and perihilar lymph nodes such as 2.2  x 1.2 centimeter subcarinal lymph node (series 3 image 69), some of  which are at least partially calcified. Normal caliber of the major  thoracic vessels. No definite evidence of pulmonary embolism in this  nondedicated study. Conventional branching pattern of the aortic arch.  Normal cardiac size. 2.1 x 2.0 x 3.1 cm spiculated  right upper lobe  lung mass with areas of calcification superiorly. On 3/14/2020 a  groundglass nodule could be visualized in the same area. Areas of  calcification were present previously and appears grossly stable.  Consolidation of the right middle lobe, likely fibrosis from prior  abscess. Cluster of calcified granulomas in the left upper lobe  (series 6 image 32).    Liver: No mass. No intrahepatic biliary ductal dilation.    Biliary System: Normal gallbladder. No extrahepatic biliary ductal  dilation.    Pancreas: No pancreatic ductal dilation. 1.3 x 1.2 cm cystic lesion in  the inferior aspect of the uncinate process of the pancreatic head,  unchanged from prior CT. No calcifications or internal masses. Wall is  prominent but unchanged.    Adrenal glands: No mass or nodules    Spleen: Normal.    Kidneys: No suspicious mass, obstructing calculus or hydronephrosis.   Simple renal cysts.    Gastrointestinal tract :Normal appendix. Normal caliber small bowel.   Duodenal diverticula in the terminal descending duodenum (series 7  image 160)    Mesentery/peritoneum/retroperitoneum: No mass. No free fluid or air.    Lymph nodes: No significant lymphadenopathy.    Vasculature: Patent major abdominal vasculature.    Pelvis: Bladder wall thickening with surrounding fat stranding in the  left bladder wall. Atrophic uterus.      Soft tissues: Sacral pressure ulcer. Asymmetric atrophy of the left  shoulder and visualized arm muscles.    Osseous structures: Displaced fracture of the left femoral neck with  mild valgus angulation.. Diffuse osteopenia. Although not excluded  this does not appear to represent a pathologic fracture. Sclerotic  lesion in the right pubic bone, unchanged from 6/3/2022. Unchanged  posterior fixation hardware of L4-L5. No additional fractures are  visualized. Diffusely osteopenic bones      Impression    IMPRESSION:   1. Right upper lobe spiculated mass. On review of prior from 2010  there was a  groundglass nodule in the same region which could indicate  a slow growing adenocarcinoma. Differential include infection and  scarring from prior infection.  2. Unchanged calcified nodules at the left apex, likely old  granulomatous disease.  3. Calcified and noncalcified prominent mediastinal lymph nodes. These  are possibly secondary to prior granulomatous disease although  superimposed malignant lymph nodes cannot be excluded.  4.. Visualized left femoral neck fracture with diffuse osteopenia.  Pathologic fracture not excluded given the findings in impression #1.  No additional suspicious osseous lesions are visualized.  5. Unchanged appearance of the wall thickening with faint surrounding  fat stranding, suggestive of cystitis. Correlate with urinalysis.  6. 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 Stable since prior CT of the abdomen dictated  6/3/2022.    I have personally reviewed the examination and initial interpretation  and I agree with the findings.    HALI ACOSTA MD         SYSTEM ID:  [id number]   Lumbar spine CT w/o contrast    Narrative    EXAM: CT LUMBAR SPINE W/O CONTRAST  3/20/2023 9:13 PM     HISTORY:  RECON from CT A/P - fall, pain       COMPARISON:  Same day CT CAP    TECHNIQUE: Using multidetector thin collimation helical acquisition  technique, axial, sagittal and coronal 3 mm thickness CT  reconstructions were obtained through the lumbar spine without  intravenous contrast.  Images were viewed in bone and soft tissue  windows.    FINDINGS:  5 lumbar-type vertebrae are verified by the same day CT CAP. L4-5  posterior lumbar fixation hardware with ankylosis of the posterior  elements. No evidence for hardware failure. Grade 1 anterolisthesis of  L4 and L5, unchanged. No significant disc space narrowing. No acute  fracture. No suspicious osseous lesion. Multilevel lumbar degenerative  changes including anterior osteophytic spurring and Schmorl's  nodes.  Osteopenic-appearing bones.    Findings on a level by level basis are as follows:    L1-L2: No spinal canal or neural foraminal stenosis    L2-L3: Circumferential disc bulge. Bilateral facet hypertrophy.  Mild-moderate left neural foraminal narrowing. Mild right  neuroforaminal narrowing. Mild spinal canal narrowing.    L3-L4: Circumferential disc bulge. Ligamentum flavum thickening and  bilateral facet hypertrophy. Mild to moderate bilateral neural  foraminal narrowing. Mild spinal canal narrowing.    L4-L5: Grade 1 anterolisthesis. Posterior fusion instrumentation and  laminectomy changes. Posterior disc bulge and bilateral facet  hypertrophy. Mild bilateral neural foraminal narrowing. Mild spinal  canal narrowing.    L5-S1: Circumferential disc bulge and bilateral facet hypertrophy.  Laminectomy changes. Mild spinal canal narrowing. Moderate right and  mild left neural foraminal narrowing.    Diffuse atrophy of the erector spinae musculature, particularly at and  below the posterior fusion levels.    Bilateral renal cysts.      Impression    IMPRESSION:  1. No acute fracture or traumatic subluxation.  2. L4-5 fusion instrumentation without evidence for hardware failure.  3. Lumbar degenerative changes without high-grade spinal canal  stenosis.    I have personally reviewed the examination and initial interpretation  and I agree with the findings.    YENNIFER BUSTILLO MD         SYSTEM ID:  [id number]   Head CT w/o contrast    Narrative    EXAM: CT HEAD W/O CONTRAST  3/20/2023 9:00 PM     HISTORY:  fall, cant move left leg well Can't tell if pain or strength  issue       COMPARISON:  Head CT 1/1/2013    TECHNIQUE: Using multidetector thin collimation helical acquisition  technique, axial, coronal and sagittal CT images from the skull base  to the vertex were obtained without intravenous contrast.   (topogram) image(s) also obtained and reviewed.    FINDINGS:  No acute intracranial hemorrhage, mass effect,  or midline shift. No  acute loss of gray-white matter differentiation in the cerebral  hemispheres. Ventricles are proportionate to the cerebral sulci. Clear  basal cisterns. Confluent T2 periventricular hypodensity which may  relate to chronic small vessel ischemic disease. Old right corona  radiata infarct, unchanged. Cerebral and cerebellar volume loss. Basal  ganglia calcifications.    The bony calvaria and the bones of the skull base are normal.  Incidental 6 mm left ethmoid osteoma, unchanged. Mild left mastoid  effusion, also unchanged. Grossly normal orbits.       Impression    IMPRESSION:   1. No acute intracranial pathology.  2. Unchanged old right corona radiata infarct.    I have personally reviewed the examination and initial interpretation  and I agree with the findings.    YENNIFER BUSTILLO MD         SYSTEM ID:  [id number]   Cervical spine CT w/o contrast    Narrative    EXAM: CT CERVICAL SPINE W/O CONTRAST  3/20/2023 8:59 PM     HISTORY:  fall, > 66 yo, can't move left leg well, fall, > 66 yo,  can't move left leg well       COMPARISON:  None.    TECHNIQUE: Using multidetector thin collimation helical acquisition  technique, axial, coronal and sagittal CT images through the cervical  spine were obtained without intravenous contrast.    FINDINGS:  Mild leftward convexity curvature. Reversal of normal cervical  lordosis. Normal vertebral body alignment. No acute fracture or  traumatic subluxation. Mild disc space loss at C4-5. Mild loss of disc  height at C5-6 and C6-7. No prevertebral edema. Multilevel  degenerative changes including vertebral endplate irregularity, vacuum  phenomenon, and endplate osteophytic spurring.    The findings on a level by level basis are as follows:    C2-3:  Left facet hypertrophy. No spinal canal or neural foraminal  stenosis.    C3-4:  Posterior disc bulge and superimposed central disc herniation.  Moderate spinal canal narrowing. No neural foraminal narrowing.    C4-5:   Posterior disc osteophyte complex. Right greater than left  uncinate hypertrophy. Mild spinal canal narrowing. Mild right neural  foraminal narrowing. No left neural foraminal stenosis.    C5-6:  Disc osteophyte complex. No spinal canal narrowing. No  neuroforaminal narrowing.    C6-7:  Disc osteophyte complex. No spinal canal or neural foraminal  stenosis.    C7-T1:  No spinal canal or neural foraminal stenosis.     No abnormality of the paraspinous soft tissues. Please see same-day CT  CAP for body findings, including findings in the lungs.      Impression    IMPRESSION:  1. No acute fracture or traumatic subluxation.  2. Cervical spondylosis without high-grade spinal canal or neural  foraminal stenosis.    I have personally reviewed the examination and initial interpretation  and I agree with the findings.    YENNIFER BUSTILLO MD         SYSTEM ID:  [id number]   XR Pelvis w Hip Left 1 View    Narrative    EXAM: XR PELVIS AND HIP LEFT 1 VIEW  LOCATION: [facility]  DATE/TIME: 3/20/2023 5:39 PM    INDICATION: left hip leg pain  COMPARISON: CT abdomen pelvis 06/03/2022      Impression    IMPRESSION: Acute fracture of the left femoral neck with mild displacement and varus angulation. The margins of the fracture are ill-defined and an underlying lytic lesion is possible but not considered likely. CT could assess further.    There is diffuse bony demineralisation. Mild degenerative arthritis left hip joint. Sclerotic lesions involving the pubic bones are stable. Postoperative and degenerative changes in lumbar spine.   XR Femur Left 2 Views    Narrative    EXAM: XR FEMUR LEFT 2 VIEWS  LOCATION: [facility]  DATE/TIME: 3/20/2023 10:17 PM    INDICATION: full length femur for ortho  COMPARISON: Left hip 03/20/2023 at 5:26 PM      Impression    IMPRESSION: Left femoral neck fracture again identified. No distal femur fracture or other  acute abnormality.   CT Head w/o Contrast    Narrative    CT HEAD W/O CONTRAST 3/21/2023 6:43 AM    History: interval FU fall, on plavix     Comparison: 3/20/2023    Technique: Using multidetector thin collimation helical acquisition  technique, axial, coronal and sagittal CT images from the skull base  to the vertex were obtained without intravenous contrast.    Findings: Diffuse cerebral and cerebellar parenchymal volume loss.  Scattered periventricular and subcortical white matter hypodensities,  likely sequela of chronic small vessel ischemic disease. Bilateral  basal ganglia calcifications. Stable lacunar infarct within the right  corona radiata. Unchanged presumed small arachnoid cyst in the left  frontal convexity. There is no intracranial hemorrhage, mass effect,  or midline shift. Gray/white matter differentiation in both cerebral  hemispheres is preserved. Ventricles are proportionate to the cerebral  sulci. The basal cisterns are clear. Bilateral pseudophakia. No acute  calvarial fractures. The right mastoid air cells are clear. Unchanged  small left mastoid effusion. Unchanged scattered mucosal thickening in  the paranasal sinuses.      Impression    Impression:  No acute intracranial pathology. Examination is not significantly  changed compared to prior on 3/20/2023.    I have personally reviewed the examination and initial interpretation  and I agree with the findings.    ELIECER WEINER MD         SYSTEM ID:  [id number]   POC US Guidance Needle Placement    Impression    Left fascia iliaca nerve catheter placement.    XR Surgery SUREKHA L/T 5 Min Fluoro    Narrative    This exam was marked as non-reportable because it will not be read by a   radiologist or a [place] non-radiologist provider.         XR Hip Left 2-3 Views    Narrative    EXAM: PELVIS AND LEFT HIP 2 VIEWS  LOCATION: [facility]  DATE/TIME: 3/21/2023 10:27 PM    INDICATION: Status post left hip  hemiarthroplasty.  COMPARISON: 2023 - Left femur radiographs.      Impression    IMPRESSION:   1. Evidence of recent left hip surgery.  2. A left hip arthroplasty is in place. No evidence of hardware failure.  3. No visualized acute fracture or malalignment of the pelvis.   Echo Complete     Value    LVEF  55-60%    [facility]    [id number]  [id number]  [id number]  [id number]^TINA^ERASTO^BETHANY     [facility],[place]  Echocardiography Laboratory  [address] [place] [id number]     Name: ERIKA SERRANO  MRN: [id number]  : 1934  Study Date: 2023 07:41 AM  Age: 88 yrs  Gender: Female  Patient Location: [facility]  Reason For Study: Syncope and Collapse  Ordering Physician: ERSATO WILDER  Performed By: Zandra Lawson     BSA: 1.4 m2  Height: 62 in  Weight: 89 lb  HR: 81  BP: 127/71 mmHg  ______________________________________________________________________________  Procedure  Echocardiogram with two-dimensional, color and spectral Doppler performed.  ______________________________________________________________________________  Interpretation Summary  Global and regional left ventricular function is normal with an EF of 55-60%.  Mild concentric wall thickening consistent with left ventricular hypertrophy  is present.  Global right ventricular function is normal. The right ventricle is normal  size.  Mild mitral insufficiency is present.  Mild tricuspid insufficiency is present.  The inferior vena cava cannot be assessed.  Trivial pericardial effusion is present.  Chamber compression is not present; there is no evidence for tamponade.  ______________________________________________________________________________  Left Ventricle  Global and regional left ventricular function is normal with an EF of 55-60%.  Left ventricular size is normal. Mild concentric wall thickening consistent  with left ventricular hypertrophy is present. Left ventricular diastolic  function is  indeterminate.     Right Ventricle  Global right ventricular function is normal. The right ventricle is normal  size.     Atria  Both atria appear normal.     Mitral Valve  The mitral valve is normal. Mild mitral insufficiency is present.     Aortic Valve  Trileaflet aortic sclerosis without stenosis. Trace aortic insufficiency is  present.     Tricuspid Valve  The tricuspid valve is normal. Mild tricuspid insufficiency is present.  Pulmonary artery systolic pressure cannot be assessed.     Pulmonic Valve  The pulmonic valve is normal. Trace pulmonic insufficiency is present.     Vessels  The aorta root is normal. The thoracic aorta is normal. The pulmonary artery  cannot be assessed. The inferior vena cava cannot be assessed.     Pericardium  Trivial pericardial effusion is present. Chamber compression is not present;  there is no evidence for tamponade.     Compared to Previous Study  Previous study not available for comparison.  ______________________________________________________________________________  MMode/2D Measurements & Calculations     IVSd: 1.3 cm  LVIDd: 3.6 cm  LVIDs: 2.2 cm  LVPWd: 0.96 cm  FS: 38.9 %  LV mass(C)d: 132.1 grams  LV mass(C)dI: 97.5 grams/m2  asc Aorta Diam: 3.4 cm  LVOT diam: 2.0 cm  LVOT area: 3.1 cm2  RWT: 0.53  TAPSE: 2.0 cm     Doppler Measurements & Calculations  MV E max graciela: 36.8 cm/sec  MV A max graciela: 76.7 cm/sec  MV E/A: 0.48  MV dec slope: 259.3 cm/sec2  MV dec time: 0.14 sec  E/E' av.4  Lateral E/e': 6.8  Medial E/e': 10.1     ______________________________________________________________________________  Report approved by: MD Tuan Adhikari 2023 09:41 AM               Discharge Medications   Current Discharge Medication List      START taking these medications    Details   acetaminophen (TYLENOL) 325 MG tablet Take 3 tablets (975 mg) by mouth every 8 hours as needed for mild pain    Associated Diagnoses: Pain      artificial tears (GENTEAL)  "0.1-0.2-0.3 % ophthalmic solution Place 2 drops into both eyes 4 times daily as needed for dry eyes  Qty: 15 mL, Refills: 0    Associated Diagnoses: Dry eyes      triamcinolone (KENALOG) 0.1 % external cream Apply topically 2 times daily  Qty: 30 g, Refills: 0    Associated Diagnoses: Rash         CONTINUE these medications which have NOT CHANGED    Details   amLODIPine (NORVASC) 2.5 MG tablet Take 1 tablet by mouth daily      atorvastatin (LIPITOR) 20 MG tablet Take 1 tablet by mouth every evening      clopidogrel (PLAVIX) 75 MG tablet Take 75 mg by mouth daily      fexofenadine (ALLEGRA) 180 MG tablet Take 180 mg by mouth 2 times daily      insulin aspart (NOVOLOG PEN) 100 UNIT/ML pen Inject Subcutaneous 3 times daily (with meals) 2-3 times a day before meals.      LANTUS SOLOSTAR 100 UNIT/ML soln INJECT 40 UNITS IN MORNING AND 32 UNITS IN EVENING      metformin (GLUCOPHAGE) 1000 MG tablet Take 1 tablet by mouth every 12 hours.      mirtazapine (REMERON) 7.5 MG tablet TAKE 1 TABLET BY MOUTH EVERYDAY AT BEDTIME      senna-docusate (SENOKOT-S/PERICOLACE) 8.6-50 MG tablet Take 1 tablet by mouth 2 times daily      triamcinolone (KENALOG) 0.1 % external ointment Apply topically 2 times daily as needed for irritation (rash and itching)      vitamin D3 (CHOLECALCIFEROL) 50 mcg (2000 units) tablet Take 1 tablet by mouth daily         STOP taking these medications       betamethasone valerate (VALISONE) 0.1 % external cream Comments:   Reason for Stopping:         hypromellose-dextran (ARTIFICAL TEARS) 0.1-0.3 % ophthalmic solution Comments:   Reason for Stopping:             Allergies   Allergies   Allergen Reactions     Aspirin Unknown     Irbesartan Unknown     Penicillins Other (See Comments)     Pt has some sort of reaction at dentist office after PEN supposedly. Pt cannot verify nor can family. Pt has no reaction to \"stephane\" products as was previously indicated. This was verified by familly. All this reviewed with " opthalmalogist and anestheiologist on 5/17/2018

## 2023-03-31 NOTE — PROGRESS NOTES
Brief Cross Cover Note    I was notified that Julio's blood sugars continued to be high at midnight. Since she had just received insulin 1 hour prior, I requested a recheck at 2 pm, at which time it would be safe to redose insulin again if her blood sugars continued to be high.     0300 glucose 346. Given 6 units novolog. Will start increased sliding scale and home lantus dosing this morning.       Tia Holly MD, MPH  Internal Medicine-Pediatrics [facility]

## 2023-03-31 NOTE — PROGRESS NOTES
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 Suspicion for urosepsis given hypotension and tachycardia and UA positive for nitrites, leukocyte esterase and large amounts of WBCs. Patient transfer to [facility] for Left Hip Arthroplasty. Surgical procedure was uncomplicated and patient is in stable condition.      #Hx of CVA c/b hemiparesis and hemiplegia  - Stable neuro status  - Functional status improving to the point that family feels they can manage her at home     #Right upper lobe spiculated mass  Seen  on the CT scan on 3/20/2023.  Pulmonology consulted and case discussed.  Pulmonary has advised to rule out tuberculosis.  - Airborne isolation placed  - RT has not been able to induce sputum as noted above.  - Inpatient bronch plan for 3/28, labs pending   - Still pending pulmonology follow-up recommendations    #Stress hyperglycemia superimposed on   #Diabetes mellitus type II   - Blood glucoses have been trending down particular in the morning.  - Currently receiving Lantus 30 units twice daily plus metformin, not yet back on glipizide  - Reduce Lantus to 20 units twice daily, continue metformin, continue to hold glipizide  - We will need to reassess at time of discharge as patient is currently on considerably less medication than baseline    #Left femoral neck fracture due to mechanical fall   #S/p LEFT hip hemiarthroplasty on 3/21  Regional anesthesia placed left iliac fascia block at [place].  Head CT negative for trauma x 2. trauma eval completed prior to transfer to [facility]   - Fall precautions  - PT/OT.   "Appreciate PT OT recommendations  - Posterior hip precautions. WBAT. Up with assist     #Positive blood culture from 3/20/2023  - Blood culture from Paenibacillus species.    - F/u BC neg, likely contaminant      #Acute blood loss anemia due to fracture and  surgical blood loss   - Hgb 8.7, stable  - Plan monitor      #HTN   #HLD  - Stable on resumed amlodipine.  Lipitor     #Hyponatremia (resolved)  #Hypomagnesia, mild  - Oral magnesium replacement     #Hx of urinary retention  #Sepsis ruled out   #History of recurrent UTI  - Positive urine culture on admission likely representing contaminant  - Status post 5-day course of Rocephin  - No further treatment indicated.  - Family to consider outpatient urology evaluation  - Bowel regimen as patient has been constipated since surgery       Diet: Snacks/Supplements Adult: Ensure Enlive; With Meals  Soft & Bite Sized Diet (level 6) Slightly Thick (level 1)    DVT Prophylaxis: Pneumatic Compression Devices  Modi Catheter: Not present  Lines: None     Cardiac Monitoring: None  Code Status: Full Code      Clinically Significant Risk Factors                        # Cachexia: Estimated body mass index is 16.45 kg/m  as calculated from the following:    Height as of this encounter: 1.575 m (5' 2.01\").    Weight as of this encounter: 40.8 kg (89 lb 15.2 oz).   # Severe Malnutrition: based on nutrition assessment        Disposition Plan     Expected Discharge Date: 04/01/2023      Destination: home            Favian Lara,   Hospitalist Service, GOLD TEAM 19  [facility]  Securely message with [employer] (more info)  Text page via [facility] Paging/Directory   See signed in provider for up to date coverage information  ______________________________________________________________________    Interval History   Patient has no questions or complaints at this time.  Discussed case with infectious disease, pulmonology, and infection " prevention.  Although TB has not been fully ruled out, if patient is returning home, she can likely be discharged.    Physical Exam   Vital Signs: Temp: 97.1  F (36.2  C) Temp src: Oral BP: 129/62 Pulse: 71   Resp: 16 SpO2: 95 % O2 Device: None (Room air)    Weight: 89 lbs 15.16 oz    GENERAL: Alert and oriented x 3; no acute distress; well-nourished.  HEENT: Normocephalic; atraumatic; PERRLA; MMM.  CV: RRR; normal S1, S2; no rubs, murmurs, or gallops.  RESP: Lung fields clear to aucultation B/L; no wheezing or crepitations.  GI: Abdomen is soft, nontender, nondistended; no organomegaly; normal bowel sounds.  : Deferred genital examination.   MSK: No clubbing, cyanosis, or edema.  DERM: Skin is intact; no rash, lesions, or skin breakdown.  NEURO: No focal deficits appreciated; strength & sensorium are grossly intact.  PSYCH: No active hallucinations; affect, insight appear within normal limits.    Medical Decision Making       45 MINUTES SPENT BY ME on the date of service doing chart review, history, exam, documentation & further activities per the note.      Data

## 2023-03-31 NOTE — PROGRESS NOTES
"  VS: /62   Pulse 71   Temp 97.1  F (36.2  C)   Resp 16   Ht 1.575 m (5' 2.01\")   Wt 40.8 kg (89 lb 15.2 oz)   SpO2 95%   BMI 16.45 kg/m     O2: Room air saturations 95%.    Output: Using pure wick to collect urine. Applied a breif before discharging to home with son and daughter in law.    Last BM: On night shift x 2 soft BMs   Activity: Bedrest. Needs help moving side to side.    Skin: Existing skin breakdown on lower back. New dressing applied to it last night. Daughter in law stated\" She had the skin breakdown before she came into hospital\"    Pain: Denies   CMS: Baseline left sided weakness.    Dressing: Back dressing is CDI   Diet: Bite sized food stage 6. Pt drinks thickened liquid   LDA: Discontinued IV before discharging to home.    Equipment: Pure wick, Isolation to rule out TB   Plan: Patient and patients family was given discharge instructions. Specifically about follow up with pulmonary nodule and follow up with Ortho team. Family seemed to have a full understanding of plan of cares for discharge to home. Patient was escorted via stretcher back to home.    Additional Info: Pt was seen by Dr Lara and pulmonary today. Both were ok with patient be discharged back to home. Please see both providers notes.        "

## 2023-03-31 NOTE — PLAN OF CARE
Goal Outcome Evaluation:         Left Hip Incision    Pt complaining of itch all night. RN assessed incision and noted dressing old with black old blood and coming off. Staples intact and wound healing. Paged ortho and it was noted that pt had not been seen by ortho for a while. Also noted that her incision dressing should be out after 7 days. It has been 10 days since surgery. Ordered to remove dressing and gently clean area, and apply hydrocortisone around the incision for itching. Abduction pillow also removed for continued discomfort.     Nallely Moreno RN on 3/31/2023 at 4:38 AM

## 2023-03-31 NOTE — PROGRESS NOTES
POC    Elavated BGs throughout the day. High of 413 at bedtime. Dr. Gibbons ordered 5 more units on top of the 5 units of aspart administered per sliding scale. Dr. Gibbons changed sliding scale. Checked after 1 hr and at . Notified medicine, and requested to check again at 2am

## 2023-03-31 NOTE — TELEPHONE ENCOUNTER
I spoke with Julio's daughter in law Angel. [phone]. They are struggling to find wheelchair transportation for Young's 2 week post op appointment. She requested a later appointment date to give them time to find transportation. I offered them an appointment with me on Friday 4/7 at 11am. She gratefully accepted. They will call back to again reschedule if they cannot find transportation for that day.  Kelsey Webb ATC

## 2023-04-01 ENCOUNTER — PATIENT OUTREACH (OUTPATIENT)
Dept: CARE COORDINATION | Facility: CLINIC | Age: 88
End: 2023-04-01
Payer: COMMERCIAL

## 2023-04-01 NOTE — PROGRESS NOTES
[facility]    Background: Transitional Care Management program identified per system criteria and reviewed by Conne[facility] Resource [facility] team for possible outreach.    Assessment: Upon chart review, CCRC Team member will not proceed with patient outreach related to this episode of Transitional Care Management program due to reason below:    Non-[facility] TCU: CCRC team member noted patient discharged to TCU/ARU/LTACH. Patient is not established with a [facility] Primary Care Clinic currently supported by [facility] Primary Care-Care Coordination therefore handoff to Primary Care-Care Coordination is not appropriate at this time.    Plan: Transitional Care Management episode addressed appropriately per reason noted above.      Viridiana Chong MA  [facility], [facility]    *Connected Care Resource Team does NOT follow patient ongoing. Referrals are identified based on internal discharge reports and the outreach is to ensure patient has an understanding of their discharge instructions.

## 2023-04-03 LAB — GLUCOSE BLDC GLUCOMTR-MCNC: 100 MG/DL (ref 70–99)

## 2023-04-07 ENCOUNTER — OFFICE VISIT (OUTPATIENT)
Dept: ORTHOPEDICS | Facility: CLINIC | Age: 88
End: 2023-04-07
Payer: COMMERCIAL

## 2023-04-07 DIAGNOSIS — Z98.890 STATUS POST SURGERY: Primary | ICD-10-CM

## 2023-04-07 PROCEDURE — 99207 PR NO CHARGE NURSE ONLY: CPT

## 2023-04-07 NOTE — PROGRESS NOTES
Reason for visit:    Julio Grier came in to the clinic for a two week post op check. English  used via phone.    Her surgery was done 3/21/2023 by Dr Paulino.  She had a Left hip hemiarthroplasty.      Assessment:    Julio came into the clinic in a wheelchair Non-WB, accompanied by her son.    The Surgical wounds were exposed and found to be well-healed; so the staples were removed. Skin is c/d/i. Steri strips were applied. Julio is doing well.    Plan:     She was placed back into her wheelchair.  She was told that she is allowed to WBAT, but she is primarily wheelchair bound. She may get the incision wet.     She has an appointment to see Dr. Paulino at 6 weeks post op and at that time Dr. Paulino will determine further restrictions.    She has our phone number and will call with questions or problems.      Kelsey Alanis is the overseeing provider today.

## 2023-04-25 LAB
BACTERIA BRONCH: NO GROWTH
BACTERIA BRONCH: NO GROWTH

## 2023-05-01 DIAGNOSIS — Z98.890 STATUS POST SURGERY: Primary | ICD-10-CM

## 2023-05-02 ENCOUNTER — ANCILLARY PROCEDURE (OUTPATIENT)
Dept: GENERAL RADIOLOGY | Facility: CLINIC | Age: 88
End: 2023-05-02
Attending: ORTHOPAEDIC SURGERY
Payer: COMMERCIAL

## 2023-05-02 ENCOUNTER — OFFICE VISIT (OUTPATIENT)
Dept: ORTHOPEDICS | Facility: CLINIC | Age: 88
End: 2023-05-02
Payer: COMMERCIAL

## 2023-05-02 DIAGNOSIS — Z98.890 STATUS POST SURGERY: ICD-10-CM

## 2023-05-02 DIAGNOSIS — M25.552 PAIN OF LEFT HIP: Primary | ICD-10-CM

## 2023-05-02 PROCEDURE — 73502 X-RAY EXAM HIP UNI 2-3 VIEWS: CPT | Mod: LT | Performed by: RADIOLOGY

## 2023-05-02 PROCEDURE — 99024 POSTOP FOLLOW-UP VISIT: CPT | Performed by: ORTHOPAEDIC SURGERY

## 2023-05-02 NOTE — NURSING NOTE
Reason For Visit:   Chief Complaint   Patient presents with     RECHECK     6 weeks status post left hip hemiarthroplasty       There were no vitals taken for this visit.         Damari Gallego, ATC

## 2023-05-02 NOTE — LETTER
5/2/2023         RE: Julio Grier  [address]   [place] [address]        Dear Colleague,    Thank you for referring your patient, Julio Grier, to the [facility] [place]. Please see a copy of my visit note below.    CHIEF COMPLAINT:  Status post left hip hemiarthroplasty performed on 03/21/2023.    HISTORY OF PRESENT ILLNESS:  Ms. Grier presents today for further followup in the company of her son.  Reports to be doing okay.  The patient is unable to ambulate secondary to the stroke.  Reports to have some discomfort in the hip with standing.    PHYSICAL EXAMINATION:  On today's exam, she presented with no pain with palpation of the greater trochanter.  Range of motion of the knee is as expected.  She presented with a completely healed surgical incision.    IMAGING:  Plain x-rays were reviewed today which were significant for showing excellent alignment of the left hip hemiarthroplasty without any failure.    ASSESSMENT:  Status post left hip hemiarthroplasty.    PLAN:  Discussed with the patient and her son through an  that she is doing great.  We are going to continue working with physical therapy.  She has no restrictions.  She will be reevaluated in 3 months from now and at that time, 2 views of the left hip will be obtained.    All questions were answered.        Jeffrey Paulino MD

## 2023-05-02 NOTE — PROGRESS NOTES
CHIEF COMPLAINT:  Status post left hip hemiarthroplasty performed on 03/21/2023.    HISTORY OF PRESENT ILLNESS:  Ms. Grier presents today for further followup in the company of her son.  Reports to be doing okay.  The patient is unable to ambulate secondary to the stroke.  Reports to have some discomfort in the hip with standing.    PHYSICAL EXAMINATION:  On today's exam, she presented with no pain with palpation of the greater trochanter.  Range of motion of the knee is as expected.  She presented with a completely healed surgical incision.    IMAGING:  Plain x-rays were reviewed today which were significant for showing excellent alignment of the left hip hemiarthroplasty without any failure.    ASSESSMENT:  Status post left hip hemiarthroplasty.    PLAN:  Discussed with the patient and her son through an  that she is doing great.  We are going to continue working with physical therapy.  She has no restrictions.  She will be reevaluated in 3 months from now and at that time, 2 views of the left hip will be obtained.    All questions were answered.

## 2023-05-23 LAB
ACID FAST STAIN (ARUP): NORMAL

## 2023-07-19 DIAGNOSIS — M25.552 PAIN OF LEFT HIP: Primary | ICD-10-CM

## 2023-07-19 DIAGNOSIS — Z98.890 STATUS POST SURGERY: ICD-10-CM

## 2023-08-01 ENCOUNTER — ANCILLARY PROCEDURE (OUTPATIENT)
Dept: GENERAL RADIOLOGY | Facility: CLINIC | Age: 88
End: 2023-08-01
Attending: ORTHOPAEDIC SURGERY
Payer: COMMERCIAL

## 2023-08-01 ENCOUNTER — OFFICE VISIT (OUTPATIENT)
Dept: ORTHOPEDICS | Facility: CLINIC | Age: 88
End: 2023-08-01
Payer: COMMERCIAL

## 2023-08-01 DIAGNOSIS — Z98.890 STATUS POST SURGERY: ICD-10-CM

## 2023-08-01 DIAGNOSIS — M25.552 PAIN OF LEFT HIP: Primary | ICD-10-CM

## 2023-08-01 DIAGNOSIS — M25.552 PAIN OF LEFT HIP: ICD-10-CM

## 2023-08-01 PROCEDURE — 99213 OFFICE O/P EST LOW 20 MIN: CPT | Performed by: ORTHOPAEDIC SURGERY

## 2023-08-01 PROCEDURE — 73502 X-RAY EXAM HIP UNI 2-3 VIEWS: CPT | Mod: GC | Performed by: RADIOLOGY

## 2023-08-01 NOTE — NURSING NOTE
Reason For Visit:   Chief Complaint   Patient presents with    Surgical Followup     Followup left hip hemiarthroplasty DOS  3/21/23          There were no vitals taken for this visit.    Pain Assessment  Patient Currently in Pain: Yes (with moving)  Primary Pain Location: Leg (left)        Watson Montes ATC

## 2023-08-01 NOTE — LETTER
8/1/2023         RE: Julio Grier  [address]   [place] [address]        Dear Colleague,    Thank you for referring your patient, Julio Grier, to the [facility] [place]. Please see a copy of my visit note below.    Chief complaint: Status post left hip hemiarthroplasty performed on March 21, 2023    Patient presents the company of her son.  The whole interview was montane through an .  Patient reports to have some pain and discomfort along the left hip and a little bit along the left thigh.  Patient is not an ambulator even prior to the accident and she has suffered a stroke.    On today's exam she presents with a fairly unremarkable exam there is a minimum discomfort with motion of the hip.    Imaging AP and lateral x-rays of the left hip were reviewed today which are significant for showing no pathology with an excellent cement mantle and alignment of the components    Assessment: Status post left hip hemiarthroplasty    Plan: Discussed with patient and her son that at this point I do not feel that we will have able to get more function out of her given her prefall state.    Patient has already been ruled out for any DVTs with an ultrasound therefore I do believe that the discomfort she is relating is more related to spasticity which could be from the stroke.    She was given a prescription for physical therapy for deep tissue massage and scar management and stretching exercises    Patient will follow-up on as needed basis.  All questions were answered.    TT 20 minutes        Jeffrey Paulino MD

## 2023-08-01 NOTE — PROGRESS NOTES
Chief complaint: Status post left hip hemiarthroplasty performed on March 21, 2023    Patient presents the company of her son.  The whole interview was montane through an .  Patient reports to have some pain and discomfort along the left hip and a little bit along the left thigh.  Patient is not an ambulator even prior to the accident and she has suffered a stroke.    On today's exam she presents with a fairly unremarkable exam there is a minimum discomfort with motion of the hip.    Imaging AP and lateral x-rays of the left hip were reviewed today which are significant for showing no pathology with an excellent cement mantle and alignment of the components    Assessment: Status post left hip hemiarthroplasty    Plan: Discussed with patient and her son that at this point I do not feel that we will have able to get more function out of her given her prefall state.    Patient has already been ruled out for any DVTs with an ultrasound therefore I do believe that the discomfort she is relating is more related to spasticity which could be from the stroke.    She was given a prescription for physical therapy for deep tissue massage and scar management and stretching exercises    Patient will follow-up on as needed basis.  All questions were answered.    TT 20 minutes

## (undated) DEVICE — PACK TOTAL HIP W/POUCH RIVERSIDE LATEX FREE

## (undated) DEVICE — SUCTION MANIFOLD NEPTUNE 2 SYS 4 PORT 0702-020-000

## (undated) DEVICE — POSITIONER ABDUCTION PILLOW FOAM MED FP-ABDUCTM

## (undated) DEVICE — BONE CLEANING TIP INTERPULSE  0210-010-000

## (undated) DEVICE — GLOVE BIOGEL PI MICRO SZ 7.5 48575

## (undated) DEVICE — ANTIFOG SOLUTION W/FOAM PAD 31142527

## (undated) DEVICE — BONE CLEANING TIP INTERPULSE FEMORAL CANAL 0210-008-000

## (undated) DEVICE — SOL WATER IRRIG 1000ML BOTTLE 2F7114

## (undated) DEVICE — LUBRICANT INST KIT ENDO-LUBE 220-90

## (undated) DEVICE — GOWN XLG DISP 9545

## (undated) DEVICE — STRAP KNEE/BODY 31143004

## (undated) DEVICE — TUBING SUCTION MEDI-VAC 1/4"X20' N620A

## (undated) DEVICE — SU FIBERWIRE 2 38"  AR-7200

## (undated) DEVICE — BLADE SAW SAGITTAL STRK 25X75X0.89MM SYS 6 6125-089-075

## (undated) DEVICE — GLOVE BIOGEL PI MICRO INDICATOR UNDERGLOVE SZ 8.0 48980

## (undated) DEVICE — ESU PENCIL W/SMOKE EVAC NEPTUNE STRYKER 0703-046-000

## (undated) DEVICE — STRAP STIRRUP W/SLIP 30187-030

## (undated) DEVICE — SU VICRYL 2-0 CT-1 27" UND J259H

## (undated) DEVICE — CEMENT PRESSURIZER FEMORAL CANAL MED 0206-546-000

## (undated) DEVICE — SU VICRYL 0 CTX 36" J370H

## (undated) DEVICE — SOL NACL 0.9% IRRIG 1000ML BOTTLE 2F7124

## (undated) DEVICE — ENDO VALVE BX EVIS MAJ-210

## (undated) DEVICE — BONE CEMENT MIXEVAC HI VAC W/CARTRIDGE 0306-563-000

## (undated) DEVICE — ESU GROUND PAD UNIVERSAL W/O CORD

## (undated) DEVICE — DEVICE RETRIEVER HEWSON 71111579

## (undated) DEVICE — SPECIMEN TRAP MUCOUS 40ML LUKI C30200A

## (undated) DEVICE — CONNECTOR STOPCOCK 3 WAY MALE LL HI-FLO MX9311L

## (undated) DEVICE — SYR 10ML SLIP TIP W/O NDL 303134

## (undated) DEVICE — PEN MARKING SKIN W/LABELS 31145918

## (undated) DEVICE — SU ETHIBOND 0 CT-1 CR 8X18" CX21D

## (undated) DEVICE — TUBING PRESSURE M/F CONNECTOR 12" 50P112

## (undated) DEVICE — LINEN ORTHO PACK 5446

## (undated) DEVICE — LINEN TOWEL PACK X5 5464

## (undated) DEVICE — ENDO VALVE SUCTION BRONCH EVIS MAJ-209

## (undated) DEVICE — SYR 30ML SLIP TIP W/O NDL 302833

## (undated) DEVICE — PREP CHLORAPREP 26ML TINTED HI-LITE ORANGE 930815

## (undated) RX ORDER — SODIUM CHLORIDE, SODIUM LACTATE, POTASSIUM CHLORIDE, CALCIUM CHLORIDE 600; 310; 30; 20 MG/100ML; MG/100ML; MG/100ML; MG/100ML
INJECTION, SOLUTION INTRAVENOUS
Status: DISPENSED
Start: 2023-03-28

## (undated) RX ORDER — ONDANSETRON 2 MG/ML
INJECTION INTRAMUSCULAR; INTRAVENOUS
Status: DISPENSED
Start: 2023-03-21

## (undated) RX ORDER — HYDROMORPHONE HYDROCHLORIDE 1 MG/ML
INJECTION, SOLUTION INTRAMUSCULAR; INTRAVENOUS; SUBCUTANEOUS
Status: DISPENSED
Start: 2023-03-21

## (undated) RX ORDER — FENTANYL CITRATE 50 UG/ML
INJECTION, SOLUTION INTRAMUSCULAR; INTRAVENOUS
Status: DISPENSED
Start: 2023-03-28

## (undated) RX ORDER — PROPOFOL 10 MG/ML
INJECTION, EMULSION INTRAVENOUS
Status: DISPENSED
Start: 2023-03-21

## (undated) RX ORDER — CEFAZOLIN SODIUM/WATER 2 G/20 ML
SYRINGE (ML) INTRAVENOUS
Status: DISPENSED
Start: 2023-03-21

## (undated) RX ORDER — FENTANYL CITRATE 50 UG/ML
INJECTION, SOLUTION INTRAMUSCULAR; INTRAVENOUS
Status: DISPENSED
Start: 2023-03-21

## (undated) RX ORDER — ONDANSETRON 2 MG/ML
INJECTION INTRAMUSCULAR; INTRAVENOUS
Status: DISPENSED
Start: 2023-03-28

## (undated) RX ORDER — DEXAMETHASONE SODIUM PHOSPHATE 4 MG/ML
INJECTION, SOLUTION INTRA-ARTICULAR; INTRALESIONAL; INTRAMUSCULAR; INTRAVENOUS; SOFT TISSUE
Status: DISPENSED
Start: 2023-03-28

## (undated) RX ORDER — GLYCOPYRROLATE 0.2 MG/ML
INJECTION INTRAMUSCULAR; INTRAVENOUS
Status: DISPENSED
Start: 2023-03-28

## (undated) RX ORDER — ACETAMINOPHEN 325 MG/10.15ML
LIQUID ORAL
Status: DISPENSED
Start: 2023-03-28

## (undated) RX ORDER — BUPIVACAINE HYDROCHLORIDE 5 MG/ML
INJECTION, SOLUTION PERINEURAL
Status: DISPENSED
Start: 2023-03-21